# Patient Record
Sex: FEMALE | Race: OTHER | HISPANIC OR LATINO | URBAN - METROPOLITAN AREA
[De-identification: names, ages, dates, MRNs, and addresses within clinical notes are randomized per-mention and may not be internally consistent; named-entity substitution may affect disease eponyms.]

---

## 2017-10-16 ENCOUNTER — EMERGENCY (EMERGENCY)
Facility: HOSPITAL | Age: 82
LOS: 1 days | Discharge: HOME | End: 2017-10-16
Attending: EMERGENCY MEDICINE
Payer: MEDICARE

## 2017-10-16 VITALS
HEART RATE: 68 BPM | OXYGEN SATURATION: 96 % | SYSTOLIC BLOOD PRESSURE: 169 MMHG | RESPIRATION RATE: 20 BRPM | WEIGHT: 106.92 LBS | DIASTOLIC BLOOD PRESSURE: 63 MMHG | TEMPERATURE: 98 F | HEIGHT: 55 IN

## 2017-10-16 VITALS
DIASTOLIC BLOOD PRESSURE: 69 MMHG | RESPIRATION RATE: 20 BRPM | WEIGHT: 89.95 LBS | HEIGHT: 55 IN | SYSTOLIC BLOOD PRESSURE: 170 MMHG | OXYGEN SATURATION: 99 % | HEART RATE: 74 BPM | TEMPERATURE: 98 F

## 2017-10-16 VITALS
DIASTOLIC BLOOD PRESSURE: 74 MMHG | HEART RATE: 78 BPM | RESPIRATION RATE: 16 BRPM | OXYGEN SATURATION: 99 % | SYSTOLIC BLOOD PRESSURE: 152 MMHG | TEMPERATURE: 97 F

## 2017-10-16 PROCEDURE — 70450 CT HEAD/BRAIN W/O DYE: CPT

## 2017-10-16 PROCEDURE — 73110 X-RAY EXAM OF WRIST: CPT | Mod: 26,LT

## 2017-10-16 PROCEDURE — 73110 X-RAY EXAM OF WRIST: CPT

## 2017-10-16 PROCEDURE — 99283 EMERGENCY DEPT VISIT LOW MDM: CPT

## 2017-10-16 PROCEDURE — T1013: CPT

## 2017-10-16 PROCEDURE — 70450 CT HEAD/BRAIN W/O DYE: CPT | Mod: 26

## 2017-10-16 PROCEDURE — 99284 EMERGENCY DEPT VISIT MOD MDM: CPT

## 2017-10-16 PROCEDURE — 99284 EMERGENCY DEPT VISIT MOD MDM: CPT | Mod: 25

## 2017-10-16 PROCEDURE — 99283 EMERGENCY DEPT VISIT LOW MDM: CPT | Mod: 25

## 2017-10-16 NOTE — ED ADULT TRIAGE NOTE - CHIEF COMPLAINT QUOTE
pt kathleen from [facility] after staff saw bruising and swelling to forhead, pt reports hitting her head this am while almost falling, no loc, no dizziness. pt on plavix and asa. md cleared for main room

## 2017-10-16 NOTE — ED PROVIDER NOTE - OBJECTIVE STATEMENT
pt presents s/p hitting her head on cabinet on plavix no loc + left sided unlaiteral achy ha no prior tx. no loc denies fever. denies neck pain. no chest pain or sob. no abd pain. no n/v/d. no urinary f/u/d. no back pain. no motor or sensory deficits. denies illicit drug use. no recent travel. no rash. no other acute issues symptoms or concerns

## 2017-10-16 NOTE — ED STATDOCS - ATTENDING CONTRIBUTION TO CARE
I, Dr. Bell, performed the initial face to face bedside interview with this patient regarding history of present illness, review of symptoms and relevant past medical, social and family history.  I completed an independent physical examination.  I was the initial provider who evaluated this patient. I have signed out the follow up of any pending tests (i.e. labs, radiological studies) to the ACP.  I have communicated the patient’s plan of care and disposition with the ACP.

## 2017-10-16 NOTE — ED STATDOCS - SKIN, MLM
Bruise to the dorsum of the L wrist. Abrasion to the R knee. Hematoma and swelling to the medial aspect of the R knee. Swelling to the ulnar aspect of the L wrist. Ecchymosis and bruising to the L forehead and L upper face.

## 2017-10-16 NOTE — ED ADULT NURSE NOTE - OBJECTIVE STATEMENT
Pt states she was walking out of the house and missed a step and fell forward hurting her head.  Pt states she takes 81mg of ASA a day.  HR is regular, lungs clear b/l abd soft with positive bowel sounds in all four quadrants will cont to monitor.  Denies loc

## 2017-10-16 NOTE — ED PROVIDER NOTE - PHYSICAL EXAMINATION
neuro: CN II - XII intact, EOMI, PERRL, no papilledema, 5/5 muscle strength x 4 extremities, no sensory deficits, 2+ dtr globally, negative babinski, no ataxic gait, normal SINDHU and FNT, normal romberg

## 2017-10-16 NOTE — ED ADULT TRIAGE NOTE - CHIEF COMPLAINT QUOTE
"I fell this morning walking into my house from the patio. They brought me here and did a CT of my head but then sent me home. I have pain to my right knee (Pt has bruising and swelling to anterior lateral knee) and pain to my left shin and I have pain to left wrist, I took a Tylenol but it still hurts a lot. "  Pt is on baby asa

## 2017-10-16 NOTE — ED PROVIDER NOTE - MEDICAL DECISION MAKING DETAILS
ambualting in nad at [facility] per daughterin law at bedside return to ed for intractable HA, persistent vomiting, or new onset motor/sensory deficits

## 2017-10-16 NOTE — ED STATDOCS - PROGRESS NOTE DETAILS
Pt is an 85yo F complaining of L wrist pain after fall this morning. She states that she was in the ED this morning and had a head CT. She states she went home and her wrist started hurting and had decreased strength. She also states that she took Tylenol without relief. heart rrr. lungs clear to auscultation. Cap. refillo <2 seconds. neurovascularly intact. decreased L wrist strength . Will await x-ray and re-evaluate. No fracture or dislocation on x-ray. Will brace wrist and have pt f/u with PCP/ ORTHO.

## 2017-11-03 PROBLEM — Z00.00 ENCOUNTER FOR PREVENTIVE HEALTH EXAMINATION: Status: ACTIVE | Noted: 2017-11-03

## 2019-10-01 ENCOUNTER — EMERGENCY (EMERGENCY)
Facility: HOSPITAL | Age: 84
LOS: 1 days | Discharge: HOME | End: 2019-10-01
Attending: STUDENT IN AN ORGANIZED HEALTH CARE EDUCATION/TRAINING PROGRAM
Payer: MEDICARE

## 2019-10-01 VITALS
TEMPERATURE: 99 F | SYSTOLIC BLOOD PRESSURE: 146 MMHG | DIASTOLIC BLOOD PRESSURE: 56 MMHG | HEART RATE: 67 BPM | OXYGEN SATURATION: 99 % | HEIGHT: 63 IN | RESPIRATION RATE: 16 BRPM | WEIGHT: 106.04 LBS

## 2019-10-01 PROCEDURE — 73564 X-RAY EXAM KNEE 4 OR MORE: CPT

## 2019-10-01 PROCEDURE — 99283 EMERGENCY DEPT VISIT LOW MDM: CPT

## 2019-10-01 PROCEDURE — 73564 X-RAY EXAM KNEE 4 OR MORE: CPT | Mod: 26,LT

## 2019-10-01 RX ORDER — ACETAMINOPHEN 500 MG
975 TABLET ORAL ONCE
Refills: 0 | Status: COMPLETED | OUTPATIENT
Start: 2019-10-01 | End: 2019-10-01

## 2019-10-01 RX ADMIN — Medication 975 MILLIGRAM(S): at 11:54

## 2019-10-01 NOTE — ED STATDOCS - OBJECTIVE STATEMENT
Pt is an 87 y/o F presenting to the ED with c/o left knee pain for the last 3-4 days. Hx obtained by son at bedside by translation. Pt told him she accidentally struck the left knee on the toilet, did not initially have pain, but in the days following the injury, developed left knee pain with swelling. Pt now unable to bear weight or walk secondary to pain. Denies numbness/tingling/weakness. NO other injuries or trauma. Pt has not taken anything for pain.

## 2019-10-01 NOTE — ED ADULT TRIAGE NOTE - CHIEF COMPLAINT QUOTE
Pt hurt her left knee while in the bathroom last Wednesday. +swelling to knee, no bruising or obvious deformity

## 2019-10-01 NOTE — ED STATDOCS - ATTENDING CONTRIBUTION TO CARE
I performed a face to face history and physical exam of the patient and discussed their management with the resident/ACP. I reviewed the resident/ACP's note and agree with the documented findings and plan of care.    imaging reviewed and negative. Pt ambulatory. instructed to f/up with ortho and return for any other concerns.

## 2019-10-01 NOTE — ED STATDOCS - PHYSICAL EXAMINATION
Constitutional - well-developed; well nourished.   Head - NCAT. Airway patent.   Eyes - PERRL.   CV - RRR. no murmur. no edema.   Pulm - CTAB.   Abd - soft, nt. no rebound. no guarding.   Neuro - A&Ox3. strength 5/5 x4. sensation intact x4. normal gait.   Skin - No rash.   MSK - normal ROM. Constitutional - well-developed; well nourished.   Head - NCAT. Airway patent.   Neuro - A&Ox3. strength 5/5 x4. sensation intact x4. normal gait.   Skin - No rash.   MSK - significant tenderness and swelling to the left knee

## 2019-10-01 NOTE — ED STATDOCS - CARE PLAN
Principal Discharge DX:	Acute pain of left knee  Assessment and plan of treatment:	RICE tx as discussed   F/U with PCP  Return to ED if pain intensifies  Secondary Diagnosis:	Contusion of left knee, initial encounter

## 2019-10-01 NOTE — ED STATDOCS - PATIENT PORTAL LINK FT
You can access the FollowMyHealth Patient Portal offered by [facility] by registering at the following website: [email or web address]. By joining [facility]’s FollowMyHealth portal, you will also be able to view your health information using other applications (apps) compatible with our system.

## 2019-10-01 NOTE — ED STATDOCS - PROGRESS NOTE DETAILS
Pt moved form intake Room. Pt seen and evaluated by intake Physician. HPI, Physical examination performed by intake Physician . Note reviewed and followup examination performed by me consistent with initial assessment. Agrees with intake Physician plan and tests. Pt x-ray of knee negative and Pt was made aware of result. Pt D/C home with RICE treatment protocol and  will return if pain worsens.

## 2019-12-01 ENCOUNTER — OUTPATIENT (OUTPATIENT)
Dept: OUTPATIENT SERVICES | Facility: HOSPITAL | Age: 84
LOS: 1 days | End: 2019-12-01
Payer: MEDICARE

## 2019-12-01 PROCEDURE — G9001: CPT

## 2019-12-19 ENCOUNTER — INPATIENT (INPATIENT)
Facility: HOSPITAL | Age: 84
LOS: 7 days | Discharge: HOME | DRG: 638 | End: 2019-12-27
Attending: HOSPITALIST | Admitting: HOSPITALIST
Payer: MEDICARE

## 2019-12-19 VITALS
OXYGEN SATURATION: 98 % | RESPIRATION RATE: 18 BRPM | HEART RATE: 71 BPM | DIASTOLIC BLOOD PRESSURE: 44 MMHG | SYSTOLIC BLOOD PRESSURE: 105 MMHG | TEMPERATURE: 98 F

## 2019-12-19 DIAGNOSIS — Z95.0 PRESENCE OF CARDIAC PACEMAKER: ICD-10-CM

## 2019-12-19 DIAGNOSIS — L08.9 LOCAL INFECTION OF THE SKIN AND SUBCUTANEOUS TISSUE, UNSPECIFIED: ICD-10-CM

## 2019-12-19 DIAGNOSIS — E11.621 TYPE 2 DIABETES MELLITUS WITH FOOT ULCER: ICD-10-CM

## 2019-12-19 LAB
ALBUMIN SERPL ELPH-MCNC: 3.9 G/DL — SIGNIFICANT CHANGE UP (ref 3.3–5.2)
ALP SERPL-CCNC: 81 U/L — SIGNIFICANT CHANGE UP (ref 40–120)
ALT FLD-CCNC: 47 U/L — HIGH
ANION GAP SERPL CALC-SCNC: 12 MMOL/L — SIGNIFICANT CHANGE UP (ref 5–17)
APPEARANCE UR: CLEAR — SIGNIFICANT CHANGE UP
APTT BLD: 32.9 SEC — SIGNIFICANT CHANGE UP (ref 27.5–36.3)
AST SERPL-CCNC: 51 U/L — HIGH
BACTERIA # UR AUTO: ABNORMAL
BASOPHILS # BLD AUTO: 0.02 K/UL — SIGNIFICANT CHANGE UP (ref 0–0.2)
BASOPHILS NFR BLD AUTO: 0.5 % — SIGNIFICANT CHANGE UP (ref 0–2)
BILIRUB SERPL-MCNC: 0.4 MG/DL — SIGNIFICANT CHANGE UP (ref 0.4–2)
BILIRUB UR-MCNC: NEGATIVE — SIGNIFICANT CHANGE UP
BUN SERPL-MCNC: 32 MG/DL — HIGH (ref 8–20)
CALCIUM SERPL-MCNC: 9.8 MG/DL — SIGNIFICANT CHANGE UP (ref 8.6–10.2)
CHLORIDE SERPL-SCNC: 103 MMOL/L — SIGNIFICANT CHANGE UP (ref 98–107)
CO2 SERPL-SCNC: 29 MMOL/L — SIGNIFICANT CHANGE UP (ref 22–29)
COLOR SPEC: YELLOW — SIGNIFICANT CHANGE UP
CREAT SERPL-MCNC: 1.25 MG/DL — SIGNIFICANT CHANGE UP (ref 0.5–1.3)
DIFF PNL FLD: NEGATIVE — SIGNIFICANT CHANGE UP
EOSINOPHIL # BLD AUTO: 0.06 K/UL — SIGNIFICANT CHANGE UP (ref 0–0.5)
EOSINOPHIL NFR BLD AUTO: 1.4 % — SIGNIFICANT CHANGE UP (ref 0–6)
EPI CELLS # UR: SIGNIFICANT CHANGE UP
GLUCOSE BLDC GLUCOMTR-MCNC: 164 MG/DL — HIGH (ref 70–99)
GLUCOSE SERPL-MCNC: 122 MG/DL — HIGH (ref 70–115)
GLUCOSE UR QL: NEGATIVE MG/DL — SIGNIFICANT CHANGE UP
HCT VFR BLD CALC: 36.4 % — SIGNIFICANT CHANGE UP (ref 34.5–45)
HGB BLD-MCNC: 11.1 G/DL — LOW (ref 11.5–15.5)
IMM GRANULOCYTES NFR BLD AUTO: 0.2 % — SIGNIFICANT CHANGE UP (ref 0–1.5)
INR BLD: 1.06 RATIO — SIGNIFICANT CHANGE UP (ref 0.88–1.16)
KETONES UR-MCNC: NEGATIVE — SIGNIFICANT CHANGE UP
LACTATE BLDV-MCNC: 0.7 MMOL/L — SIGNIFICANT CHANGE UP (ref 0.5–2)
LEUKOCYTE ESTERASE UR-ACNC: ABNORMAL
LYMPHOCYTES # BLD AUTO: 0.5 K/UL — LOW (ref 1–3.3)
LYMPHOCYTES # BLD AUTO: 11.8 % — LOW (ref 13–44)
MCHC RBC-ENTMCNC: 30.5 GM/DL — LOW (ref 32–36)
MCHC RBC-ENTMCNC: 31.3 PG — SIGNIFICANT CHANGE UP (ref 27–34)
MCV RBC AUTO: 102.5 FL — HIGH (ref 80–100)
MONOCYTES # BLD AUTO: 0.39 K/UL — SIGNIFICANT CHANGE UP (ref 0–0.9)
MONOCYTES NFR BLD AUTO: 9.2 % — SIGNIFICANT CHANGE UP (ref 2–14)
NEUTROPHILS # BLD AUTO: 3.24 K/UL — SIGNIFICANT CHANGE UP (ref 1.8–7.4)
NEUTROPHILS NFR BLD AUTO: 76.9 % — SIGNIFICANT CHANGE UP (ref 43–77)
NITRITE UR-MCNC: NEGATIVE — SIGNIFICANT CHANGE UP
PH UR: 6 — SIGNIFICANT CHANGE UP (ref 5–8)
PLATELET # BLD AUTO: 147 K/UL — LOW (ref 150–400)
POTASSIUM SERPL-MCNC: 4.5 MMOL/L — SIGNIFICANT CHANGE UP (ref 3.5–5.3)
POTASSIUM SERPL-SCNC: 4.5 MMOL/L — SIGNIFICANT CHANGE UP (ref 3.5–5.3)
PROT SERPL-MCNC: 6.3 G/DL — LOW (ref 6.6–8.7)
PROT UR-MCNC: 15 MG/DL
PROTHROM AB SERPL-ACNC: 12.2 SEC — SIGNIFICANT CHANGE UP (ref 10–12.9)
RBC # BLD: 3.55 M/UL — LOW (ref 3.8–5.2)
RBC # FLD: 15.2 % — HIGH (ref 10.3–14.5)
RBC CASTS # UR COMP ASSIST: ABNORMAL /HPF (ref 0–4)
SODIUM SERPL-SCNC: 144 MMOL/L — SIGNIFICANT CHANGE UP (ref 135–145)
SP GR SPEC: 1.01 — SIGNIFICANT CHANGE UP (ref 1.01–1.02)
UROBILINOGEN FLD QL: NEGATIVE MG/DL — SIGNIFICANT CHANGE UP
WBC # BLD: 4.22 K/UL — SIGNIFICANT CHANGE UP (ref 3.8–10.5)
WBC # FLD AUTO: 4.22 K/UL — SIGNIFICANT CHANGE UP (ref 3.8–10.5)
WBC UR QL: SIGNIFICANT CHANGE UP

## 2019-12-19 PROCEDURE — 93923 UPR/LXTR ART STDY 3+ LVLS: CPT | Mod: 26

## 2019-12-19 PROCEDURE — 71045 X-RAY EXAM CHEST 1 VIEW: CPT | Mod: 26

## 2019-12-19 PROCEDURE — 93970 EXTREMITY STUDY: CPT | Mod: 26

## 2019-12-19 PROCEDURE — 99285 EMERGENCY DEPT VISIT HI MDM: CPT

## 2019-12-19 PROCEDURE — 99223 1ST HOSP IP/OBS HIGH 75: CPT | Mod: AI

## 2019-12-19 PROCEDURE — 73630 X-RAY EXAM OF FOOT: CPT | Mod: 26,LT

## 2019-12-19 RX ORDER — DEXTROSE 50 % IN WATER 50 %
25 SYRINGE (ML) INTRAVENOUS ONCE
Refills: 0 | Status: DISCONTINUED | OUTPATIENT
Start: 2019-12-19 | End: 2019-12-27

## 2019-12-19 RX ORDER — PIPERACILLIN AND TAZOBACTAM 4; .5 G/20ML; G/20ML
3.38 INJECTION, POWDER, LYOPHILIZED, FOR SOLUTION INTRAVENOUS ONCE
Refills: 0 | Status: COMPLETED | OUTPATIENT
Start: 2019-12-19 | End: 2019-12-19

## 2019-12-19 RX ORDER — SODIUM CHLORIDE 9 MG/ML
1000 INJECTION INTRAMUSCULAR; INTRAVENOUS; SUBCUTANEOUS ONCE
Refills: 0 | Status: COMPLETED | OUTPATIENT
Start: 2019-12-19 | End: 2019-12-19

## 2019-12-19 RX ORDER — INSULIN LISPRO 100/ML
VIAL (ML) SUBCUTANEOUS
Refills: 0 | Status: DISCONTINUED | OUTPATIENT
Start: 2019-12-19 | End: 2019-12-27

## 2019-12-19 RX ORDER — PIPERACILLIN AND TAZOBACTAM 4; .5 G/20ML; G/20ML
3.38 INJECTION, POWDER, LYOPHILIZED, FOR SOLUTION INTRAVENOUS EVERY 8 HOURS
Refills: 0 | Status: DISCONTINUED | OUTPATIENT
Start: 2019-12-19 | End: 2019-12-27

## 2019-12-19 RX ORDER — GLUCAGON INJECTION, SOLUTION 0.5 MG/.1ML
1 INJECTION, SOLUTION SUBCUTANEOUS ONCE
Refills: 0 | Status: DISCONTINUED | OUTPATIENT
Start: 2019-12-19 | End: 2019-12-27

## 2019-12-19 RX ORDER — ENOXAPARIN SODIUM 100 MG/ML
40 INJECTION SUBCUTANEOUS DAILY
Refills: 0 | Status: DISCONTINUED | OUTPATIENT
Start: 2019-12-19 | End: 2019-12-27

## 2019-12-19 RX ORDER — DEXTROSE 50 % IN WATER 50 %
15 SYRINGE (ML) INTRAVENOUS ONCE
Refills: 0 | Status: DISCONTINUED | OUTPATIENT
Start: 2019-12-19 | End: 2019-12-27

## 2019-12-19 RX ORDER — INSULIN GLARGINE 100 [IU]/ML
5 INJECTION, SOLUTION SUBCUTANEOUS AT BEDTIME
Refills: 0 | Status: DISCONTINUED | OUTPATIENT
Start: 2019-12-19 | End: 2019-12-27

## 2019-12-19 RX ORDER — VANCOMYCIN HCL 1 G
1000 VIAL (EA) INTRAVENOUS ONCE
Refills: 0 | Status: COMPLETED | OUTPATIENT
Start: 2019-12-19 | End: 2019-12-19

## 2019-12-19 RX ORDER — DEXTROSE 50 % IN WATER 50 %
12.5 SYRINGE (ML) INTRAVENOUS ONCE
Refills: 0 | Status: DISCONTINUED | OUTPATIENT
Start: 2019-12-19 | End: 2019-12-27

## 2019-12-19 RX ORDER — SODIUM CHLORIDE 9 MG/ML
1000 INJECTION, SOLUTION INTRAVENOUS
Refills: 0 | Status: DISCONTINUED | OUTPATIENT
Start: 2019-12-19 | End: 2019-12-27

## 2019-12-19 RX ADMIN — SODIUM CHLORIDE 1000 MILLILITER(S): 9 INJECTION INTRAMUSCULAR; INTRAVENOUS; SUBCUTANEOUS at 15:06

## 2019-12-19 RX ADMIN — Medication 250 MILLIGRAM(S): at 22:38

## 2019-12-19 RX ADMIN — INSULIN GLARGINE 5 UNIT(S): 100 INJECTION, SOLUTION SUBCUTANEOUS at 21:53

## 2019-12-19 RX ADMIN — PIPERACILLIN AND TAZOBACTAM 200 GRAM(S): 4; .5 INJECTION, POWDER, LYOPHILIZED, FOR SOLUTION INTRAVENOUS at 15:06

## 2019-12-19 NOTE — ED ADULT NURSE NOTE - PMH
No pertinent past medical history    Pacemaker    Type 2 diabetes mellitus with foot ulcer, unspecified whether long term insulin use

## 2019-12-19 NOTE — H&P ADULT - NSHPPHYSICALEXAM_GEN_ALL_CORE
Normocephalic   EOMI PERRL  Neck supple no JVD  S1 and S2 regular   CTA B   Abd soft + BS not tender   Amor pedal edema . No calf tenderness .  Left toe tip/nail infection, erythema, edema, tenderness   No skin rash   AAO X3 no focal neurologic deficit  Mood, affect appropriate

## 2019-12-19 NOTE — CONSULT NOTE ADULT - ASSESSMENT
A:  Left 2nd digit cellulitis    P:  Patient evaluated and chart reviewed  Recommend abx per primary team recommendations   Foot cleansed with normal saline   X-ray reviewed - results as noted above  Recommend MRI to r/o any bone infections  CHARLENE ordered - pending results   Left 2nd digit dressed with betadine DSD   Pt to remain WBing as tolerated to left foot  Podiatry will follow while patient is admitted  Discussed with attending Dr. Mae

## 2019-12-19 NOTE — ED STATDOCS - OBJECTIVE STATEMENT
87 y/o F pt with PMHx of DM presents to the ED c/o left toe pain. pain at left 2nd toe, getting worse. patient had toe nails cut and obtained an infection. no trauma. Denies fever, chills, SOB, CP, abd pain.

## 2019-12-19 NOTE — H&P ADULT - NSICDXPASTMEDICALHX_GEN_ALL_CORE_FT
PAST MEDICAL HISTORY:  No pertinent past medical history     Pacemaker     Type 2 diabetes mellitus with foot ulcer, unspecified whether long term insulin use

## 2019-12-19 NOTE — ED STATDOCS - ATTENDING CONTRIBUTION TO CARE
I, Felix Cook, performed the initial face to face bedside interview with this patient regarding history of present illness, review of symptoms and relevant past medical, social and family history.  I completed an independent physical examination.  I was the initial provider who evaluated this patient. I have signed out the follow up of any pending tests (i.e. labs, radiological studies) to the ACP.  I have communicated the patient’s plan of care and disposition with the ACP.  The history, relevant review of systems, past medical and surgical history, medical decision making, and physical examination was documented by the scribe in my presence and I attest to the accuracy of the documentation.

## 2019-12-19 NOTE — ED PROVIDER NOTE - OBJECTIVE STATEMENT
pt reports 2 months of non healing wound ; 2nd to DM & pt reports being on oral abx 1 month ago per her podiatrist;   wound on left 2nd digit distal tip missing, discoloration, area dressed per md  pt ambulatory with walker

## 2019-12-19 NOTE — CONSULT NOTE ADULT - SUBJECTIVE AND OBJECTIVE BOX
Patient is a 86y old  Female who presents with a chief complaint of left 2nd digit pain    HPI: 87 y/o F pt with PMHx of DM presents to the ED c/o left toe pain. pain at left 2nd toe, getting worse. patient had toe nails cut and obtained an infection. no trauma. Denies fever, chills, SOB, CP, abd pain    History as noted above. Pt presents with pain in her left second digit that she believes to be infected following having her nails cut. Pt denies any current F/V/N/C/SOB. Pt denies any current burning, numbness, or tingling.     PMH:No pertinent past medical history    Allergies: No Known Allergies    Medications: vancomycin  IVPB 1000 milliGRAM(s) IV Intermittent once    FH:No pertinent family history in first degree relatives    PSX: No significant past surgical history    SH: vancomycin  IVPB 1000 milliGRAM(s) IV Intermittent once      Vital Signs Last 24 Hrs  T(C): 36.4 (19 Dec 2019 11:47), Max: 36.4 (19 Dec 2019 11:47)  T(F): 97.5 (19 Dec 2019 11:47), Max: 97.5 (19 Dec 2019 11:47)  HR: 71 (19 Dec 2019 11:47) (71 - 71)  BP: 105/44 (19 Dec 2019 11:47) (105/44 - 105/44)  BP(mean): --  RR: 18 (19 Dec 2019 11:47) (18 - 18)  SpO2: 98% (19 Dec 2019 11:47) (98% - 98%)    LABS                        11.1   4.22  )-----------( 147      ( 19 Dec 2019 15:24 )             36.4               12-19    144  |  103  |  32.0<H>  ----------------------------<  122<H>  4.5   |  29.0  |  1.25    Ca    9.8      19 Dec 2019 15:24    TPro  6.3<L>  /  Alb  3.9  /  TBili  0.4  /  DBili  x   /  AST  51<H>  /  ALT  47<H>  /  AlkPhos  81  12-19      ROS  REVIEW OF SYSTEMS:    CONSTITUTIONAL: No fever, weight loss, or fatigue  EYES: No eye pain, visual disturbances, or discharge  ENMT:  No difficulty hearing, tinnitus, vertigo; No sinus or throat pain  NECK: No pain or stiffness  BREASTS: No pain, masses, or nipple discharge  RESPIRATORY: No cough, wheezing, chills or hemoptysis; No shortness of breath  CARDIOVASCULAR: No chest pain, palpitations, dizziness, or leg swelling  GASTROINTESTINAL: No abdominal or epigastric pain. No nausea, vomiting, or hematemesis; No diarrhea or constipation. No melena or hematochezia.  GENITOURINARY: No dysuria, frequency, hematuria, or incontinence  NEUROLOGICAL: No headaches, memory loss, loss of strength, numbness, or tremors  SKIN: No itching, burning, rashes, or lesions   LYMPH NODES: No enlarged glands  ENDOCRINE: No heat or cold intolerance; No hair loss  MUSCULOSKELETAL: No joint pain or swelling; No muscle, back, or extremity pain  PSYCHIATRIC: No depression, anxiety, mood swings, or difficulty sleeping  HEME/LYMPH: No easy bruising, or bleeding gums  ALLERY AND IMMUNOLOGIC: No hives or eczema      PHYSICAL EXAM  GEN: BLANCA MONTERROSO is a pleasant well-nourished, well developed 86y Female in no acute distress, alert awake, and oriented to person, place and time.   LE Focused:    Vasc: DP/PT palpable; CFT <3 sec x 10; TG WNL; minimal edema and erythema noted to left 2nd digit  Derm: No IDM, erythema with superficial abrasion noted to distal tip of left second digit; edema noted  Neuro: Protective sensation grossly intact  MSK: Hammered digits noted 2-5 on left     Imaging: < from: Xray Foot AP + Lateral + Oblique, Left (12.19.19 @ 14:20) >     EXAM:  FOOT-LEFT                          PROCEDURE DATE:  12/19/2019          INTERPRETATION:  Radiographs of the left foot         CLINICAL INFORMATION:   Foot pain. Attention second toe    TECHNIQUE:  Frontal, oblique and lateral views of the foot were obtained.    FINDINGS:   No prior similar studies are available for review.    There is diffuse osteopenia.    The forefoot demonstrates no evidence of fracture. Normal alignment is seen. There is severe degenerative change at the second metatarsophalangeal joint. Hallux valgus is seen. There is deformity of the third metatarsal tarsal head which may indicate old fracture. There is suggestion of erosion at the top of the second toe. Sesamoids are intact.    The midfoot appears intact.    The hindfoot demonstrates a small plantar calcaneal spur.    There is no soft tissue swelling.    IMPRESSION:   Erosion at the top of the second toe may indicate osteomyelitis. Further evaluation with MR imaging is recommended  Hallux valgus and degenerative change first MTP joint.                     TO BECKFORD M.D.,ATTENDING RADIOLOGIST  This document has been electronically signed. Dec 19 2019  2:36PM

## 2019-12-19 NOTE — ED STATDOCS - PROGRESS NOTE DETAILS
KELSIE Smith NOTE: Pt evaluated at bedside. Pt evaluated prior by intake physician. Otherwise HPI/PE/ROS as noted above. Will follow up plan per intake physician. ED  Deepa. KELSIE Smith NOTE: Reviewed labs, will admit to medicine for further management of toe infection. Pt agreeable to admission. Medicine team accepted admission, all further care and disposition transferred to medicine team.

## 2019-12-19 NOTE — H&P ADULT - PROBLEM SELECTOR PLAN 1
started abx vancomycin   Blood cx   CRP, ESR  CHARLENE per podiatry   Will consult ID  Podiatry dr Mae group to follow

## 2019-12-19 NOTE — H&P ADULT - HISTORY OF PRESENT ILLNESS
86 yr female with history of DM, said she had toe nailed clipped about 10 days ago.  Had incidental clip injury to tip of left 2nd toe.  Then worsening pain, swelling and difficulty ambulating or bearing weight on left foot. Send over by podiatry for abx theray.  Left foot xray showing erosion at the tip of 2nd toe that may indicate osteomyelitis.

## 2019-12-19 NOTE — ED ADULT NURSE NOTE - NSIMPLEMENTINTERV_GEN_ALL_ED
Implemented All Fall Risk Interventions:  [place] to call system. Call bell, personal items and telephone within reach. Instruct patient to call for assistance. Room bathroom lighting operational. Non-slip footwear when patient is off stretcher. Physically safe environment: no spills, clutter or unnecessary equipment. Stretcher in lowest position, wheels locked, appropriate side rails in place. Provide visual cue, wrist band, yellow gown, etc. Monitor gait and stability. Monitor for mental status changes and reorient to person, place, and time. Review medications for side effects contributing to fall risk. Reinforce activity limits and safety measures with patient and family.

## 2019-12-20 LAB
ANION GAP SERPL CALC-SCNC: 10 MMOL/L — SIGNIFICANT CHANGE UP (ref 5–17)
BUN SERPL-MCNC: 28 MG/DL — HIGH (ref 8–20)
CALCIUM SERPL-MCNC: 9.3 MG/DL — SIGNIFICANT CHANGE UP (ref 8.6–10.2)
CHLORIDE SERPL-SCNC: 108 MMOL/L — HIGH (ref 98–107)
CO2 SERPL-SCNC: 28 MMOL/L — SIGNIFICANT CHANGE UP (ref 22–29)
CREAT SERPL-MCNC: 1.17 MG/DL — SIGNIFICANT CHANGE UP (ref 0.5–1.3)
CRP SERPL-MCNC: <0.4 MG/DL — SIGNIFICANT CHANGE UP (ref 0–0.4)
CULTURE RESULTS: NO GROWTH — SIGNIFICANT CHANGE UP
ERYTHROCYTE [SEDIMENTATION RATE] IN BLOOD: 7 MM/HR — SIGNIFICANT CHANGE UP (ref 0–20)
GLUCOSE BLDC GLUCOMTR-MCNC: 102 MG/DL — HIGH (ref 70–99)
GLUCOSE BLDC GLUCOMTR-MCNC: 114 MG/DL — HIGH (ref 70–99)
GLUCOSE BLDC GLUCOMTR-MCNC: 116 MG/DL — HIGH (ref 70–99)
GLUCOSE BLDC GLUCOMTR-MCNC: 95 MG/DL — SIGNIFICANT CHANGE UP (ref 70–99)
GLUCOSE SERPL-MCNC: 96 MG/DL — SIGNIFICANT CHANGE UP (ref 70–115)
HBA1C BLD-MCNC: 6 % — HIGH (ref 4–5.6)
HCT VFR BLD CALC: 35.8 % — SIGNIFICANT CHANGE UP (ref 34.5–45)
HGB BLD-MCNC: 11 G/DL — LOW (ref 11.5–15.5)
MCHC RBC-ENTMCNC: 30.7 GM/DL — LOW (ref 32–36)
MCHC RBC-ENTMCNC: 30.9 PG — SIGNIFICANT CHANGE UP (ref 27–34)
MCV RBC AUTO: 100.6 FL — HIGH (ref 80–100)
PLATELET # BLD AUTO: 134 K/UL — LOW (ref 150–400)
POTASSIUM SERPL-MCNC: 4.2 MMOL/L — SIGNIFICANT CHANGE UP (ref 3.5–5.3)
POTASSIUM SERPL-SCNC: 4.2 MMOL/L — SIGNIFICANT CHANGE UP (ref 3.5–5.3)
RBC # BLD: 3.56 M/UL — LOW (ref 3.8–5.2)
RBC # FLD: 15.2 % — HIGH (ref 10.3–14.5)
SODIUM SERPL-SCNC: 146 MMOL/L — HIGH (ref 135–145)
SPECIMEN SOURCE: SIGNIFICANT CHANGE UP
WBC # BLD: 5.27 K/UL — SIGNIFICANT CHANGE UP (ref 3.8–10.5)
WBC # FLD AUTO: 5.27 K/UL — SIGNIFICANT CHANGE UP (ref 3.8–10.5)

## 2019-12-20 PROCEDURE — 99223 1ST HOSP IP/OBS HIGH 75: CPT

## 2019-12-20 PROCEDURE — 99233 SBSQ HOSP IP/OBS HIGH 50: CPT

## 2019-12-20 RX ORDER — INFLUENZA VIRUS VACCINE 15; 15; 15; 15 UG/.5ML; UG/.5ML; UG/.5ML; UG/.5ML
0.5 SUSPENSION INTRAMUSCULAR ONCE
Refills: 0 | Status: DISCONTINUED | OUTPATIENT
Start: 2019-12-20 | End: 2019-12-27

## 2019-12-20 RX ADMIN — PIPERACILLIN AND TAZOBACTAM 25 GRAM(S): 4; .5 INJECTION, POWDER, LYOPHILIZED, FOR SOLUTION INTRAVENOUS at 11:34

## 2019-12-20 RX ADMIN — INSULIN GLARGINE 5 UNIT(S): 100 INJECTION, SOLUTION SUBCUTANEOUS at 21:14

## 2019-12-20 RX ADMIN — PIPERACILLIN AND TAZOBACTAM 25 GRAM(S): 4; .5 INJECTION, POWDER, LYOPHILIZED, FOR SOLUTION INTRAVENOUS at 21:14

## 2019-12-20 RX ADMIN — ENOXAPARIN SODIUM 40 MILLIGRAM(S): 100 INJECTION SUBCUTANEOUS at 11:33

## 2019-12-20 NOTE — CONSULT NOTE ADULT - SUBJECTIVE AND OBJECTIVE BOX
[facility]  INFECTIOUS DISEASES AND INTERNAL MEDICINE at [place]  =======================================================  Leandro Monk MD  Diplomates American Board of Internal Medicine and Infectious Diseases  Tel: [phone]      Fax: [phone]  =======================================================      [id number]  BLANCA MONTERROSO     CC: Patient is a 86y old  Female who presents with a chief complaint of Left Second toe infection (20 Dec 2019 15:31)    87y/o  Female with h/o DM. Patient comes in with left 2nd toe discoloration. Reports she had toe nailed clipped about 1 month ago. There was incidental clip injury to tip of left 2nd toe.  Since the toe has become discolored, tender, swollen and she hs had difficulty ambulating or bearing weight on left foot. In the ER patient was afebrile, no leukocytosis. Started on Zosyn. ID input requested.     Past Medical & Surgical Hx:  Pacemaker  Type 2 diabetes mellitus with foot ulcer, unspecified whether long term insulin use  No significant past surgical history      Social Hx:  Denies smoking, ETOH      FAMILY HISTORY:  DM - mother and father       Allergies  No Known Allergies      Antibiotics:  piperacillin/tazobactam IVPB.. 3.375 Gram(s) IV Intermittent every 8 hours      REVIEW OF SYSTEMS:  CONSTITUTIONAL:  No Fever or chills  HEENT:  No diplopia or blurred vision.  No earache, sore throat or runny nose.  CARDIOVASCULAR:  No pressure, squeezing, strangling, tightness, heaviness or aching about the chest, neck, axilla or epigastrium.  RESPIRATORY:  No cough, shortness of breath  GASTROINTESTINAL:  No nausea, vomiting or diarrhea.  GENITOURINARY:  No dysuria, frequency or urgency.   MUSCULOSKELETAL:  no joint aches, no muscle pain  SKIN:  Left foot 2nd toe with dry gangrene at the tip  NEUROLOGIC:  No Headaches, seizures   PSYCHIATRIC:  No disorder of thought or mood.  ENDOCRINE:  No heat or cold intolerance  HEMATOLOGICAL:  No easy bruising or bleeding.       Physical Exam:  Vital Signs Last 24 Hrs  T(C): 36.7 (20 Dec 2019 15:44), Max: 36.7 (20 Dec 2019 07:50)  T(F): 98 (20 Dec 2019 15:44), Max: 98 (20 Dec 2019 07:50)  HR: 65 (20 Dec 2019 15:44) (65 - 92)  BP: 132/82 (20 Dec 2019 15:44) (124/62 - 140/71)  RR: 18 (20 Dec 2019 15:44) (17 - 18)  SpO2: 96% (20 Dec 2019 15:44) (96% - 98%)      GEN: NAD, pleasant  HEENT: normocephalic and atraumatic. EOMI. PERRL.  Anicteric  NECK: Supple.   LUNGS: Clear to auscultation.  HEART: Regular rate and rhythm   ABDOMEN: Soft, nontender, and nondistended.  Positive bowel sounds.    : No CVA tenderness  EXTREMITIES: Without any edema.  MSK: No joint swelling  NEUROLOGIC: No Focal Deficits  PSYCHIATRIC: Appropriate affect .  SKIN: Left second toe with dry gangrene on the tip      Labs:      146<H>  |  108<H>  |  28.0<H>  ----------------------------<  96  4.2   |  28.0  |  1.17    Ca    9.3      20 Dec 2019 08:10    TPro  6.3<L>  /  Alb  3.9  /  TBili  0.4  /  DBili  x   /  AST  51<H>  /  ALT  47<H>  /  AlkPhos  81                            11.0   5.27  )-----------( 134      ( 20 Dec 2019 08:10 )             35.8       PT/INR - ( 19 Dec 2019 15:24 )   PT: 12.2 sec;   INR: 1.06 ratio         PTT - ( 19 Dec 2019 15:24 )  PTT:32.9 sec  Urinalysis Basic - ( 19 Dec 2019 18:45 )    Color: Yellow / Appearance: Clear / S.010 / pH: x  Gluc: x / Ketone: Negative  / Bili: Negative / Urobili: Negative mg/dL   Blood: x / Protein: 15 mg/dL / Nitrite: Negative   Leuk Esterase: Small / RBC: 6-10 /HPF / WBC 0-2   Sq Epi: x / Non Sq Epi: Occasional / Bacteria: Occasional      LIVER FUNCTIONS - ( 19 Dec 2019 15:24 )  Alb: 3.9 g/dL / Pro: 6.3 g/dL / ALK PHOS: 81 U/L / ALT: 47 U/L / AST: 51 U/L / GGT: x             Sedimentation Rate, Erythrocyte: 7 mm/hr (19 @ 08:10)    C-Reactive Protein, Serum: <0.40 mg/dL (19 @ 08:10)      Hemoglobin A1C, Whole Blood (19 @ 08:10)    Hemoglobin A1C, Whole Blood: 6.0 %      RECENT CULTURES:   @ 18:46 .Urine     No growth        < from: US Duplex Venous Lower Ext Complete, Bilateral (19 @ 20:38) >  EXAM:  US DPLX LWR EXT VEINS COMPL BI                          PROCEDURE DATE:  2019      INTERPRETATION:  CLINICAL INFORMATION: Bilateral leg swelling.    COMPARISON: None available.    TECHNIQUE: Duplex sonography of the BILATERAL LOWER extremity veins with color and spectral Doppler, with and without compression.      FINDINGS:    There is normal compressibility of the bilateral common femoral, femoral and popliteal veins.     Doppler examination shows normal spontaneous and phasic flow.    No calf vein thrombosis is detected.    Right popliteal fossa cyst measuring 5.8 x 2.2 x 3.9 cm. Subcutaneous edema in the right calf.    IMPRESSION:     No evidence of deep venous thrombosis in either lower extremity.    < end of copied text >        < from: Xray Foot AP + Lateral + Oblique, Left (19 @ 14:20) >  EXAM:  FOOT-LEFT                          PROCEDURE DATE:  2019      INTERPRETATION:  Radiographs of the left foot         CLINICAL INFORMATION:   Foot pain. Attention second toe    TECHNIQUE:  Frontal, oblique and lateral views of the foot were obtained.    FINDINGS:   No prior similar studies are available for review.    There is diffuse osteopenia.    The forefoot demonstrates no evidence of fracture. Normal alignment is seen. There is severe degenerative change at the second metatarsophalangeal joint. Hallux valgus is seen. There is deformity of the third metatarsal tarsal head which may indicate old fracture. There is suggestion of erosion at the top of the second toe. Sesamoids are intact.    The midfoot appears intact.    The hindfoot demonstrates a small plantar calcaneal spur.    There is no soft tissue swelling.    IMPRESSION:   Erosion at the top of the second toe may indicate osteomyelitis. Further evaluation with MR imaging is recommended  Hallux valgus and degenerative change first MTP joint.     < end of copied text >

## 2019-12-20 NOTE — PROGRESS NOTE ADULT - ASSESSMENT
86 yr female with left 2nd toe infection.  DM , Pacemaker      Problem/Plan - 1:  ·  Problem: Toe infection.  Plan: started abx vancomycin   Blood cx   CRP 0.4 , ESR 7.  Less likely a chronic bone infection process of the form of osteomyelitis  Will consult ID Dr Calvillo   Podiatry dr Mae group following . Toe is cellulitic , cyanosed and may be devitalized.      Problem/Plan - 2:  ·  Problem: Type 2 diabetes mellitus with foot ulcer, unspecified whether long term insulin use.  Plan: Insulin sliding scale.      Problem/Plan - 3:  ·  Problem: Pacemaker.  Plan: Stable.   Will obtain Echo.

## 2019-12-20 NOTE — PROGRESS NOTE ADULT - SUBJECTIVE AND OBJECTIVE BOX
Patient is a 86y old  Female who presents with a chief complaint of left 2nd digit pain    HPI: 87 y/o F pt with PMHx of DM presents to the ED c/o left toe pain. pain at left 2nd toe, getting worse. patient had toe nails cut and obtained an infection. no trauma. Denies fever, chills, SOB, CP, abd pain    History as noted above. Pt presents with pain in her left second digit that she believes to be infected following having her nails cut. Pt denies any current F/V/N/C/SOB. Pt denies any current burning, numbness, or tingling.     PMH:No pertinent past medical history    Allergies: No Known Allergies    Medications: vancomycin  IVPB 1000 milliGRAM(s) IV Intermittent once    FH:No pertinent family history in first degree relatives    PSX: No significant past surgical history    SH: vancomycin  IVPB 1000 milliGRAM(s) IV Intermittent once      Vital Signs Last 24 Hrs  T(C): 36.4 (19 Dec 2019 11:47), Max: 36.4 (19 Dec 2019 11:47)  T(F): 97.5 (19 Dec 2019 11:47), Max: 97.5 (19 Dec 2019 11:47)  HR: 71 (19 Dec 2019 11:47) (71 - 71)  BP: 105/44 (19 Dec 2019 11:47) (105/44 - 105/44)  BP(mean): --  RR: 18 (19 Dec 2019 11:47) (18 - 18)  SpO2: 98% (19 Dec 2019 11:47) (98% - 98%)    LABS:                          11.0   5.27  )-----------( 134      ( 20 Dec 2019 08:10 )             35.8     12-20    146<H>  |  108<H>  |  28.0<H>  ----------------------------<  96  4.2   |  28.0  |  1.17    Ca    9.3      20 Dec 2019 08:10    TPro  6.3<L>  /  Alb  3.9  /  TBili  0.4  /  DBili  x   /  AST  51<H>  /  ALT  47<H>  /  AlkPhos  81  12-19          ROS  REVIEW OF SYSTEMS:    CONSTITUTIONAL: No fever, weight loss, or fatigue  EYES: No eye pain, visual disturbances, or discharge  ENMT:  No difficulty hearing, tinnitus, vertigo; No sinus or throat pain  NECK: No pain or stiffness  BREASTS: No pain, masses, or nipple discharge  RESPIRATORY: No cough, wheezing, chills or hemoptysis; No shortness of breath  CARDIOVASCULAR: No chest pain, palpitations, dizziness, or leg swelling  GASTROINTESTINAL: No abdominal or epigastric pain. No nausea, vomiting, or hematemesis; No diarrhea or constipation. No melena or hematochezia.  GENITOURINARY: No dysuria, frequency, hematuria, or incontinence  NEUROLOGICAL: No headaches, memory loss, loss of strength, numbness, or tremors  SKIN: No itching, burning, rashes, or lesions   LYMPH NODES: No enlarged glands  ENDOCRINE: No heat or cold intolerance; No hair loss  MUSCULOSKELETAL: No joint pain or swelling; No muscle, back, or extremity pain  PSYCHIATRIC: No depression, anxiety, mood swings, or difficulty sleeping  HEME/LYMPH: No easy bruising, or bleeding gums  ALLERY AND IMMUNOLOGIC: No hives or eczema      PHYSICAL EXAM  GEN: BLANCA MONTERROSO is a pleasant well-nourished, well developed 86y Female in no acute distress, alert awake, and oriented to person, place and time.   LE Focused:    Vasc: DP/PT palpable; CFT <3 sec x 10; TG WNL; minimal edema and erythema noted to left 2nd digit  Derm: No IDM, erythema with superficial abrasion noted to distal tip of left second digit; edema noted  Neuro: Protective sensation grossly intact  MSK: Hammered digits noted 2-5 on left     Imaging: < from: Xray Foot AP + Lateral + Oblique, Left (12.19.19 @ 14:20) >     EXAM:  FOOT-LEFT                          PROCEDURE DATE:  12/19/2019          INTERPRETATION:  Radiographs of the left foot         CLINICAL INFORMATION:   Foot pain. Attention second toe    TECHNIQUE:  Frontal, oblique and lateral views of the foot were obtained.    FINDINGS:   No prior similar studies are available for review.    There is diffuse osteopenia.    The forefoot demonstrates no evidence of fracture. Normal alignment is seen. There is severe degenerative change at the second metatarsophalangeal joint. Hallux valgus is seen. There is deformity of the third metatarsal tarsal head which may indicate old fracture. There is suggestion of erosion at the top of the second toe. Sesamoids are intact.    The midfoot appears intact.    The hindfoot demonstrates a small plantar calcaneal spur.    There is no soft tissue swelling.    IMPRESSION:   Erosion at the top of the second toe may indicate osteomyelitis. Further evaluation with MR imaging is recommended  Hallux valgus and degenerative change first MTP joint.                     TO BECKFORD M.D.,ATTENDING RADIOLOGIST  This document has been electronically signed. Dec 19 2019  2:36PM

## 2019-12-20 NOTE — PROGRESS NOTE ADULT - SUBJECTIVE AND OBJECTIVE BOX
CC:  Left 2nd toe infection and cellulitis .   HPI:  86 yr female with history of DM, said she had toe nailed clipped about 10 days ago.  Had incidental clip injury to tip of left 2nd toe.  Then worsening pain, swelling and difficulty ambulating or bearing weight on left foot. Send over by podiatry for abx theray.  Left foot xray showing erosion at the tip of 2nd toe that may indicate osteomyelitis. (19 Dec 2019 18:38)    REVIEW OF SYSTEMS:    Patient denied fever, chills, abdominal pain, nausea, vomiting, cough, shortness of breath, chest pain or palpitations    Vital Signs Last 24 Hrs  T(C): 36.7 (20 Dec 2019 07:50), Max: 36.7 (20 Dec 2019 07:50)  T(F): 98 (20 Dec 2019 07:50), Max: 98 (20 Dec 2019 07:50)  HR: 92 (20 Dec 2019 07:50) (73 - 92)  BP: 134/61 (20 Dec 2019 07:50) (124/62 - 140/71)  BP(mean): --  RR: 18 (20 Dec 2019 07:50) (17 - 18)  SpO2: 98% (20 Dec 2019 07:50) (96% - 98%)I&O's Summary    PHYSICAL EXAM:  GENERAL: NAD, well-groomed  HEENT: PERRL, +EOMI, anicteric, no Minto  NECK: Supple, No JVD   CHEST/LUNG: CTA bilaterally; Normal effort  HEART: S1S2 Normal intensity, no murmurs, gallops or rubs noted  ABDOMEN: Soft, BS Normoactive, NT, ND, no HSM noted  EXTREMITIES:  Left 2nd toe cellulitis and  cyanosis. Amor pedal edema noted,   SKIN: No rashes or lesions noted  NEURO: A&Ox3, no focal deficits noted, CN II-XII intact  PSYCH: normal mood and affect; insight/judgement appropriate  LABS:                        11.0   5.27  )-----------( 134      ( 20 Dec 2019 08:10 )             35.8     12-    146<H>  |  108<H>  |  28.0<H>  ----------------------------<  96  4.2   |  28.0  |  1.17    Ca    9.3      20 Dec 2019 08:10    TPro  6.3<L>  /  Alb  3.9  /  TBili  0.4  /  DBili  x   /  AST  51<H>  /  ALT  47<H>  /  AlkPhos  81  12-19    PT/INR - ( 19 Dec 2019 15:24 )   PT: 12.2 sec;   INR: 1.06 ratio         PTT - ( 19 Dec 2019 15:24 )  PTT:32.9 sec  Urinalysis Basic - ( 19 Dec 2019 18:45 )    Color: Yellow / Appearance: Clear / S.010 / pH: x  Gluc: x / Ketone: Negative  / Bili: Negative / Urobili: Negative mg/dL   Blood: x / Protein: 15 mg/dL / Nitrite: Negative   Leuk Esterase: Small / RBC: 6-10 /HPF / WBC 0-2   Sq Epi: x / Non Sq Epi: Occasional / Bacteria: Occasional      RADIOLOGY & ADDITIONAL TESTS:    MEDICATIONS:  MEDICATIONS  (STANDING):  dextrose 5%. 1000 milliLiter(s) (50 mL/Hr) IV Continuous <Continuous>  dextrose 50% Injectable 12.5 Gram(s) IV Push once  dextrose 50% Injectable 25 Gram(s) IV Push once  dextrose 50% Injectable 25 Gram(s) IV Push once  enoxaparin Injectable 40 milliGRAM(s) SubCutaneous daily  insulin glargine Injectable (LANTUS) 5 Unit(s) SubCutaneous at bedtime  insulin lispro (HumaLOG) corrective regimen sliding scale   SubCutaneous three times a day before meals  piperacillin/tazobactam IVPB.. 3.375 Gram(s) IV Intermittent every 8 hours    MEDICATIONS  (PRN):  dextrose 40% Gel 15 Gram(s) Oral once PRN Blood Glucose LESS THAN 70 milliGRAM(s)/deciliter  glucagon  Injectable 1 milliGRAM(s) IntraMuscular once PRN Glucose LESS THAN 70 milligrams/deciliter

## 2019-12-20 NOTE — CONSULT NOTE ADULT - ASSESSMENT
87y/o  Female with h/o DM. Patient comes in with left 2nd toe discoloration. Reports she had toe nailed clipped about 1 month ago. There was incidental clip injury to tip of left 2nd toe.  Since the toe has become discolored, tender, swollen and she hs had difficulty ambulating or bearing weight on left foot. In the ER patient was afebrile, no leukocytosis. Started on Zosyn.      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 OM of 2nd toe  - Podiatry eval noted  - Need to find out PPM compatibility with MRI prior to MRI  - ESR 7  - CRP <0.04  - Continue Zosyn  - Trend Fever  - Trend Leukocytosis      Will Follow

## 2019-12-21 LAB
GLUCOSE BLDC GLUCOMTR-MCNC: 101 MG/DL — HIGH (ref 70–99)
GLUCOSE BLDC GLUCOMTR-MCNC: 108 MG/DL — HIGH (ref 70–99)
GLUCOSE BLDC GLUCOMTR-MCNC: 111 MG/DL — HIGH (ref 70–99)
GLUCOSE BLDC GLUCOMTR-MCNC: 77 MG/DL — SIGNIFICANT CHANGE UP (ref 70–99)

## 2019-12-21 PROCEDURE — 99232 SBSQ HOSP IP/OBS MODERATE 35: CPT

## 2019-12-21 RX ORDER — SACCHAROMYCES BOULARDII 250 MG
250 POWDER IN PACKET (EA) ORAL
Refills: 0 | Status: DISCONTINUED | OUTPATIENT
Start: 2019-12-21 | End: 2019-12-27

## 2019-12-21 RX ADMIN — PIPERACILLIN AND TAZOBACTAM 25 GRAM(S): 4; .5 INJECTION, POWDER, LYOPHILIZED, FOR SOLUTION INTRAVENOUS at 05:03

## 2019-12-21 RX ADMIN — ENOXAPARIN SODIUM 40 MILLIGRAM(S): 100 INJECTION SUBCUTANEOUS at 12:34

## 2019-12-21 RX ADMIN — Medication 250 MILLIGRAM(S): at 17:15

## 2019-12-21 RX ADMIN — INSULIN GLARGINE 5 UNIT(S): 100 INJECTION, SOLUTION SUBCUTANEOUS at 22:28

## 2019-12-21 RX ADMIN — PIPERACILLIN AND TAZOBACTAM 25 GRAM(S): 4; .5 INJECTION, POWDER, LYOPHILIZED, FOR SOLUTION INTRAVENOUS at 13:57

## 2019-12-21 RX ADMIN — PIPERACILLIN AND TAZOBACTAM 25 GRAM(S): 4; .5 INJECTION, POWDER, LYOPHILIZED, FOR SOLUTION INTRAVENOUS at 22:18

## 2019-12-21 NOTE — PROGRESS NOTE ADULT - SUBJECTIVE AND OBJECTIVE BOX
BLANCA MONTERROSO  ----------------------------------------  The patient was seen and evaluated for toe infection. Offers no complaints at the time of interview. Staff  assisted.    Vital Signs Last 24 Hrs  T(C): 36.6 (21 Dec 2019 07:35), Max: 36.7 (20 Dec 2019 15:44)  T(F): 97.9 (21 Dec 2019 07:35), Max: 98 (20 Dec 2019 15:44)  HR: 87 (21 Dec 2019 07:35) (65 - 87)  BP: 138/70 (21 Dec 2019 07:35) (128/76 - 138/70)  BP(mean): --  RR: 20 (21 Dec 2019 07:35) (18 - 20)  SpO2: 96% (20 Dec 2019 21:19) (96% - 96%)    CAPILLARY BLOOD GLUCOSE  POCT Blood Glucose.: 77 mg/dL (21 Dec 2019 07:58)  POCT Blood Glucose.: 116 mg/dL (20 Dec 2019 21:10)  POCT Blood Glucose.: 95 mg/dL (20 Dec 2019 16:54)  POCT Blood Glucose.: 114 mg/dL (20 Dec 2019 11:32)    PHYSICAL EXAMINATION:  ----------------------------------------  General appearance: No acute distress, Awake, Alert  HEENT: Normocephalic, Atraumatic, Conjunctiva clear, EOMI  Neck: Supple, No JVD, No tenderness  Lungs: Breath sound equal bilaterally, No wheezes, No rales  Cardiovascular: S1S2, Regular rhythm  Abdomen: Soft, Nontender, Nondistended, No guarding/rebound, Positive bowel sounds  Extremities: No clubbing, No cyanosis, No edema, No calf tenderness, Left foot dressing clean and intact  Neuro: Strength equal bilaterally, No tremors  Psychiatric: Appropriate mood, Normal affect    LABORATORY STUDIES:  ----------------------------------------             11.0   5.27  )-----------( 134      ( 20 Dec 2019 08:10 )             35.8     12-20    146<H>  |  108<H>  |  28.0<H>  ----------------------------<  96  4.2   |  28.0  |  1.17    Ca    9.3      20 Dec 2019 08:10    TPro  6.3<L>  /  Alb  3.9  /  TBili  0.4  /  DBili  x   /  AST  51<H>  /  ALT  47<H>  /  AlkPhos  81  12-19    LIVER FUNCTIONS - ( 19 Dec 2019 15:24 )  Alb: 3.9 g/dL / Pro: 6.3 g/dL / ALK PHOS: 81 U/L / ALT: 47 U/L / AST: 51 U/L / GGT: x           PT/INR - ( 19 Dec 2019 15:24 )   PT: 12.2 sec;   INR: 1.06 ratio    PTT - ( 19 Dec 2019 15:24 )  PTT:32.9 sec    Urinalysis Basic - ( 19 Dec 2019 18:45 )  Color: Yellow / Appearance: Clear / S.010 / pH: x  Gluc: x / Ketone: Negative  / Bili: Negative / Urobili: Negative mg/dL   Blood: x / Protein: 15 mg/dL / Nitrite: Negative   Leuk Esterase: Small / RBC: 6-10 /HPF / WBC 0-2   Sq Epi: x / Non Sq Epi: Occasional / Bacteria: Occasional    Culture - Urine (collected 19 Dec 2019 18:46)  Source: .Urine  Final Report (20 Dec 2019 12:50):    No growth    MEDICATIONS  (STANDING):  dextrose 5%. 1000 milliLiter(s) (50 mL/Hr) IV Continuous <Continuous>  dextrose 50% Injectable 12.5 Gram(s) IV Push once  dextrose 50% Injectable 25 Gram(s) IV Push once  dextrose 50% Injectable 25 Gram(s) IV Push once  enoxaparin Injectable 40 milliGRAM(s) SubCutaneous daily  influenza   Vaccine 0.5 milliLiter(s) IntraMuscular once  insulin glargine Injectable (LANTUS) 5 Unit(s) SubCutaneous at bedtime  insulin lispro (HumaLOG) corrective regimen sliding scale   SubCutaneous three times a day before meals  piperacillin/tazobactam IVPB.. 3.375 Gram(s) IV Intermittent every 8 hours    MEDICATIONS  (PRN):  dextrose 40% Gel 15 Gram(s) Oral once PRN Blood Glucose LESS THAN 70 milliGRAM(s)/deciliter  glucagon  Injectable 1 milliGRAM(s) IntraMuscular once PRN Glucose LESS THAN 70 milligrams/deciliter      ASSESSMENT / PLAN:  ----------------------------------------   86F with a history of diabetes and pacemaker who referred to the hospital by Podiatry for left toe infection.    Toe infection - Xray noted erosion at the top of the second toe which was concerning for possible osteomyelitis. Podiatry and Infectious Disease consultations noted. Planned for MRI to further investigate. The patient is unable to provide information in regard to whether the pacemaker is MRI compatible and is unable to identify her cardiologist. Her son is to provide the information when he arrives.    Diabetes - Insulin coverage, close monitoring of blood glucose levels. BLANCA MONTERROSO  ----------------------------------------  The patient was seen and evaluated for toe infection. Offers no complaints at the time of interview. Staff  assisted.    Vital Signs Last 24 Hrs  T(C): 36.6 (21 Dec 2019 07:35), Max: 36.7 (20 Dec 2019 15:44)  T(F): 97.9 (21 Dec 2019 07:35), Max: 98 (20 Dec 2019 15:44)  HR: 87 (21 Dec 2019 07:35) (65 - 87)  BP: 138/70 (21 Dec 2019 07:35) (128/76 - 138/70)  BP(mean): --  RR: 20 (21 Dec 2019 07:35) (18 - 20)  SpO2: 96% (20 Dec 2019 21:19) (96% - 96%)    CAPILLARY BLOOD GLUCOSE  POCT Blood Glucose.: 77 mg/dL (21 Dec 2019 07:58)  POCT Blood Glucose.: 116 mg/dL (20 Dec 2019 21:10)  POCT Blood Glucose.: 95 mg/dL (20 Dec 2019 16:54)  POCT Blood Glucose.: 114 mg/dL (20 Dec 2019 11:32)    PHYSICAL EXAMINATION:  ----------------------------------------  General appearance: No acute distress, Awake, Alert  HEENT: Normocephalic, Atraumatic, Conjunctiva clear, EOMI  Neck: Supple, No JVD, No tenderness  Lungs: Breath sound equal bilaterally, No wheezes, No rales  Cardiovascular: S1S2, Regular rhythm  Abdomen: Soft, Nontender, Nondistended, No guarding/rebound, Positive bowel sounds  Extremities: No clubbing, No cyanosis, No edema, No calf tenderness, Left foot dressing clean and intact  Neuro: Strength equal bilaterally, No tremors  Psychiatric: Appropriate mood, Normal affect    LABORATORY STUDIES:  ----------------------------------------             11.0   5.27  )-----------( 134      ( 20 Dec 2019 08:10 )             35.8     12-20    146<H>  |  108<H>  |  28.0<H>  ----------------------------<  96  4.2   |  28.0  |  1.17    Ca    9.3      20 Dec 2019 08:10    TPro  6.3<L>  /  Alb  3.9  /  TBili  0.4  /  DBili  x   /  AST  51<H>  /  ALT  47<H>  /  AlkPhos  81  12-19    LIVER FUNCTIONS - ( 19 Dec 2019 15:24 )  Alb: 3.9 g/dL / Pro: 6.3 g/dL / ALK PHOS: 81 U/L / ALT: 47 U/L / AST: 51 U/L / GGT: x           PT/INR - ( 19 Dec 2019 15:24 )   PT: 12.2 sec;   INR: 1.06 ratio    PTT - ( 19 Dec 2019 15:24 )  PTT:32.9 sec    Urinalysis Basic - ( 19 Dec 2019 18:45 )  Color: Yellow / Appearance: Clear / S.010 / pH: x  Gluc: x / Ketone: Negative  / Bili: Negative / Urobili: Negative mg/dL   Blood: x / Protein: 15 mg/dL / Nitrite: Negative   Leuk Esterase: Small / RBC: 6-10 /HPF / WBC 0-2   Sq Epi: x / Non Sq Epi: Occasional / Bacteria: Occasional    Culture - Urine (collected 19 Dec 2019 18:46)  Source: .Urine  Final Report (20 Dec 2019 12:50):    No growth    MEDICATIONS  (STANDING):  dextrose 5%. 1000 milliLiter(s) (50 mL/Hr) IV Continuous <Continuous>  dextrose 50% Injectable 12.5 Gram(s) IV Push once  dextrose 50% Injectable 25 Gram(s) IV Push once  dextrose 50% Injectable 25 Gram(s) IV Push once  enoxaparin Injectable 40 milliGRAM(s) SubCutaneous daily  influenza   Vaccine 0.5 milliLiter(s) IntraMuscular once  insulin glargine Injectable (LANTUS) 5 Unit(s) SubCutaneous at bedtime  insulin lispro (HumaLOG) corrective regimen sliding scale   SubCutaneous three times a day before meals  piperacillin/tazobactam IVPB.. 3.375 Gram(s) IV Intermittent every 8 hours    MEDICATIONS  (PRN):  dextrose 40% Gel 15 Gram(s) Oral once PRN Blood Glucose LESS THAN 70 milliGRAM(s)/deciliter  glucagon  Injectable 1 milliGRAM(s) IntraMuscular once PRN Glucose LESS THAN 70 milligrams/deciliter      ASSESSMENT / PLAN:  ----------------------------------------   86F with a history of diabetes and pacemaker who referred to the hospital by Podiatry for left toe infection.    Toe infection - Xray noted erosion at the top of the second toe which was concerning for possible osteomyelitis. Podiatry and Infectious Disease consultations noted. Planned for MRI to further investigate. On piperacillin/tazobactam. The patient is unable to provide information in regard to whether the pacemaker is MRI compatible and is unable to identify her cardiologist. Her son is to provide the information when he arrives.    Diabetes - Insulin coverage, close monitoring of blood glucose levels.    Hypernatremia / Azotemia - Mild, encouraged oral intake.

## 2019-12-21 NOTE — PROGRESS NOTE ADULT - SUBJECTIVE AND OBJECTIVE BOX
Patient has a dual chamber [employer] [place] MRI compatible PPM implanted on 6/3/19. There is no cardiac contraindication to planned MRI.

## 2019-12-22 LAB
ANION GAP SERPL CALC-SCNC: 12 MMOL/L — SIGNIFICANT CHANGE UP (ref 5–17)
BUN SERPL-MCNC: 28 MG/DL — HIGH (ref 8–20)
CALCIUM SERPL-MCNC: 9.3 MG/DL — SIGNIFICANT CHANGE UP (ref 8.6–10.2)
CHLORIDE SERPL-SCNC: 105 MMOL/L — SIGNIFICANT CHANGE UP (ref 98–107)
CO2 SERPL-SCNC: 26 MMOL/L — SIGNIFICANT CHANGE UP (ref 22–29)
CREAT SERPL-MCNC: 1.19 MG/DL — SIGNIFICANT CHANGE UP (ref 0.5–1.3)
GLUCOSE BLDC GLUCOMTR-MCNC: 102 MG/DL — HIGH (ref 70–99)
GLUCOSE BLDC GLUCOMTR-MCNC: 111 MG/DL — HIGH (ref 70–99)
GLUCOSE BLDC GLUCOMTR-MCNC: 87 MG/DL — SIGNIFICANT CHANGE UP (ref 70–99)
GLUCOSE BLDC GLUCOMTR-MCNC: 96 MG/DL — SIGNIFICANT CHANGE UP (ref 70–99)
GLUCOSE SERPL-MCNC: 115 MG/DL — SIGNIFICANT CHANGE UP (ref 70–115)
POTASSIUM SERPL-MCNC: 4.3 MMOL/L — SIGNIFICANT CHANGE UP (ref 3.5–5.3)
POTASSIUM SERPL-SCNC: 4.3 MMOL/L — SIGNIFICANT CHANGE UP (ref 3.5–5.3)
SODIUM SERPL-SCNC: 143 MMOL/L — SIGNIFICANT CHANGE UP (ref 135–145)

## 2019-12-22 PROCEDURE — 99232 SBSQ HOSP IP/OBS MODERATE 35: CPT

## 2019-12-22 RX ADMIN — INSULIN GLARGINE 5 UNIT(S): 100 INJECTION, SOLUTION SUBCUTANEOUS at 21:20

## 2019-12-22 RX ADMIN — PIPERACILLIN AND TAZOBACTAM 25 GRAM(S): 4; .5 INJECTION, POWDER, LYOPHILIZED, FOR SOLUTION INTRAVENOUS at 05:22

## 2019-12-22 RX ADMIN — Medication 250 MILLIGRAM(S): at 18:00

## 2019-12-22 RX ADMIN — Medication 250 MILLIGRAM(S): at 05:24

## 2019-12-22 RX ADMIN — PIPERACILLIN AND TAZOBACTAM 25 GRAM(S): 4; .5 INJECTION, POWDER, LYOPHILIZED, FOR SOLUTION INTRAVENOUS at 21:20

## 2019-12-22 RX ADMIN — PIPERACILLIN AND TAZOBACTAM 25 GRAM(S): 4; .5 INJECTION, POWDER, LYOPHILIZED, FOR SOLUTION INTRAVENOUS at 14:19

## 2019-12-22 RX ADMIN — ENOXAPARIN SODIUM 40 MILLIGRAM(S): 100 INJECTION SUBCUTANEOUS at 12:49

## 2019-12-22 NOTE — PROGRESS NOTE ADULT - SUBJECTIVE AND OBJECTIVE BOX
[facility]  INFECTIOUS DISEASES AND INTERNAL MEDICINE at [place]  =======================================================  Leandro Monk MD  Diplomates American Board of Internal Medicine and Infectious Diseases  Tel: [phone]      Fax: [phone]  =======================================================    BLANCA MONTERROSO [id number]    Seen with     Follow up: Foot infection    Reported STINSON this morning    No chest pain    No diarrhea      Allergies:  No Known Allergies      Antibiotics  piperacillin/tazobactam IVPB.. 3.375 Gram(s) IV Intermittent every 8 hours      REVIEW OF SYSTEMS:  CONSTITUTIONAL:  No Fever or chills  HEENT:  No diplopia or blurred vision.  No earache, sore throat or runny nose.  CARDIOVASCULAR:  No pressure, squeezing, strangling, tightness, heaviness or aching about the chest, neck, axilla or epigastrium.  RESPIRATORY:  No cough, shortness of breath. + STINSON  GASTROINTESTINAL:  No nausea, vomiting or diarrhea.  GENITOURINARY:  No dysuria, frequency or urgency.   MUSCULOSKELETAL:  no joint aches, no muscle pain  SKIN:  Left foot 2nd toe with dry gangrene at the tip  NEUROLOGIC:  No Headaches, seizures   PSYCHIATRIC:  No disorder of thought or mood.  ENDOCRINE:  No heat or cold intolerance  HEMATOLOGICAL:  No easy bruising or bleeding.       Physical Exam:  Vital Signs Last 24 Hrs  T(C): 36.5 (22 Dec 2019 00:00), Max: 36.5 (21 Dec 2019 15:36)  T(F): 97.7 (22 Dec 2019 00:00), Max: 97.7 (21 Dec 2019 15:36)  HR: 73 (22 Dec 2019 05:20) (70 - 79)  BP: 123/68 (22 Dec 2019 05:20) (123/68 - 128/75)  RR: 18 (22 Dec 2019 00:00) (18 - 18)  SpO2: 95% (22 Dec 2019 00:00) (95% - 95%)      GEN: NAD, pleasant  HEENT: normocephalic and atraumatic. EOMI. PERRL.  Anicteric  NECK: Supple.   LUNGS: Clear to auscultation.  HEART: Regular rate and rhythm   ABDOMEN: Soft, nontender, and nondistended.  Positive bowel sounds.    : No CVA tenderness  EXTREMITIES: Without any edema.  MSK: No joint swelling  NEUROLOGIC: No Focal Deficits  PSYCHIATRIC: Appropriate affect .  SKIN: Left second toe with dry gangrene on the tip      Labs:  12-22    143  |  105  |  28.0<H>  ----------------------------<  115  4.3   |  26.0  |  1.19    Ca    9.3      22 Dec 2019 07:03      RECENT CULTURES:  12-19 @ 18:46 .Urine     No growth      12-19 @ 16:27 .Blood     No growth at 48 hours      12-19 @ 15:25 .Blood     No growth at 48 hours

## 2019-12-22 NOTE — PROGRESS NOTE ADULT - SUBJECTIVE AND OBJECTIVE BOX
BLANCA MONTERROSO  ----------------------------------------  The patient was seen and evaluated for toe infection. Offers no complaints.    Vital Signs Last 24 Hrs  T(C): 36.5 (22 Dec 2019 00:00), Max: 36.5 (21 Dec 2019 15:36)  T(F): 97.7 (22 Dec 2019 00:00), Max: 97.7 (21 Dec 2019 15:36)  HR: 73 (22 Dec 2019 05:20) (70 - 79)  BP: 123/68 (22 Dec 2019 05:20) (123/68 - 128/75)  BP(mean): --  RR: 18 (22 Dec 2019 00:00) (18 - 18)  SpO2: 95% (22 Dec 2019 00:00) (95% - 95%)    CAPILLARY BLOOD GLUCOSE  POCT Blood Glucose.: 87 mg/dL (22 Dec 2019 12:45)  POCT Blood Glucose.: 111 mg/dL (22 Dec 2019 08:25)  POCT Blood Glucose.: 101 mg/dL (21 Dec 2019 22:27)  POCT Blood Glucose.: 108 mg/dL (21 Dec 2019 17:08)    PHYSICAL EXAMINATION:  ----------------------------------------  General appearance: No acute distress, Awake, Alert  HEENT: Normocephalic, Atraumatic, Conjunctiva clear, EOMI  Neck: Supple, No JVD, No tenderness  Lungs: Breath sound equal bilaterally, No wheezes, No rales  Cardiovascular: S1S2, Regular rhythm  Abdomen: Soft, Nontender, Nondistended, No guarding/rebound, Positive bowel sounds  Extremities: No clubbing, No cyanosis, No edema, No calf tenderness, Left foot dressing clean and intact, Small area of gangrene vs eschar on the tip of the toe  Neuro: Strength equal bilaterally, No tremors  Psychiatric: Appropriate mood, Normal affect    LABORATORY STUDIES:  ----------------------------------------  12-22    143  |  105  |  28.0<H>  ----------------------------<  115  4.3   |  26.0  |  1.19    Ca    9.3      22 Dec 2019 07:03    Culture - Urine (collected 19 Dec 2019 18:46)  Source: .Urine  Final Report (20 Dec 2019 12:50):    No growth    Culture - Blood (collected 19 Dec 2019 16:27)  Source: .Blood  Preliminary Report (21 Dec 2019 17:00):    No growth at 48 hours    Culture - Blood (collected 19 Dec 2019 15:25)  Source: .Blood  Preliminary Report (21 Dec 2019 17:00):    No growth at 48 hours    MEDICATIONS  (STANDING):  dextrose 5%. 1000 milliLiter(s) (50 mL/Hr) IV Continuous <Continuous>  dextrose 50% Injectable 12.5 Gram(s) IV Push once  dextrose 50% Injectable 25 Gram(s) IV Push once  dextrose 50% Injectable 25 Gram(s) IV Push once  enoxaparin Injectable 40 milliGRAM(s) SubCutaneous daily  influenza   Vaccine 0.5 milliLiter(s) IntraMuscular once  insulin glargine Injectable (LANTUS) 5 Unit(s) SubCutaneous at bedtime  insulin lispro (HumaLOG) corrective regimen sliding scale   SubCutaneous three times a day before meals  piperacillin/tazobactam IVPB.. 3.375 Gram(s) IV Intermittent every 8 hours  saccharomyces boulardii 250 milliGRAM(s) Oral two times a day    MEDICATIONS  (PRN):  dextrose 40% Gel 15 Gram(s) Oral once PRN Blood Glucose LESS THAN 70 milliGRAM(s)/deciliter  glucagon  Injectable 1 milliGRAM(s) IntraMuscular once PRN Glucose LESS THAN 70 milligrams/deciliter      ASSESSMENT / PLAN:  ----------------------------------------  86F with a history of diabetes and pacemaker who referred to the hospital by Podiatry for left toe infection.    Toe infection - Xray noted erosion at the top of the second toe which was concerning for possible osteomyelitis. Planned for MRI to further investigate. Pacemaker noted to be MRI compatible as per Cardiology. On piperacillin/tazobactam.    Diabetes - Insulin coverage, close monitoring of blood glucose levels.    Hypernatremia / Azotemia - Mild, encouraged oral intake.

## 2019-12-22 NOTE — PROGRESS NOTE ADULT - ASSESSMENT
85y/o  Female with h/o DM. Patient comes in with left 2nd toe discoloration. Reports she had toe nailed clipped about 1 month ago. There was incidental clip injury to tip of left 2nd toe.  Since the toe has become discolored, tender, swollen and she hs had difficulty ambulating or bearing weight on left foot. In the ER patient was afebrile, no leukocytosis. Started on Zosyn.      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 OM of 2nd toe  - Podiatry eval noted  - Cardiology note appreciated - PPM is compatible with MRI   - ESR 7  - CRP <0.04  - Continue Zosyn  - Trend Fever  - Trend Leukocytosis  - STINSON to be evaluated by medicine       Will Follow

## 2019-12-23 DIAGNOSIS — Z71.89 OTHER SPECIFIED COUNSELING: ICD-10-CM

## 2019-12-23 LAB
ANION GAP SERPL CALC-SCNC: 13 MMOL/L — SIGNIFICANT CHANGE UP (ref 5–17)
BUN SERPL-MCNC: 19 MG/DL — SIGNIFICANT CHANGE UP (ref 8–20)
CALCIUM SERPL-MCNC: 8.6 MG/DL — SIGNIFICANT CHANGE UP (ref 8.6–10.2)
CHLORIDE SERPL-SCNC: 107 MMOL/L — SIGNIFICANT CHANGE UP (ref 98–107)
CO2 SERPL-SCNC: 23 MMOL/L — SIGNIFICANT CHANGE UP (ref 22–29)
CREAT SERPL-MCNC: 1.04 MG/DL — SIGNIFICANT CHANGE UP (ref 0.5–1.3)
GLUCOSE BLDC GLUCOMTR-MCNC: 116 MG/DL — HIGH (ref 70–99)
GLUCOSE BLDC GLUCOMTR-MCNC: 212 MG/DL — HIGH (ref 70–99)
GLUCOSE BLDC GLUCOMTR-MCNC: 85 MG/DL — SIGNIFICANT CHANGE UP (ref 70–99)
GLUCOSE BLDC GLUCOMTR-MCNC: 98 MG/DL — SIGNIFICANT CHANGE UP (ref 70–99)
GLUCOSE SERPL-MCNC: 96 MG/DL — SIGNIFICANT CHANGE UP (ref 70–115)
HCT VFR BLD CALC: 36.8 % — SIGNIFICANT CHANGE UP (ref 34.5–45)
HGB BLD-MCNC: 11.6 G/DL — SIGNIFICANT CHANGE UP (ref 11.5–15.5)
MCHC RBC-ENTMCNC: 31.5 GM/DL — LOW (ref 32–36)
MCHC RBC-ENTMCNC: 31.6 PG — SIGNIFICANT CHANGE UP (ref 27–34)
MCV RBC AUTO: 100.3 FL — HIGH (ref 80–100)
PLATELET # BLD AUTO: 134 K/UL — LOW (ref 150–400)
POTASSIUM SERPL-MCNC: 4.3 MMOL/L — SIGNIFICANT CHANGE UP (ref 3.5–5.3)
POTASSIUM SERPL-SCNC: 4.3 MMOL/L — SIGNIFICANT CHANGE UP (ref 3.5–5.3)
RBC # BLD: 3.67 M/UL — LOW (ref 3.8–5.2)
RBC # FLD: 15.7 % — HIGH (ref 10.3–14.5)
SODIUM SERPL-SCNC: 143 MMOL/L — SIGNIFICANT CHANGE UP (ref 135–145)
WBC # BLD: 5.84 K/UL — SIGNIFICANT CHANGE UP (ref 3.8–10.5)
WBC # FLD AUTO: 5.84 K/UL — SIGNIFICANT CHANGE UP (ref 3.8–10.5)

## 2019-12-23 PROCEDURE — 99232 SBSQ HOSP IP/OBS MODERATE 35: CPT

## 2019-12-23 RX ORDER — CARVEDILOL PHOSPHATE 80 MG/1
3.12 CAPSULE, EXTENDED RELEASE ORAL EVERY 12 HOURS
Refills: 0 | Status: DISCONTINUED | OUTPATIENT
Start: 2019-12-23 | End: 2019-12-24

## 2019-12-23 RX ADMIN — PIPERACILLIN AND TAZOBACTAM 25 GRAM(S): 4; .5 INJECTION, POWDER, LYOPHILIZED, FOR SOLUTION INTRAVENOUS at 12:49

## 2019-12-23 RX ADMIN — PIPERACILLIN AND TAZOBACTAM 25 GRAM(S): 4; .5 INJECTION, POWDER, LYOPHILIZED, FOR SOLUTION INTRAVENOUS at 05:08

## 2019-12-23 RX ADMIN — INSULIN GLARGINE 5 UNIT(S): 100 INJECTION, SOLUTION SUBCUTANEOUS at 21:44

## 2019-12-23 RX ADMIN — Medication 250 MILLIGRAM(S): at 17:07

## 2019-12-23 RX ADMIN — CARVEDILOL PHOSPHATE 3.12 MILLIGRAM(S): 80 CAPSULE, EXTENDED RELEASE ORAL at 17:07

## 2019-12-23 RX ADMIN — Medication 250 MILLIGRAM(S): at 05:09

## 2019-12-23 RX ADMIN — PIPERACILLIN AND TAZOBACTAM 25 GRAM(S): 4; .5 INJECTION, POWDER, LYOPHILIZED, FOR SOLUTION INTRAVENOUS at 21:45

## 2019-12-23 RX ADMIN — ENOXAPARIN SODIUM 40 MILLIGRAM(S): 100 INJECTION SUBCUTANEOUS at 11:14

## 2019-12-23 NOTE — PROGRESS NOTE ADULT - ASSESSMENT
A:  Left 2nd digit cellulitis    P:  Patient evaluated and chart reviewed  Recommend abx per primary team recommendations   X-ray reviewed - results as noted above  Recommend MRI to r/o any bone infections - pending -   Left 2nd digit dressed with betadine DSD   Pt to remain WBing as tolerated to left foot  Podiatry will follow while patient is admitted  Discussed with attending Dr. Mae

## 2019-12-23 NOTE — PROGRESS NOTE ADULT - SUBJECTIVE AND OBJECTIVE BOX
BLANCA MONTERROSO  ----------------------------------------  The patient was seen and evaluated for toe infection. Offers no complaints.    Vital Signs Last 24 Hrs  T(C): 36.7 (22 Dec 2019 15:24), Max: 36.7 (22 Dec 2019 15:24)  T(F): 98.1 (22 Dec 2019 15:24), Max: 98.1 (22 Dec 2019 15:24)  HR: --  BP: 127/70 (22 Dec 2019 15:24) (127/70 - 127/70)  BP(mean): --  RR: 18 (22 Dec 2019 15:24) (18 - 18)  SpO2: 98% (22 Dec 2019 15:24) (98% - 98%)    CAPILLARY BLOOD GLUCOSE  POCT Blood Glucose.: 98 mg/dL (23 Dec 2019 07:34)  POCT Blood Glucose.: 96 mg/dL (22 Dec 2019 21:19)  POCT Blood Glucose.: 102 mg/dL (22 Dec 2019 16:47)  POCT Blood Glucose.: 87 mg/dL (22 Dec 2019 12:45)    PHYSICAL EXAMINATION:  ----------------------------------------  General appearance: No acute distress, Awake, Alert  HEENT: Normocephalic, Atraumatic, Conjunctiva clear, EOMI  Neck: Supple, No JVD, No tenderness  Lungs: Breath sound equal bilaterally, No wheezes, No rales  Cardiovascular: S1S2, Regular rhythm  Abdomen: Soft, Nontender, Nondistended, No guarding/rebound, Positive bowel sounds  Extremities: No clubbing, No cyanosis, No edema, No calf tenderness, Left foot dressing clean and intact  Neuro: Strength equal bilaterally, No tremors  Psychiatric: Appropriate mood, Normal affect    LABORATORY STUDIES:  ----------------------------------------             11.6   5.84  )-----------( 134      ( 23 Dec 2019 06:44 )             36.8     12-23    143  |  107  |  19.0  ----------------------------<  96  4.3   |  23.0  |  1.04    Ca    8.6      23 Dec 2019 06:44    MEDICATIONS  (STANDING):  dextrose 5%. 1000 milliLiter(s) (50 mL/Hr) IV Continuous <Continuous>  dextrose 50% Injectable 12.5 Gram(s) IV Push once  dextrose 50% Injectable 25 Gram(s) IV Push once  dextrose 50% Injectable 25 Gram(s) IV Push once  enoxaparin Injectable 40 milliGRAM(s) SubCutaneous daily  influenza   Vaccine 0.5 milliLiter(s) IntraMuscular once  insulin glargine Injectable (LANTUS) 5 Unit(s) SubCutaneous at bedtime  insulin lispro (HumaLOG) corrective regimen sliding scale   SubCutaneous three times a day before meals  piperacillin/tazobactam IVPB.. 3.375 Gram(s) IV Intermittent every 8 hours  saccharomyces boulardii 250 milliGRAM(s) Oral two times a day    MEDICATIONS  (PRN):  dextrose 40% Gel 15 Gram(s) Oral once PRN Blood Glucose LESS THAN 70 milliGRAM(s)/deciliter  glucagon  Injectable 1 milliGRAM(s) IntraMuscular once PRN Glucose LESS THAN 70 milligrams/deciliter      ASSESSMENT / PLAN:  ----------------------------------------  86F with a history of diabetes and pacemaker who referred to the hospital by Podiatry for left toe infection. Xray of the foot noted erosion of the top of the second toe and intravenous antibiotics were initiated.    Toe infection - Xray with erosion at the top of the second toe concerning for possible osteomyelitis. Awaiting MRI for further evaluation. On piperacillin/tazobactam. Afebrile. Planned for MRI to further investigate.    Diabetes - Insulin coverage, close monitoring of blood glucose levels.    Hypernatremia / Azotemia - Mild, encouraged oral intake.    Cardiomyopathy - Reduced left ventricular function noted on echocardiogram. The patient is currently asymptomatic and without signs of obvious fluids overload. BLANCA MONTERROSO  ----------------------------------------  The patient was seen and evaluated for toe infection. Offers no complaints.    Vital Signs Last 24 Hrs  T(C): 36.7 (22 Dec 2019 15:24), Max: 36.7 (22 Dec 2019 15:24)  T(F): 98.1 (22 Dec 2019 15:24), Max: 98.1 (22 Dec 2019 15:24)  HR: --  BP: 127/70 (22 Dec 2019 15:24) (127/70 - 127/70)  BP(mean): --  RR: 18 (22 Dec 2019 15:24) (18 - 18)  SpO2: 98% (22 Dec 2019 15:24) (98% - 98%)    CAPILLARY BLOOD GLUCOSE  POCT Blood Glucose.: 98 mg/dL (23 Dec 2019 07:34)  POCT Blood Glucose.: 96 mg/dL (22 Dec 2019 21:19)  POCT Blood Glucose.: 102 mg/dL (22 Dec 2019 16:47)  POCT Blood Glucose.: 87 mg/dL (22 Dec 2019 12:45)    PHYSICAL EXAMINATION:  ----------------------------------------  General appearance: No acute distress, Awake, Alert  HEENT: Normocephalic, Atraumatic, Conjunctiva clear, EOMI  Neck: Supple, No JVD, No tenderness  Lungs: Breath sound equal bilaterally, No wheezes, No rales  Cardiovascular: S1S2, Regular rhythm  Abdomen: Soft, Nontender, Nondistended, No guarding/rebound, Positive bowel sounds  Extremities: No clubbing, No cyanosis, No edema, No calf tenderness, Left foot dressing clean and intact  Neuro: Strength equal bilaterally, No tremors  Psychiatric: Appropriate mood, Normal affect    LABORATORY STUDIES:  ----------------------------------------             11.6   5.84  )-----------( 134      ( 23 Dec 2019 06:44 )             36.8     12-23    143  |  107  |  19.0  ----------------------------<  96  4.3   |  23.0  |  1.04    Ca    8.6      23 Dec 2019 06:44    MEDICATIONS  (STANDING):  dextrose 5%. 1000 milliLiter(s) (50 mL/Hr) IV Continuous <Continuous>  dextrose 50% Injectable 12.5 Gram(s) IV Push once  dextrose 50% Injectable 25 Gram(s) IV Push once  dextrose 50% Injectable 25 Gram(s) IV Push once  enoxaparin Injectable 40 milliGRAM(s) SubCutaneous daily  influenza   Vaccine 0.5 milliLiter(s) IntraMuscular once  insulin glargine Injectable (LANTUS) 5 Unit(s) SubCutaneous at bedtime  insulin lispro (HumaLOG) corrective regimen sliding scale   SubCutaneous three times a day before meals  piperacillin/tazobactam IVPB.. 3.375 Gram(s) IV Intermittent every 8 hours  saccharomyces boulardii 250 milliGRAM(s) Oral two times a day    MEDICATIONS  (PRN):  dextrose 40% Gel 15 Gram(s) Oral once PRN Blood Glucose LESS THAN 70 milliGRAM(s)/deciliter  glucagon  Injectable 1 milliGRAM(s) IntraMuscular once PRN Glucose LESS THAN 70 milligrams/deciliter      ASSESSMENT / PLAN:  ----------------------------------------  86F with a history of diabetes and pacemaker who referred to the hospital by Podiatry for left toe infection. Xray of the foot noted erosion of the top of the second toe and intravenous antibiotics were initiated.    Toe infection - Xray with erosion at the top of the second toe concerning for possible osteomyelitis. Awaiting MRI for further evaluation. On piperacillin/tazobactam. Afebrile. Planned for MRI to further investigate.    Diabetes - Insulin coverage, close monitoring of blood glucose levels.    Hypernatremia / Azotemia - Mild, encouraged oral intake.    Cardiomyopathy - Reduced left ventricular function noted on echocardiogram. The patient is currently asymptomatic and without signs of obvious fluids overload. Carvedilol to be restarted.

## 2019-12-23 NOTE — PROGRESS NOTE ADULT - SUBJECTIVE AND OBJECTIVE BOX
Patient is a 86y old  Female who presents with a chief complaint of left 2nd digit pain    HPI: 87 y/o F pt with PMHx of DM presents to the ED c/o left toe pain. pain at left 2nd toe, getting worse. patient had toe nails cut and obtained an infection. no trauma. Denies fever, chills, SOB, CP, abd pain    History as noted above. Pt presents with pain in her left second digit that she believes to be infected following having her nails cut. Pt denies any current F/V/N/C/SOB. Pt denies any current burning, numbness, or tingling.     PMH:No pertinent past medical history    Allergies: No Known Allergies    Medications: vancomycin  IVPB 1000 milliGRAM(s) IV Intermittent once    FH:No pertinent family history in first degree relatives    PSX: No significant past surgical history    SH: vancomycin  IVPB 1000 milliGRAM(s) IV Intermittent once    ICU Vital Signs Last 24 Hrs  T(C): 36.7 (22 Dec 2019 15:24), Max: 36.7 (22 Dec 2019 15:24)  T(F): 98.1 (22 Dec 2019 15:24), Max: 98.1 (22 Dec 2019 15:24)  HR: --  BP: 127/70 (22 Dec 2019 15:24) (127/70 - 127/70)  BP(mean): --  ABP: --  ABP(mean): --  RR: 18 (22 Dec 2019 15:24) (18 - 18)  SpO2: 98% (22 Dec 2019 15:24) (98% - 98%)    LABS:                          11.6   5.84  )-----------( 134      ( 23 Dec 2019 06:44 )             36.8     12-23    143  |  107  |  19.0  ----------------------------<  96  4.3   |  23.0  |  1.04    Ca    8.6      23 Dec 2019 06:44    ROS  REVIEW OF SYSTEMS:    CONSTITUTIONAL: No fever, weight loss, or fatigue  EYES: No eye pain, visual disturbances, or discharge  ENMT:  No difficulty hearing, tinnitus, vertigo; No sinus or throat pain  NECK: No pain or stiffness  BREASTS: No pain, masses, or nipple discharge  RESPIRATORY: No cough, wheezing, chills or hemoptysis; No shortness of breath  CARDIOVASCULAR: No chest pain, palpitations, dizziness, or leg swelling  GASTROINTESTINAL: No abdominal or epigastric pain. No nausea, vomiting, or hematemesis; No diarrhea or constipation. No melena or hematochezia.  GENITOURINARY: No dysuria, frequency, hematuria, or incontinence  NEUROLOGICAL: No headaches, memory loss, loss of strength, numbness, or tremors  SKIN: No itching, burning, rashes, or lesions   LYMPH NODES: No enlarged glands  ENDOCRINE: No heat or cold intolerance; No hair loss  MUSCULOSKELETAL: No joint pain or swelling; No muscle, back, or extremity pain  PSYCHIATRIC: No depression, anxiety, mood swings, or difficulty sleeping  HEME/LYMPH: No easy bruising, or bleeding gums  ALLERY AND IMMUNOLOGIC: No hives or eczema      PHYSICAL EXAM  GEN: BLANCA MONTERROSO is a pleasant well-nourished, well developed 86y Female in no acute distress, alert awake, and oriented to person, place and time.   LE Focused:    Vasc: DP/PT palpable; CFT <3 sec x 10; TG WNL; minimal edema and erythema noted to left 2nd digit  Derm: No IDM, erythema with superficial abrasion noted to distal tip of left second digit; edema noted  Neuro: Protective sensation grossly intact  MSK: Hammered digits noted 2-5 on left     Imaging: < from: Xray Foot AP + Lateral + Oblique, Left (12.19.19 @ 14:20) >     EXAM:  FOOT-LEFT                          PROCEDURE DATE:  12/19/2019          INTERPRETATION:  Radiographs of the left foot         CLINICAL INFORMATION:   Foot pain. Attention second toe    TECHNIQUE:  Frontal, oblique and lateral views of the foot were obtained.    FINDINGS:   No prior similar studies are available for review.    There is diffuse osteopenia.    The forefoot demonstrates no evidence of fracture. Normal alignment is seen. There is severe degenerative change at the second metatarsophalangeal joint. Hallux valgus is seen. There is deformity of the third metatarsal tarsal head which may indicate old fracture. There is suggestion of erosion at the top of the second toe. Sesamoids are intact.    The midfoot appears intact.    The hindfoot demonstrates a small plantar calcaneal spur.    There is no soft tissue swelling.    IMPRESSION:   Erosion at the top of the second toe may indicate osteomyelitis. Further evaluation with MR imaging is recommended  Hallux valgus and degenerative change first MTP joint.                     TO BECKFORD M.D.,ATTENDING RADIOLOGIST  This document has been electronically signed. Dec 19 2019  2:36PM

## 2019-12-24 LAB
CULTURE RESULTS: SIGNIFICANT CHANGE UP
CULTURE RESULTS: SIGNIFICANT CHANGE UP
GLUCOSE BLDC GLUCOMTR-MCNC: 142 MG/DL — HIGH (ref 70–99)
GLUCOSE BLDC GLUCOMTR-MCNC: 151 MG/DL — HIGH (ref 70–99)
GLUCOSE BLDC GLUCOMTR-MCNC: 189 MG/DL — HIGH (ref 70–99)
GLUCOSE BLDC GLUCOMTR-MCNC: 95 MG/DL — SIGNIFICANT CHANGE UP (ref 70–99)
SPECIMEN SOURCE: SIGNIFICANT CHANGE UP
SPECIMEN SOURCE: SIGNIFICANT CHANGE UP

## 2019-12-24 PROCEDURE — 99232 SBSQ HOSP IP/OBS MODERATE 35: CPT

## 2019-12-24 RX ORDER — CARVEDILOL PHOSPHATE 80 MG/1
6.25 CAPSULE, EXTENDED RELEASE ORAL EVERY 12 HOURS
Refills: 0 | Status: DISCONTINUED | OUTPATIENT
Start: 2019-12-24 | End: 2019-12-27

## 2019-12-24 RX ADMIN — Medication 2: at 17:02

## 2019-12-24 RX ADMIN — INSULIN GLARGINE 5 UNIT(S): 100 INJECTION, SOLUTION SUBCUTANEOUS at 22:10

## 2019-12-24 RX ADMIN — Medication 2: at 11:42

## 2019-12-24 RX ADMIN — Medication 250 MILLIGRAM(S): at 05:15

## 2019-12-24 RX ADMIN — PIPERACILLIN AND TAZOBACTAM 25 GRAM(S): 4; .5 INJECTION, POWDER, LYOPHILIZED, FOR SOLUTION INTRAVENOUS at 22:10

## 2019-12-24 RX ADMIN — CARVEDILOL PHOSPHATE 6.25 MILLIGRAM(S): 80 CAPSULE, EXTENDED RELEASE ORAL at 17:02

## 2019-12-24 RX ADMIN — PIPERACILLIN AND TAZOBACTAM 25 GRAM(S): 4; .5 INJECTION, POWDER, LYOPHILIZED, FOR SOLUTION INTRAVENOUS at 05:15

## 2019-12-24 RX ADMIN — ENOXAPARIN SODIUM 40 MILLIGRAM(S): 100 INJECTION SUBCUTANEOUS at 11:43

## 2019-12-24 RX ADMIN — CARVEDILOL PHOSPHATE 3.12 MILLIGRAM(S): 80 CAPSULE, EXTENDED RELEASE ORAL at 05:15

## 2019-12-24 RX ADMIN — Medication 250 MILLIGRAM(S): at 17:02

## 2019-12-24 RX ADMIN — PIPERACILLIN AND TAZOBACTAM 25 GRAM(S): 4; .5 INJECTION, POWDER, LYOPHILIZED, FOR SOLUTION INTRAVENOUS at 13:36

## 2019-12-24 NOTE — PROGRESS NOTE ADULT - SUBJECTIVE AND OBJECTIVE BOX
BLANCA MONTERROSO  ----------------------------------------  The patient was seen and evaluated for toe infection. Offers no complaints.    Vital Signs Last 24 Hrs  T(C): 36.7 (24 Dec 2019 08:28), Max: 36.7 (24 Dec 2019 04:59)  T(F): 98 (24 Dec 2019 08:28), Max: 98.1 (24 Dec 2019 04:59)  HR: 76 (24 Dec 2019 08:28) (76 - 92)  BP: 127/68 (24 Dec 2019 08:28) (121/75 - 133/79)  BP(mean): --  RR: 19 (24 Dec 2019 08:28) (18 - 20)  SpO2: 96% (24 Dec 2019 08:28) (95% - 97%)    CAPILLARY BLOOD GLUCOSE  POCT Blood Glucose.: 95 mg/dL (24 Dec 2019 07:12)  POCT Blood Glucose.: 212 mg/dL (23 Dec 2019 21:16)  POCT Blood Glucose.: 85 mg/dL (23 Dec 2019 16:02)  POCT Blood Glucose.: 116 mg/dL (23 Dec 2019 11:14)    PHYSICAL EXAMINATION:  ----------------------------------------  General appearance: No acute distress, Awake, Alert  HEENT: Normocephalic, Atraumatic, Conjunctiva clear, EOMI  Neck: Supple, No JVD, No tenderness  Lungs: Breath sound equal bilaterally, No wheezes, No rales  Cardiovascular: S1S2, Regular rhythm  Abdomen: Soft, Nontender, Nondistended, No guarding/rebound, Positive bowel sounds  Extremities: No clubbing, No cyanosis, No edema, No calf tenderness, Left foot dressing clean and intact  Neuro: Strength equal bilaterally, No tremors  Psychiatric: Appropriate mood, Normal affect    LABORATORY STUDIES:  ----------------------------------------             11.6   5.84  )-----------( 134      ( 23 Dec 2019 06:44 )             36.8     12-23    143  |  107  |  19.0  ----------------------------<  96  4.3   |  23.0  |  1.04    Ca    8.6      23 Dec 2019 06:44    MEDICATIONS  (STANDING):  carvedilol 3.125 milliGRAM(s) Oral every 12 hours  dextrose 5%. 1000 milliLiter(s) (50 mL/Hr) IV Continuous <Continuous>  dextrose 50% Injectable 12.5 Gram(s) IV Push once  dextrose 50% Injectable 25 Gram(s) IV Push once  dextrose 50% Injectable 25 Gram(s) IV Push once  enoxaparin Injectable 40 milliGRAM(s) SubCutaneous daily  influenza   Vaccine 0.5 milliLiter(s) IntraMuscular once  insulin glargine Injectable (LANTUS) 5 Unit(s) SubCutaneous at bedtime  insulin lispro (HumaLOG) corrective regimen sliding scale   SubCutaneous three times a day before meals  piperacillin/tazobactam IVPB.. 3.375 Gram(s) IV Intermittent every 8 hours  saccharomyces boulardii 250 milliGRAM(s) Oral two times a day    MEDICATIONS  (PRN):  dextrose 40% Gel 15 Gram(s) Oral once PRN Blood Glucose LESS THAN 70 milliGRAM(s)/deciliter  glucagon  Injectable 1 milliGRAM(s) IntraMuscular once PRN Glucose LESS THAN 70 milligrams/deciliter      ASSESSMENT / PLAN:  ----------------------------------------  86F with a history of diabetes and pacemaker who referred to the hospital by Podiatry for left toe infection. Xray of the foot noted erosion of the top of the second toe and intravenous antibiotics were initiated.    Toe infection - On piperacillin/tazobactam. Afebrile. Xray with erosion at the top of the second toe concerning for possible osteomyelitis. Awaiting MRI for further evaluation.    Diabetes - Insulin coverage, close monitoring of blood glucose levels.    Hypernatremia / Azotemia - Mild, encouraged oral intake.    Cardiomyopathy - Reduced left ventricular function noted on echocardiogram. The patient is currently asymptomatic and without signs of obvious fluids overload. Carvedilol dose to be titrated as blood pressure allows.

## 2019-12-24 NOTE — PROGRESS NOTE ADULT - SUBJECTIVE AND OBJECTIVE BOX
[facility]  INFECTIOUS DISEASES AND INTERNAL MEDICINE at [place]  =======================================================  Leandro Monk MD  Diplomates American Board of Internal Medicine and Infectious Diseases  Tel: [phone]      Fax: [phone]  =======================================================    BLANCA MONTERROSO [id number]    Seen with     Follow up: Foot infection    No chest pain    No diarrhea      Allergies:  No Known Allergies      Antibiotics  piperacillin/tazobactam IVPB.. 3.375 Gram(s) IV Intermittent every 8 hours      REVIEW OF SYSTEMS:  CONSTITUTIONAL:  No Fever or chills  HEENT:  No diplopia or blurred vision.  No earache, sore throat or runny nose.  CARDIOVASCULAR:  No pressure, squeezing, strangling, tightness, heaviness or aching about the chest, neck, axilla or epigastrium.  RESPIRATORY:  No cough, shortness of breath. + STINSON  GASTROINTESTINAL:  No nausea, vomiting or diarrhea.  GENITOURINARY:  No dysuria, frequency or urgency.   MUSCULOSKELETAL:  no joint aches, no muscle pain  SKIN:  Left foot 2nd toe with dry gangrene at the tip  NEUROLOGIC:  No Headaches, seizures   PSYCHIATRIC:  No disorder of thought or mood.  ENDOCRINE:  No heat or cold intolerance  HEMATOLOGICAL:  No easy bruising or bleeding.       Physical Exam:  Vital Signs Last 24 Hrs  T(C): 36.7 (24 Dec 2019 08:28), Max: 36.7 (24 Dec 2019 04:59)  T(F): 98 (24 Dec 2019 08:28), Max: 98.1 (24 Dec 2019 04:59)  HR: 76 (24 Dec 2019 08:28) (76 - 92)  BP: 127/68 (24 Dec 2019 08:28) (121/75 - 133/79)  RR: 19 (24 Dec 2019 08:28) (18 - 20)  SpO2: 96% (24 Dec 2019 08:28) (95% - 97%)      GEN: NAD, pleasant  HEENT: normocephalic and atraumatic. EOMI. PERRL.  Anicteric  NECK: Supple.   LUNGS: Clear to auscultation.  HEART: Regular rate and rhythm   ABDOMEN: Soft, nontender, and nondistended.  Positive bowel sounds.    : No CVA tenderness  EXTREMITIES: Without any edema.  MSK: No joint swelling  NEUROLOGIC: No Focal Deficits  PSYCHIATRIC: Appropriate affect .  SKIN: Left second toe with dry gangrene on the tip      Labs:  12-23    143  |  107  |  19.0  ----------------------------<  96  4.3   |  23.0  |  1.04    Ca    8.6      23 Dec 2019 06:44                            11.6   5.84  )-----------( 134      ( 23 Dec 2019 06:44 )             36.8       RECENT CULTURES:  12-19 @ 18:46 .Urine     No growth      12-19 @ 16:27 .Blood     No growth at 48 hours      12-19 @ 15:25 .Blood     No growth at 48 hours

## 2019-12-24 NOTE — PROGRESS NOTE ADULT - ASSESSMENT
87y/o  Female with h/o DM. Patient comes in with left 2nd toe discoloration. Reports she had toe nailed clipped about 1 month ago. There was incidental clip injury to tip of left 2nd toe.  Since the toe has become discolored, tender, swollen and she hs had difficulty ambulating or bearing weight on left foot. In the ER patient was afebrile, no leukocytosis. Started on Zosyn.      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 OM of 2nd toe  - Podiatry eval noted, plan for MRI  - Cardiology note appreciated - PPM is compatible with MRI   - ESR 7  - CRP <0.04  - Continue Zosyn  - Trend Fever  - Trend Leukocytosis      Will Follow

## 2019-12-25 LAB
GLUCOSE BLDC GLUCOMTR-MCNC: 128 MG/DL — HIGH (ref 70–99)
GLUCOSE BLDC GLUCOMTR-MCNC: 194 MG/DL — HIGH (ref 70–99)
GLUCOSE BLDC GLUCOMTR-MCNC: 196 MG/DL — HIGH (ref 70–99)
GLUCOSE BLDC GLUCOMTR-MCNC: 81 MG/DL — SIGNIFICANT CHANGE UP (ref 70–99)
GLUCOSE BLDC GLUCOMTR-MCNC: 82 MG/DL — SIGNIFICANT CHANGE UP (ref 70–99)

## 2019-12-25 PROCEDURE — 99232 SBSQ HOSP IP/OBS MODERATE 35: CPT

## 2019-12-25 RX ADMIN — PIPERACILLIN AND TAZOBACTAM 25 GRAM(S): 4; .5 INJECTION, POWDER, LYOPHILIZED, FOR SOLUTION INTRAVENOUS at 21:53

## 2019-12-25 RX ADMIN — Medication 2: at 18:23

## 2019-12-25 RX ADMIN — PIPERACILLIN AND TAZOBACTAM 25 GRAM(S): 4; .5 INJECTION, POWDER, LYOPHILIZED, FOR SOLUTION INTRAVENOUS at 14:27

## 2019-12-25 RX ADMIN — Medication 250 MILLIGRAM(S): at 17:52

## 2019-12-25 RX ADMIN — ENOXAPARIN SODIUM 40 MILLIGRAM(S): 100 INJECTION SUBCUTANEOUS at 12:23

## 2019-12-25 RX ADMIN — CARVEDILOL PHOSPHATE 6.25 MILLIGRAM(S): 80 CAPSULE, EXTENDED RELEASE ORAL at 05:56

## 2019-12-25 RX ADMIN — Medication 250 MILLIGRAM(S): at 05:56

## 2019-12-25 RX ADMIN — CARVEDILOL PHOSPHATE 6.25 MILLIGRAM(S): 80 CAPSULE, EXTENDED RELEASE ORAL at 17:52

## 2019-12-25 RX ADMIN — PIPERACILLIN AND TAZOBACTAM 25 GRAM(S): 4; .5 INJECTION, POWDER, LYOPHILIZED, FOR SOLUTION INTRAVENOUS at 05:56

## 2019-12-25 RX ADMIN — INSULIN GLARGINE 5 UNIT(S): 100 INJECTION, SOLUTION SUBCUTANEOUS at 21:53

## 2019-12-25 NOTE — PROGRESS NOTE ADULT - ASSESSMENT
87y/o  Female with h/o DM. Patient comes in with left 2nd toe discoloration. Reports she had toe nailed clipped about 1 month ago. There was incidental clip injury to tip of left 2nd toe.  Since the toe has become discolored, tender, swollen and she hs had difficulty ambulating or bearing weight on left foot. In the ER patient was afebrile, no leukocytosis. Started on Zosyn.      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 OM of 2nd toe  - Podiatry eval noted, plan for MRI  - Cardiology note appreciated - PPM is compatible with MRI   - MRI pending  - ESR 7  - CRP <0.04  - Continue Zosyn  - Trend Fever  - Trend Leukocytosis      Will Follow

## 2019-12-25 NOTE — PROGRESS NOTE ADULT - SUBJECTIVE AND OBJECTIVE BOX
[facility]  INFECTIOUS DISEASES AND INTERNAL MEDICINE at [place]  =======================================================  Leandro Monk MD  Diplomates American Board of Internal Medicine and Infectious Diseases  Tel: [phone]      Fax: [phone]  =======================================================    BLANCA MONTERROSO [id number]    Seen with     Follow up: Foot infection    No chest pain    No diarrhea      Allergies:  No Known Allergies      Antibiotics  piperacillin/tazobactam IVPB.. 3.375 Gram(s) IV Intermittent every 8 hours      REVIEW OF SYSTEMS:  CONSTITUTIONAL:  No Fever or chills  HEENT:  No diplopia or blurred vision.  No earache, sore throat or runny nose.  CARDIOVASCULAR:  No pressure, squeezing, strangling, tightness, heaviness or aching about the chest, neck, axilla or epigastrium.  RESPIRATORY:  No cough, shortness of breath. + STINSON  GASTROINTESTINAL:  No nausea, vomiting or diarrhea.  GENITOURINARY:  No dysuria, frequency or urgency.   MUSCULOSKELETAL:  no joint aches, no muscle pain  SKIN:  Left foot 2nd toe with dry gangrene at the tip  NEUROLOGIC:  No Headaches, seizures   PSYCHIATRIC:  No disorder of thought or mood.  ENDOCRINE:  No heat or cold intolerance  HEMATOLOGICAL:  No easy bruising or bleeding.       Physical Exam:  Vital Signs Last 24 Hrs  T(C): 36.7 (24 Dec 2019 23:30), Max: 36.7 (24 Dec 2019 08:28)  T(F): 98 (24 Dec 2019 23:30), Max: 98 (24 Dec 2019 08:28)  HR: 66 (25 Dec 2019 05:55) (66 - 76)  BP: 124/82 (25 Dec 2019 05:55) (112/53 - 127/68)  RR: 18 (24 Dec 2019 23:30) (18 - 20)  SpO2: 96% (24 Dec 2019 16:02) (96% - 96%)      GEN: NAD, pleasant  HEENT: normocephalic and atraumatic. EOMI. PERRL.  Anicteric  NECK: Supple.   LUNGS: Clear to auscultation.  HEART: Regular rate and rhythm   ABDOMEN: Soft, nontender, and nondistended.  Positive bowel sounds.    : No CVA tenderness  EXTREMITIES: Without any edema.  MSK: No joint swelling  NEUROLOGIC: No Focal Deficits  PSYCHIATRIC: Appropriate affect .  SKIN: Left second toe with dry gangrene on the tip      Labs:  RECENT CULTURES:  12-19 @ 18:46 .Urine     No growth      12-19 @ 16:27 .Blood     No growth at 5 days.      12-19 @ 15:25 .Blood     No growth at 5 days.

## 2019-12-25 NOTE — PROGRESS NOTE ADULT - SUBJECTIVE AND OBJECTIVE BOX
BLANCA RUBIOINOS  ----------------------------------------  The patient was seen and evaluated for toe infections. Offers no complaints.    Vital Signs Last 24 Hrs  T(C): 36.4 (25 Dec 2019 08:18), Max: 36.7 (24 Dec 2019 23:30)  T(F): 97.6 (25 Dec 2019 08:18), Max: 98 (24 Dec 2019 23:30)  HR: 92 (25 Dec 2019 08:18) (66 - 92)  BP: 109/67 (25 Dec 2019 08:18) (109/67 - 124/82)  BP(mean): --  RR: 20 (25 Dec 2019 08:18) (18 - 20)  SpO2: 96% (24 Dec 2019 16:02) (96% - 96%)    CAPILLARY BLOOD GLUCOSE  POCT Blood Glucose.: 128 mg/dL (25 Dec 2019 11:33)  POCT Blood Glucose.: 82 mg/dL (25 Dec 2019 08:04)  POCT Blood Glucose.: 142 mg/dL (24 Dec 2019 22:08)  POCT Blood Glucose.: 189 mg/dL (24 Dec 2019 16:58)    PHYSICAL EXAMINATION:  ----------------------------------------  General appearance: No acute distress, Awake, Alert  HEENT: Normocephalic, Atraumatic, Conjunctiva clear, EOMI  Neck: Supple, No JVD, No tenderness  Lungs: Breath sound equal bilaterally, No wheezes, No rales  Cardiovascular: S1S2, Regular rhythm  Abdomen: Soft, Nontender, Nondistended, No guarding/rebound, Positive bowel sounds  Extremities: No clubbing, No cyanosis, No edema, No calf tenderness, Left foot dressing clean and intact, Discoloration at the tip of the second toe, No drainage  Neuro: Strength equal bilaterally, No tremors  Psychiatric: Appropriate mood, Normal affect    MEDICATIONS  (STANDING):  carvedilol 6.25 milliGRAM(s) Oral every 12 hours  dextrose 5%. 1000 milliLiter(s) (50 mL/Hr) IV Continuous <Continuous>  dextrose 50% Injectable 12.5 Gram(s) IV Push once  dextrose 50% Injectable 25 Gram(s) IV Push once  dextrose 50% Injectable 25 Gram(s) IV Push once  enoxaparin Injectable 40 milliGRAM(s) SubCutaneous daily  influenza   Vaccine 0.5 milliLiter(s) IntraMuscular once  insulin glargine Injectable (LANTUS) 5 Unit(s) SubCutaneous at bedtime  insulin lispro (HumaLOG) corrective regimen sliding scale   SubCutaneous three times a day before meals  piperacillin/tazobactam IVPB.. 3.375 Gram(s) IV Intermittent every 8 hours  saccharomyces boulardii 250 milliGRAM(s) Oral two times a day    MEDICATIONS  (PRN):  dextrose 40% Gel 15 Gram(s) Oral once PRN Blood Glucose LESS THAN 70 milliGRAM(s)/deciliter  glucagon  Injectable 1 milliGRAM(s) IntraMuscular once PRN Glucose LESS THAN 70 milligrams/deciliter      ASSESSMENT / PLAN:  ----------------------------------------  86F with a history of diabetes and pacemaker who referred to the hospital by Podiatry for left toe infection. Xray of the foot noted erosion of the top of the second toe and intravenous antibiotics were initiated.    Toe infection - On piperacillin/tazobactam. Xray with erosion at the top of the second toe concerning for possible osteomyelitis. Awaiting MRI for further evaluation, being coordinated due to pacemaker.    Diabetes - Insulin coverage, close monitoring of blood glucose levels.    Hypernatremia / Azotemia - Mild, encouraged oral intake.    Cardiomyopathy - Reduced left ventricular function noted on echocardiogram. The patient is currently asymptomatic and without signs of obvious fluids overload. On carvedilol.

## 2019-12-26 LAB
ANION GAP SERPL CALC-SCNC: 11 MMOL/L — SIGNIFICANT CHANGE UP (ref 5–17)
BUN SERPL-MCNC: 18 MG/DL — SIGNIFICANT CHANGE UP (ref 8–20)
CALCIUM SERPL-MCNC: 8.9 MG/DL — SIGNIFICANT CHANGE UP (ref 8.6–10.2)
CHLORIDE SERPL-SCNC: 107 MMOL/L — SIGNIFICANT CHANGE UP (ref 98–107)
CO2 SERPL-SCNC: 24 MMOL/L — SIGNIFICANT CHANGE UP (ref 22–29)
CREAT SERPL-MCNC: 0.97 MG/DL — SIGNIFICANT CHANGE UP (ref 0.5–1.3)
GLUCOSE BLDC GLUCOMTR-MCNC: 112 MG/DL — HIGH (ref 70–99)
GLUCOSE BLDC GLUCOMTR-MCNC: 142 MG/DL — HIGH (ref 70–99)
GLUCOSE BLDC GLUCOMTR-MCNC: 87 MG/DL — SIGNIFICANT CHANGE UP (ref 70–99)
GLUCOSE BLDC GLUCOMTR-MCNC: 96 MG/DL — SIGNIFICANT CHANGE UP (ref 70–99)
GLUCOSE SERPL-MCNC: 99 MG/DL — SIGNIFICANT CHANGE UP (ref 70–115)
HCT VFR BLD CALC: 35.5 % — SIGNIFICANT CHANGE UP (ref 34.5–45)
HGB BLD-MCNC: 11.1 G/DL — LOW (ref 11.5–15.5)
MCHC RBC-ENTMCNC: 31.3 GM/DL — LOW (ref 32–36)
MCHC RBC-ENTMCNC: 31.6 PG — SIGNIFICANT CHANGE UP (ref 27–34)
MCV RBC AUTO: 101.1 FL — HIGH (ref 80–100)
PLATELET # BLD AUTO: 148 K/UL — LOW (ref 150–400)
POTASSIUM SERPL-MCNC: 4.2 MMOL/L — SIGNIFICANT CHANGE UP (ref 3.5–5.3)
POTASSIUM SERPL-SCNC: 4.2 MMOL/L — SIGNIFICANT CHANGE UP (ref 3.5–5.3)
RBC # BLD: 3.51 M/UL — LOW (ref 3.8–5.2)
RBC # FLD: 15.2 % — HIGH (ref 10.3–14.5)
SODIUM SERPL-SCNC: 142 MMOL/L — SIGNIFICANT CHANGE UP (ref 135–145)
WBC # BLD: 4.04 K/UL — SIGNIFICANT CHANGE UP (ref 3.8–10.5)
WBC # FLD AUTO: 4.04 K/UL — SIGNIFICANT CHANGE UP (ref 3.8–10.5)

## 2019-12-26 PROCEDURE — 99232 SBSQ HOSP IP/OBS MODERATE 35: CPT

## 2019-12-26 PROCEDURE — 73718 MRI LOWER EXTREMITY W/O DYE: CPT | Mod: 26,LT

## 2019-12-26 RX ADMIN — Medication 250 MILLIGRAM(S): at 17:06

## 2019-12-26 RX ADMIN — PIPERACILLIN AND TAZOBACTAM 25 GRAM(S): 4; .5 INJECTION, POWDER, LYOPHILIZED, FOR SOLUTION INTRAVENOUS at 14:18

## 2019-12-26 RX ADMIN — PIPERACILLIN AND TAZOBACTAM 25 GRAM(S): 4; .5 INJECTION, POWDER, LYOPHILIZED, FOR SOLUTION INTRAVENOUS at 05:22

## 2019-12-26 RX ADMIN — CARVEDILOL PHOSPHATE 6.25 MILLIGRAM(S): 80 CAPSULE, EXTENDED RELEASE ORAL at 17:06

## 2019-12-26 RX ADMIN — INSULIN GLARGINE 5 UNIT(S): 100 INJECTION, SOLUTION SUBCUTANEOUS at 22:06

## 2019-12-26 RX ADMIN — PIPERACILLIN AND TAZOBACTAM 25 GRAM(S): 4; .5 INJECTION, POWDER, LYOPHILIZED, FOR SOLUTION INTRAVENOUS at 22:06

## 2019-12-26 RX ADMIN — CARVEDILOL PHOSPHATE 6.25 MILLIGRAM(S): 80 CAPSULE, EXTENDED RELEASE ORAL at 05:22

## 2019-12-26 RX ADMIN — Medication 250 MILLIGRAM(S): at 05:22

## 2019-12-26 RX ADMIN — ENOXAPARIN SODIUM 40 MILLIGRAM(S): 100 INJECTION SUBCUTANEOUS at 11:49

## 2019-12-26 NOTE — PROGRESS NOTE ADULT - SUBJECTIVE AND OBJECTIVE BOX
[facility]  INFECTIOUS DISEASES AND INTERNAL MEDICINE at [place]  =======================================================  Leandro Monk MD  Diplomates American Board of Internal Medicine and Infectious Diseases  Tel: [phone]      Fax: [phone]  =======================================================    BLANCA MONTERROSO [id number]    Seen with     Follow up: Foot infection    No chest pain    No diarrhea      Allergies:  No Known Allergies      Antibiotics  piperacillin/tazobactam IVPB.. 3.375 Gram(s) IV Intermittent every 8 hours      REVIEW OF SYSTEMS:  CONSTITUTIONAL:  No Fever or chills  HEENT:  No diplopia or blurred vision.  No earache, sore throat or runny nose.  CARDIOVASCULAR:  No pressure, squeezing, strangling, tightness, heaviness or aching about the chest, neck, axilla or epigastrium.  RESPIRATORY:  No cough, shortness of breath. + STINSON  GASTROINTESTINAL:  No nausea, vomiting or diarrhea.  GENITOURINARY:  No dysuria, frequency or urgency.   MUSCULOSKELETAL:  no joint aches, no muscle pain  SKIN:  Left foot 2nd toe with dry gangrene at the tip  NEUROLOGIC:  No Headaches, seizures   PSYCHIATRIC:  No disorder of thought or mood.  ENDOCRINE:  No heat or cold intolerance  HEMATOLOGICAL:  No easy bruising or bleeding.       Physical Exam:  Vital Signs Last 24 Hrs  T(C): 36.3 (26 Dec 2019 08:10), Max: 36.8 (25 Dec 2019 16:49)  T(F): 97.4 (26 Dec 2019 08:10), Max: 98.2 (25 Dec 2019 16:49)  HR: 69 (26 Dec 2019 08:10) (69 - 83)  BP: 121/61 (26 Dec 2019 08:10) (121/61 - 129/61)  RR: 19 (26 Dec 2019 08:10) (19 - 20)  SpO2: 95% (26 Dec 2019 08:10) (93% - 95%)      GEN: NAD, pleasant  HEENT: normocephalic and atraumatic. EOMI. PERRL.  Anicteric  NECK: Supple.   LUNGS: Clear to auscultation.  HEART: Regular rate and rhythm   ABDOMEN: Soft, nontender, and nondistended.  Positive bowel sounds.    : No CVA tenderness  EXTREMITIES: Without any edema.  MSK: No joint swelling  NEUROLOGIC: No Focal Deficits  PSYCHIATRIC: Appropriate affect .  SKIN: Left second toe with dry gangrene on the tip      Labs:                       11.1   4.04  )-----------( 148      ( 26 Dec 2019 08:47 )             35.5       RECENT CULTURES:  12-19 @ 18:46 .Urine     No growth      12-19 @ 16:27 .Blood     No growth at 5 days.      12-19 @ 15:25 .Blood     No growth at 5 days.

## 2019-12-26 NOTE — PROGRESS NOTE ADULT - ASSESSMENT
A:  Left 2nd digit cellulitis    P:  Patient evaluated and chart reviewed  Recommend abx per primary team recommendations   X-ray reviewed - results as noted above  Recommend MRI to r/o any bone infections - pending   Left 2nd digit dressed with betadine DSD   Pt to remain WBing as tolerated to left foot  Podiatry will follow while patient is admitted  Discussed with attending Dr. Mae

## 2019-12-26 NOTE — PROGRESS NOTE ADULT - SUBJECTIVE AND OBJECTIVE BOX
BLANCA MONTERROSO  ----------------------------------------  The patient was seen and evaluated for     Vital Signs Last 24 Hrs  T(C): 36.3 (26 Dec 2019 08:10), Max: 36.8 (25 Dec 2019 16:49)  T(F): 97.4 (26 Dec 2019 08:10), Max: 98.2 (25 Dec 2019 16:49)  HR: 69 (26 Dec 2019 08:10) (69 - 83)  BP: 121/61 (26 Dec 2019 08:10) (121/61 - 129/61)  BP(mean): --  RR: 19 (26 Dec 2019 08:10) (19 - 20)  SpO2: 95% (26 Dec 2019 08:10) (93% - 95%)    CAPILLARY BLOOD GLUCOSE      POCT Blood Glucose.: 112 mg/dL (26 Dec 2019 11:21)  POCT Blood Glucose.: 87 mg/dL (26 Dec 2019 07:41)  POCT Blood Glucose.: 81 mg/dL (25 Dec 2019 21:20)  POCT Blood Glucose.: 196 mg/dL (25 Dec 2019 18:03)  POCT Blood Glucose.: 194 mg/dL (25 Dec 2019 16:50)    PHYSICAL EXAMINATION:  ----------------------------------------      LABORATORY STUDIES:  ----------------------------------------                        11.1   4.04  )-----------( 148      ( 26 Dec 2019 08:47 )             35.5     12-26    142  |  107  |  18.0  ----------------------------<  99  4.2   |  24.0  |  0.97    Ca    8.9      26 Dec 2019 08:43                        MEDICATIONS  (STANDING):  carvedilol 6.25 milliGRAM(s) Oral every 12 hours  dextrose 5%. 1000 milliLiter(s) (50 mL/Hr) IV Continuous <Continuous>  dextrose 50% Injectable 12.5 Gram(s) IV Push once  dextrose 50% Injectable 25 Gram(s) IV Push once  dextrose 50% Injectable 25 Gram(s) IV Push once  enoxaparin Injectable 40 milliGRAM(s) SubCutaneous daily  influenza   Vaccine 0.5 milliLiter(s) IntraMuscular once  insulin glargine Injectable (LANTUS) 5 Unit(s) SubCutaneous at bedtime  insulin lispro (HumaLOG) corrective regimen sliding scale   SubCutaneous three times a day before meals  piperacillin/tazobactam IVPB.. 3.375 Gram(s) IV Intermittent every 8 hours  saccharomyces boulardii 250 milliGRAM(s) Oral two times a day    MEDICATIONS  (PRN):  dextrose 40% Gel 15 Gram(s) Oral once PRN Blood Glucose LESS THAN 70 milliGRAM(s)/deciliter  glucagon  Injectable 1 milliGRAM(s) IntraMuscular once PRN Glucose LESS THAN 70 milligrams/deciliter      ASSESSMENT / PLAN:  ----------------------------------------    Estimated discharge date BLANCA MONTERROSO  ----------------------------------------  The patient was seen and evaluated for toe infection. Offers no complaints.    Vital Signs Last 24 Hrs  T(C): 36.3 (26 Dec 2019 08:10), Max: 36.8 (25 Dec 2019 16:49)  T(F): 97.4 (26 Dec 2019 08:10), Max: 98.2 (25 Dec 2019 16:49)  HR: 69 (26 Dec 2019 08:10) (69 - 83)  BP: 121/61 (26 Dec 2019 08:10) (121/61 - 129/61)  BP(mean): --  RR: 19 (26 Dec 2019 08:10) (19 - 20)  SpO2: 95% (26 Dec 2019 08:10) (93% - 95%)    CAPILLARY BLOOD GLUCOSE  POCT Blood Glucose.: 112 mg/dL (26 Dec 2019 11:21)  POCT Blood Glucose.: 87 mg/dL (26 Dec 2019 07:41)  POCT Blood Glucose.: 81 mg/dL (25 Dec 2019 21:20)  POCT Blood Glucose.: 196 mg/dL (25 Dec 2019 18:03)  POCT Blood Glucose.: 194 mg/dL (25 Dec 2019 16:50)    PHYSICAL EXAMINATION:  ----------------------------------------  General appearance: No acute distress, Awake, Alert  HEENT: Normocephalic, Atraumatic, Conjunctiva clear, EOMI  Neck: Supple, No JVD, No tenderness  Lungs: Breath sound equal bilaterally, No wheezes, No rales  Cardiovascular: S1S2, Regular rhythm  Abdomen: Soft, Nontender, Nondistended, No guarding/rebound, Positive bowel sounds  Extremities: No clubbing, No cyanosis, No edema, No calf tenderness, Left foot dressing clean and intact, Discoloration at the tip of the second toe, No drainage  Neuro: Strength equal bilaterally, No tremors  Psychiatric: Appropriate mood, Normal affect    LABORATORY STUDIES:  ----------------------------------------             11.1   4.04  )-----------( 148      ( 26 Dec 2019 08:47 )             35.5     12-26    142  |  107  |  18.0  ----------------------------<  99  4.2   |  24.0  |  0.97    Ca    8.9      26 Dec 2019 08:43    MEDICATIONS  (STANDING):  carvedilol 6.25 milliGRAM(s) Oral every 12 hours  dextrose 5%. 1000 milliLiter(s) (50 mL/Hr) IV Continuous <Continuous>  dextrose 50% Injectable 12.5 Gram(s) IV Push once  dextrose 50% Injectable 25 Gram(s) IV Push once  dextrose 50% Injectable 25 Gram(s) IV Push once  enoxaparin Injectable 40 milliGRAM(s) SubCutaneous daily  influenza   Vaccine 0.5 milliLiter(s) IntraMuscular once  insulin glargine Injectable (LANTUS) 5 Unit(s) SubCutaneous at bedtime  insulin lispro (HumaLOG) corrective regimen sliding scale   SubCutaneous three times a day before meals  piperacillin/tazobactam IVPB.. 3.375 Gram(s) IV Intermittent every 8 hours  saccharomyces boulardii 250 milliGRAM(s) Oral two times a day    MEDICATIONS  (PRN):  dextrose 40% Gel 15 Gram(s) Oral once PRN Blood Glucose LESS THAN 70 milliGRAM(s)/deciliter  glucagon  Injectable 1 milliGRAM(s) IntraMuscular once PRN Glucose LESS THAN 70 milligrams/deciliter      ASSESSMENT / PLAN:  ----------------------------------------  86F with a history of diabetes and pacemaker who referred to the hospital by Podiatry for left toe infection. Xray of the foot noted erosion of the top of the second toe and intravenous antibiotics were initiated. The patient was seen by Podiatry and Infectious Disease.    Toe infection - On piperacillin/tazobactam. Xray with erosion at the top of the second toe concerning for possible osteomyelitis. Awaiting MRI for further evaluation.    Diabetes - Insulin coverage, close monitoring of blood glucose levels.    Hypernatremia / Azotemia - Mild, encouraged oral intake.    Cardiomyopathy - On carvedilol. Echocardiogram noted left ventricular function. The patient is currently asymptomatic and without signs of obvious fluids overload.

## 2019-12-26 NOTE — PROGRESS NOTE ADULT - SUBJECTIVE AND OBJECTIVE BOX
Patient is a 86y old  Female who presents with a chief complaint of left 2nd digit pain    HPI: 87 y/o F pt with PMHx of DM presents to the ED c/o left toe pain. pain at left 2nd toe, getting worse. patient had toe nails cut and obtained an infection. no trauma. Denies fever, chills, SOB, CP, abd pain    History as noted above. Pt presents with pain in her left second digit that she believes to be infected following having her nails cut. Pt denies any current F/V/N/C/SOB. Pt denies any current burning, numbness, or tingling.     PMH:No pertinent past medical history    Allergies: No Known Allergies    Medications: vancomycin  IVPB 1000 milliGRAM(s) IV Intermittent once    FH:No pertinent family history in first degree relatives    PSX: No significant past surgical history    SH: vancomycin  IVPB 1000 milliGRAM(s) IV Intermittent once    Vital Signs Last 24 Hrs  T(C): 36.3 (26 Dec 2019 08:10), Max: 36.8 (25 Dec 2019 16:49)  T(F): 97.4 (26 Dec 2019 08:10), Max: 98.2 (25 Dec 2019 16:49)  HR: 69 (26 Dec 2019 08:10) (69 - 83)  BP: 121/61 (26 Dec 2019 08:10) (121/61 - 129/61)  BP(mean): --  RR: 19 (26 Dec 2019 08:10) (19 - 20)  SpO2: 95% (26 Dec 2019 08:10) (93% - 95%)    LABS:                          11.1   4.04  )-----------( 148      ( 26 Dec 2019 08:47 )             35.5     12-26    142  |  107  |  18.0  ----------------------------<  99  4.2   |  24.0  |  0.97    Ca    8.9      26 Dec 2019 08:43    ROS  REVIEW OF SYSTEMS:    CONSTITUTIONAL: No fever, weight loss, or fatigue  EYES: No eye pain, visual disturbances, or discharge  ENMT:  No difficulty hearing, tinnitus, vertigo; No sinus or throat pain  NECK: No pain or stiffness  BREASTS: No pain, masses, or nipple discharge  RESPIRATORY: No cough, wheezing, chills or hemoptysis; No shortness of breath  CARDIOVASCULAR: No chest pain, palpitations, dizziness, or leg swelling  GASTROINTESTINAL: No abdominal or epigastric pain. No nausea, vomiting, or hematemesis; No diarrhea or constipation. No melena or hematochezia.  GENITOURINARY: No dysuria, frequency, hematuria, or incontinence  NEUROLOGICAL: No headaches, memory loss, loss of strength, numbness, or tremors  SKIN: No itching, burning, rashes, or lesions   LYMPH NODES: No enlarged glands  ENDOCRINE: No heat or cold intolerance; No hair loss  MUSCULOSKELETAL: No joint pain or swelling; No muscle, back, or extremity pain  PSYCHIATRIC: No depression, anxiety, mood swings, or difficulty sleeping  HEME/LYMPH: No easy bruising, or bleeding gums  ALLERY AND IMMUNOLOGIC: No hives or eczema      PHYSICAL EXAM  GEN: BLANCA MONTERROSO is a pleasant well-nourished, well developed 86y Female in no acute distress, alert awake, and oriented to person, place and time.   LE Focused:    Vasc: DP/PT palpable; CFT <3 sec x 10; TG WNL; minimal edema and erythema noted to left 2nd digit  Derm: No IDM, erythema with superficial abrasion noted to distal tip of left second digit; edema noted  Neuro: Protective sensation grossly intact  MSK: Hammered digits noted 2-5 on left     Imaging: < from: Xray Foot AP + Lateral + Oblique, Left (12.19.19 @ 14:20) >     EXAM:  FOOT-LEFT                          PROCEDURE DATE:  12/19/2019          INTERPRETATION:  Radiographs of the left foot         CLINICAL INFORMATION:   Foot pain. Attention second toe    TECHNIQUE:  Frontal, oblique and lateral views of the foot were obtained.    FINDINGS:   No prior similar studies are available for review.    There is diffuse osteopenia.    The forefoot demonstrates no evidence of fracture. Normal alignment is seen. There is severe degenerative change at the second metatarsophalangeal joint. Hallux valgus is seen. There is deformity of the third metatarsal tarsal head which may indicate old fracture. There is suggestion of erosion at the top of the second toe. Sesamoids are intact.    The midfoot appears intact.    The hindfoot demonstrates a small plantar calcaneal spur.    There is no soft tissue swelling.    IMPRESSION:   Erosion at the top of the second toe may indicate osteomyelitis. Further evaluation with MR imaging is recommended  Hallux valgus and degenerative change first MTP joint.                     TO BECKFORD M.D.,ATTENDING RADIOLOGIST  This document has been electronically signed. Dec 19 2019  2:36PM

## 2019-12-27 ENCOUNTER — TRANSCRIPTION ENCOUNTER (OUTPATIENT)
Age: 84
End: 2019-12-27

## 2019-12-27 VITALS
OXYGEN SATURATION: 96 % | TEMPERATURE: 97 F | SYSTOLIC BLOOD PRESSURE: 115 MMHG | RESPIRATION RATE: 18 BRPM | HEART RATE: 81 BPM | DIASTOLIC BLOOD PRESSURE: 76 MMHG

## 2019-12-27 LAB
GLUCOSE BLDC GLUCOMTR-MCNC: 100 MG/DL — HIGH (ref 70–99)
GLUCOSE BLDC GLUCOMTR-MCNC: 166 MG/DL — HIGH (ref 70–99)
GLUCOSE BLDC GLUCOMTR-MCNC: 209 MG/DL — HIGH (ref 70–99)

## 2019-12-27 PROCEDURE — 73718 MRI LOWER EXTREMITY W/O DYE: CPT

## 2019-12-27 PROCEDURE — 87086 URINE CULTURE/COLONY COUNT: CPT

## 2019-12-27 PROCEDURE — 87040 BLOOD CULTURE FOR BACTERIA: CPT

## 2019-12-27 PROCEDURE — 93005 ELECTROCARDIOGRAM TRACING: CPT

## 2019-12-27 PROCEDURE — 99285 EMERGENCY DEPT VISIT HI MDM: CPT | Mod: 25

## 2019-12-27 PROCEDURE — 99239 HOSP IP/OBS DSCHRG MGMT >30: CPT

## 2019-12-27 PROCEDURE — 82962 GLUCOSE BLOOD TEST: CPT

## 2019-12-27 PROCEDURE — 80053 COMPREHEN METABOLIC PANEL: CPT

## 2019-12-27 PROCEDURE — 96374 THER/PROPH/DIAG INJ IV PUSH: CPT

## 2019-12-27 PROCEDURE — 99232 SBSQ HOSP IP/OBS MODERATE 35: CPT

## 2019-12-27 PROCEDURE — 85652 RBC SED RATE AUTOMATED: CPT

## 2019-12-27 PROCEDURE — 93970 EXTREMITY STUDY: CPT

## 2019-12-27 PROCEDURE — 36415 COLL VENOUS BLD VENIPUNCTURE: CPT

## 2019-12-27 PROCEDURE — 81001 URINALYSIS AUTO W/SCOPE: CPT

## 2019-12-27 PROCEDURE — 85027 COMPLETE CBC AUTOMATED: CPT

## 2019-12-27 PROCEDURE — 83036 HEMOGLOBIN GLYCOSYLATED A1C: CPT

## 2019-12-27 PROCEDURE — 73630 X-RAY EXAM OF FOOT: CPT

## 2019-12-27 PROCEDURE — 71045 X-RAY EXAM CHEST 1 VIEW: CPT

## 2019-12-27 PROCEDURE — 83605 ASSAY OF LACTIC ACID: CPT

## 2019-12-27 PROCEDURE — T1013: CPT

## 2019-12-27 PROCEDURE — 85730 THROMBOPLASTIN TIME PARTIAL: CPT

## 2019-12-27 PROCEDURE — 93923 UPR/LXTR ART STDY 3+ LVLS: CPT

## 2019-12-27 PROCEDURE — 80048 BASIC METABOLIC PNL TOTAL CA: CPT

## 2019-12-27 PROCEDURE — 86140 C-REACTIVE PROTEIN: CPT

## 2019-12-27 PROCEDURE — C8929: CPT

## 2019-12-27 PROCEDURE — 85610 PROTHROMBIN TIME: CPT

## 2019-12-27 RX ORDER — ERTAPENEM SODIUM 1 G/1
1000 INJECTION, POWDER, LYOPHILIZED, FOR SOLUTION INTRAMUSCULAR; INTRAVENOUS EVERY 24 HOURS
Refills: 0 | Status: DISCONTINUED | OUTPATIENT
Start: 2019-12-27 | End: 2019-12-27

## 2019-12-27 RX ORDER — FERROUS SULFATE 325(65) MG
1 TABLET ORAL
Qty: 0 | Refills: 0 | DISCHARGE

## 2019-12-27 RX ORDER — ERTAPENEM SODIUM 1 G/1
1 INJECTION, POWDER, LYOPHILIZED, FOR SOLUTION INTRAMUSCULAR; INTRAVENOUS
Qty: 0 | Refills: 0 | DISCHARGE
Start: 2019-12-27

## 2019-12-27 RX ORDER — LINEZOLID 600 MG/300ML
600 INJECTION, SOLUTION INTRAVENOUS EVERY 12 HOURS
Refills: 0 | Status: DISCONTINUED | OUTPATIENT
Start: 2019-12-27 | End: 2019-12-27

## 2019-12-27 RX ORDER — SACCHAROMYCES BOULARDII 250 MG
1 POWDER IN PACKET (EA) ORAL
Qty: 56 | Refills: 0
Start: 2019-12-27 | End: 2020-01-23

## 2019-12-27 RX ORDER — CARVEDILOL PHOSPHATE 80 MG/1
1 CAPSULE, EXTENDED RELEASE ORAL
Qty: 0 | Refills: 0 | DISCHARGE

## 2019-12-27 RX ORDER — LINEZOLID 600 MG/300ML
1 INJECTION, SOLUTION INTRAVENOUS
Qty: 56 | Refills: 0
Start: 2019-12-27 | End: 2020-01-23

## 2019-12-27 RX ORDER — ALENDRONATE SODIUM 70 MG/1
1 TABLET ORAL
Qty: 0 | Refills: 0 | DISCHARGE

## 2019-12-27 RX ORDER — VALSARTAN 80 MG/1
1 TABLET ORAL
Qty: 0 | Refills: 0 | DISCHARGE

## 2019-12-27 RX ADMIN — Medication 250 MILLIGRAM(S): at 05:24

## 2019-12-27 RX ADMIN — Medication 2: at 12:03

## 2019-12-27 RX ADMIN — ENOXAPARIN SODIUM 40 MILLIGRAM(S): 100 INJECTION SUBCUTANEOUS at 14:28

## 2019-12-27 RX ADMIN — ERTAPENEM SODIUM 120 MILLIGRAM(S): 1 INJECTION, POWDER, LYOPHILIZED, FOR SOLUTION INTRAMUSCULAR; INTRAVENOUS at 16:40

## 2019-12-27 RX ADMIN — PIPERACILLIN AND TAZOBACTAM 25 GRAM(S): 4; .5 INJECTION, POWDER, LYOPHILIZED, FOR SOLUTION INTRAVENOUS at 05:24

## 2019-12-27 RX ADMIN — Medication 4: at 16:47

## 2019-12-27 RX ADMIN — CARVEDILOL PHOSPHATE 6.25 MILLIGRAM(S): 80 CAPSULE, EXTENDED RELEASE ORAL at 05:24

## 2019-12-27 NOTE — PROGRESS NOTE ADULT - SUBJECTIVE AND OBJECTIVE BOX
BLANCA MONTERROSO  ----------------------------------------  The patient was seen and evaluated for osteomyelitis. Offers no complaints.    Vital Signs Last 24 Hrs  T(C): 36.4 (27 Dec 2019 07:48), Max: 36.6 (26 Dec 2019 17:03)  T(F): 97.5 (27 Dec 2019 07:48), Max: 97.8 (26 Dec 2019 17:03)  HR: 77 (27 Dec 2019 07:48) (77 - 110)  BP: 134/75 (27 Dec 2019 07:48) (124/64 - 134/75)  BP(mean): --  RR: 20 (27 Dec 2019 07:48) (18 - 20)  SpO2: 96% (26 Dec 2019 22:43) (95% - 96%)    CAPILLARY BLOOD GLUCOSE  POCT Blood Glucose.: 100 mg/dL (27 Dec 2019 07:27)  POCT Blood Glucose.: 96 mg/dL (26 Dec 2019 22:04)  POCT Blood Glucose.: 142 mg/dL (26 Dec 2019 17:09)  POCT Blood Glucose.: 112 mg/dL (26 Dec 2019 11:21)    PHYSICAL EXAMINATION:  ----------------------------------------  General appearance: No acute distress, Awake, Alert  HEENT: Normocephalic, Atraumatic, Conjunctiva clear, EOMI  Neck: Supple, No JVD, No tenderness  Lungs: Breath sound equal bilaterally, No wheezes, No rales  Cardiovascular: S1S2, Regular rhythm  Abdomen: Soft, Nontender, Nondistended, No guarding/rebound, Positive bowel sounds  Extremities: No clubbing, No cyanosis, No edema, No calf tenderness, Left foot dressing clean and intact, Discoloration at the tip of the second toe, No drainage  Neuro: Strength equal bilaterally, No tremors  Psychiatric: Appropriate mood, Normal affect    LABORATORY STUDIES:  ----------------------------------------             11.1   4.04  )-----------( 148      ( 26 Dec 2019 08:47 )             35.5     12-26    142  |  107  |  18.0  ----------------------------<  99  4.2   |  24.0  |  0.97    Ca    8.9      26 Dec 2019 08:43    MEDICATIONS  (STANDING):  carvedilol 6.25 milliGRAM(s) Oral every 12 hours  dextrose 5%. 1000 milliLiter(s) (50 mL/Hr) IV Continuous <Continuous>  dextrose 50% Injectable 12.5 Gram(s) IV Push once  dextrose 50% Injectable 25 Gram(s) IV Push once  dextrose 50% Injectable 25 Gram(s) IV Push once  enoxaparin Injectable 40 milliGRAM(s) SubCutaneous daily  influenza   Vaccine 0.5 milliLiter(s) IntraMuscular once  insulin glargine Injectable (LANTUS) 5 Unit(s) SubCutaneous at bedtime  insulin lispro (HumaLOG) corrective regimen sliding scale   SubCutaneous three times a day before meals  piperacillin/tazobactam IVPB.. 3.375 Gram(s) IV Intermittent every 8 hours  saccharomyces boulardii 250 milliGRAM(s) Oral two times a day    MEDICATIONS  (PRN):  dextrose 40% Gel 15 Gram(s) Oral once PRN Blood Glucose LESS THAN 70 milliGRAM(s)/deciliter  glucagon  Injectable 1 milliGRAM(s) IntraMuscular once PRN Glucose LESS THAN 70 milligrams/deciliter      ASSESSMENT / PLAN:  ----------------------------------------  86F with a history of diabetes and pacemaker who referred to the hospital by Podiatry for left toe infection. Xray of the foot noted erosion of the top of the second toe and intravenous antibiotics were initiated. The patient was seen by Podiatry and Infectious Disease.    Toe infection - On piperacillin/tazobactam. MRI noted evidence of osteomyelitis. Discussed with Podiatry, no surgical intervention planned for now.    Diabetes - Insulin coverage, close monitoring of blood glucose levels.    Hypernatremia / Azotemia - Encouraged oral intake. Resolved.    Cardiomyopathy - On carvedilol. Echocardiogram noted left ventricular function. The patient is currently asymptomatic and without signs of obvious fluids overload.

## 2019-12-27 NOTE — DISCHARGE NOTE PROVIDER - NSDCCPCAREPLAN_GEN_ALL_CORE_FT
PRINCIPAL DISCHARGE DIAGNOSIS  Diagnosis: Toe infection  Assessment and Plan of Treatment: Continue on antibiotics as prescribed. Follow up with Infectious Disease and Podiatry for further management.      SECONDARY DISCHARGE DIAGNOSES  Diagnosis: Cardiomyopathy  Assessment and Plan of Treatment: Continue on Coreg. Follow up with your cardiologist.    Diagnosis: Diabetes  Assessment and Plan of Treatment: Resume your home diabetes medications and diet.

## 2019-12-27 NOTE — DISCHARGE NOTE PROVIDER - HOSPITAL COURSE
86F referred to the hospital by her podiatrist for a left toe injury sustained about ten days prior. On presentation, WBC(4.22), H/H(11.1/36.4), Plt(147), BUN/Cr(32/1.25), AST/ALT(51/47). Xray of the left foot noted erosion at the top of the second toe which may indicate osteomyelitis. The patient was seen by Podiatry and Infectious Disease in consultation and the patient was continued on intravenous antibiotics while awaiting further imaging. Blood cultures were without growth. She was seen by Cardiology and the pacemaker was noted to be MRI compatible. Echocardiogram noted moderately decreased global left ventricular systolic function, ejection fraction of 30 to 35%, mildly reduced right ventricular systolic function, severely enlarged left atrium, moderately enlarged right atrium, no pericardial effusion, moderate mitral valve regurgitation, moderate-severe tricuspid regurgitation. MRI of the foot noted osteomyelitis of the toe. The patient was seen by Podiatry and thought not to require surgical intervention at the time. She was seen by Infectious Disease and thought to require intravenous antibiotic therapy at home. Instructions were given to follow up with her primary care physician, podiatrist, and Infectious Disease.            35 minutes total time

## 2019-12-27 NOTE — PROGRESS NOTE ADULT - ASSESSMENT
A:  Left 2nd digit cellulitis    P:  Patient evaluated and chart reviewed  Recommend abx per primary team recommendations   X-ray reviewed - results as noted above  Recommend MRI to r/o any bone infections -  results as noted above   No surgical intervention per Podiatry standpoint - Pt opting for IV abx via PICC  Toe does not appear clinically infected  Consent obtained for bedside flexor tenotomy with witness present - placed in patient's chart   Local anesthetic of 1% Lidocaine plane was infiltrated around the left 2nd digit - 2 ccs   Using a sterile 18 Gauge needle, inserted from the plantar aspect of the 2nd digit, the flexor tendon freed allowing increased DF of the digit  The 2nd left toe was noted to be in a more rectus position  Left 2nd digit dressed with betadine DSD   Pt to remain WBing as tolerated to left foot  Podiatry will follow while patient is admitted  Discussed with attending Dr. Mae

## 2019-12-27 NOTE — PROGRESS NOTE ADULT - SUBJECTIVE AND OBJECTIVE BOX
[facility]  INFECTIOUS DISEASES AND INTERNAL MEDICINE at [place]  =======================================================  Leandro Monk MD  Diplomates American Board of Internal Medicine and Infectious Diseases  Tel: [phone]      Fax: [phone]  =======================================================    BLANCA MONTERROSO [id number]    Seen with     Follow up: Foot infection    No pain, no fever.     Allergies:  No Known Allergies    Antibiotics  piperacillin/tazobactam IVPB.. 3.375 Gram(s) IV Intermittent every 8 hours      REVIEW OF SYSTEMS:  CONSTITUTIONAL:  No Fever or chills  HEENT:  No diplopia or blurred vision.  No earache, sore throat or runny nose.  CARDIOVASCULAR:  No pressure, squeezing, strangling,   RESPIRATORY:  No cough, shortness of breath. + STINSON  GASTROINTESTINAL:  No nausea, vomiting or diarrhea.  GENITOURINARY:  No dysuria, frequency or urgency.   MUSCULOSKELETAL:  no joint aches, no muscle pain  SKIN:  Left foot 2nd toe with dry gangrene at the tip  NEUROLOGIC:  No Headaches, seizures   PSYCHIATRIC:  No disorder of thought or mood.  ENDOCRINE:  No heat or cold intolerance  HEMATOLOGICAL:  No easy bruising or bleeding.       Physical Exam:  Vital Signs Last 24 Hrs  T(C): 36.4 (27 Dec 2019 07:48), Max: 36.6 (26 Dec 2019 17:03)  T(F): 97.5 (27 Dec 2019 07:48), Max: 97.8 (26 Dec 2019 17:03)  HR: 77 (27 Dec 2019 07:48) (77 - 110)  BP: 134/75 (27 Dec 2019 07:48) (124/64 - 134/75)  RR: 20 (27 Dec 2019 07:48) (18 - 20)  SpO2: 96% (26 Dec 2019 22:43) (95% - 96%)  GEN: NAD, pleasant  HEENT: normocephalic and atraumatic. EOMI. PERRL.  Anicteric  NECK: Supple.   LUNGS: Clear to auscultation.  HEART: Regular rate and rhythm   ABDOMEN: Soft, nontender, and nondistended.  Positive bowel sounds.    : No CVA tenderness  EXTREMITIES: Without any edema.  MSK: No joint swelling  NEUROLOGIC: No Focal Deficits  PSYCHIATRIC: Appropriate affect .  SKIN: Left second toe with dry gangrene on the tip      Labs:       12-26    142  |  107  |  18.0  ----------------------------<  99  4.2   |  24.0  |  0.97    Ca    8.9      26 Dec 2019 08:43                            11.1   4.04  )-----------( 148      ( 26 Dec 2019 08:47 )             35.5     RECENT CULTURES:  12-19 @ 18:46 .Urine     No growth    12-19 @ 16:27 .Blood     No growth at 5 days.    12-19 @ 15:25 .Blood     No growth at 5 days.    Imaging:   < from: MR Foot No Cont, Left (12.26.19 @ 15:52) >  EXAM:  MR FOOT LT                        PROCEDURE DATE:  12/26/2019  INTERPRETATION:  PROCEDURE INFORMATION:   Exam: MR Left Lower Extremity Other Than Joint Without Contrast; Foot   Exam date and time: 12/26/2019 3:10 PM   Age: 86 years old   Clinical indication: Pain; Foot; Left. Evaluate for toe osteomyelitis.  TECHNIQUE:   Imaging protocol: MR of the Left lower extremity without contrast. Exam focused   on the foot.   COMPARISON:   DX XR FOOT LEFT 12/19/2019 2:04 PM   FINDINGS:   LIGAMENTS:   Lisfranc ligament: Unremarkable. No evidence of tear.   Bones/joints: T1 signal hypointensity with corresponding signal prolongation on the inversion recovery involving second middle and distal phalanges concerning for acuteosteomyelitis. No additional foci of abnormal marrow signal. No acute fracture. Hallux valgus. Degenerative changes in the first   metatarsophalangeal joint and midfoot.   Soft tissues: Severe cellulitis involving second toe and extending along the   dorsum of the foot.   IMPRESSION:   Acute osteomyelitis involving the middle and distal phalanges of the second toe.      Assessment and Plan:   85y/o  Female with h/o DM. Patient comes in with left 2nd toe discoloration. Reports she had toe nailed clipped about 1 month ago. There was incidental clip injury to tip of left 2nd toe.  Since the toe has become discolored, tender, swollen and she hs had difficulty ambulating or bearing weight on left foot. In the ER patient was afebrile, no leukocytosis. Started on Zosyn. since no plan for surgery as per podiatry, can treat with antibiotics for about 6 weeks, starting with IV for 2 weeks and then switch to po for the rest. Usually we cover MRSA since there is no culture.     Left 2nd toe dry gangrene  diabetic foot infection  Diabetes mellitus type 2  Pacemaker present    - Blood cultures No Growth   - Xray and MRI OM of 2nd toe (distal and middle phalanx)   - ESR 7 and CRP <0.04  - Can switch to Ertapenem 1gm daily and Linezolid 600mg q12 po, total 2 weeks and then switch to po(possibly levaquin and bactrim)   - No SSRI or MAOIs, no wine and cheese.   - Weekly labs CBC and BMP   - Midline is ordered.   - Vascular consult after discharge    Will Follow if remains inpt.    Discussed with Dr. Martinez.

## 2019-12-27 NOTE — DISCHARGE NOTE PROVIDER - NSDCMRMEDTOKEN_GEN_ALL_CORE_FT
alendronate 70 mg oral tablet: 1 tab(s) orally once a week  Coreg 12.5 mg oral tablet: 1 tab(s) orally 2 times a day  ertapenem 1 g injection: 1 gram(s) injectable once a day  ferrous sulfate 325 mg (65 mg elemental iron) oral tablet: 1 tab(s) orally once a day  linezolid 600 mg oral tablet: 1 tab(s) orally every 12 hours  saccharomyces boulardii lyo 250 mg oral capsule: 1 cap(s) orally 2 times a day

## 2019-12-27 NOTE — PROGRESS NOTE ADULT - SUBJECTIVE AND OBJECTIVE BOX
Patient is a 86y old  Female who presents with a chief complaint of left 2nd digit pain    HPI: 87 y/o F pt with PMHx of DM presents to the ED c/o left toe pain. pain at left 2nd toe, getting worse. patient had toe nails cut and obtained an infection. no trauma. Denies fever, chills, SOB, CP, abd pain    History as noted above. Pt presents with pain in her left second digit that she believes to be infected following having her nails cut. Pt denies any current F/V/N/C/SOB. Pt denies any current burning, numbness, or tingling.     PMH:No pertinent past medical history    Allergies: No Known Allergies    Medications: vancomycin  IVPB 1000 milliGRAM(s) IV Intermittent once    FH:No pertinent family history in first degree relatives    PSX: No significant past surgical history    SH: vancomycin  IVPB 1000 milliGRAM(s) IV Intermittent once    Vital Signs Last 24 Hrs  T(C): 36.4 (27 Dec 2019 07:48), Max: 36.6 (26 Dec 2019 17:03)  T(F): 97.5 (27 Dec 2019 07:48), Max: 97.8 (26 Dec 2019 17:03)  HR: 77 (27 Dec 2019 07:48) (77 - 110)  BP: 134/75 (27 Dec 2019 07:48) (124/64 - 134/75)  BP(mean): --  RR: 20 (27 Dec 2019 07:48) (18 - 20)  SpO2: 96% (26 Dec 2019 22:43) (95% - 96%)    LABS:                          11.1   4.04  )-----------( 148      ( 26 Dec 2019 08:47 )             35.5     12-26    142  |  107  |  18.0  ----------------------------<  99  4.2   |  24.0  |  0.97    Ca    8.9      26 Dec 2019 08:43      ROS  REVIEW OF SYSTEMS:    CONSTITUTIONAL: No fever, weight loss, or fatigue  EYES: No eye pain, visual disturbances, or discharge  ENMT:  No difficulty hearing, tinnitus, vertigo; No sinus or throat pain  NECK: No pain or stiffness  BREASTS: No pain, masses, or nipple discharge  RESPIRATORY: No cough, wheezing, chills or hemoptysis; No shortness of breath  CARDIOVASCULAR: No chest pain, palpitations, dizziness, or leg swelling  GASTROINTESTINAL: No abdominal or epigastric pain. No nausea, vomiting, or hematemesis; No diarrhea or constipation. No melena or hematochezia.  GENITOURINARY: No dysuria, frequency, hematuria, or incontinence  NEUROLOGICAL: No headaches, memory loss, loss of strength, numbness, or tremors  SKIN: No itching, burning, rashes, or lesions   LYMPH NODES: No enlarged glands  ENDOCRINE: No heat or cold intolerance; No hair loss  MUSCULOSKELETAL: No joint pain or swelling; No muscle, back, or extremity pain  PSYCHIATRIC: No depression, anxiety, mood swings, or difficulty sleeping  HEME/LYMPH: No easy bruising, or bleeding gums  ALLERY AND IMMUNOLOGIC: No hives or eczema      PHYSICAL EXAM  GEN: BLANCA MONTERROSO is a pleasant well-nourished, well developed 86y Female in no acute distress, alert awake, and oriented to person, place and time.   LE Focused:    Vasc: DP/PT palpable; CFT <3 sec x 10; TG WNL; minimal edema and erythema noted to left 2nd digit  Derm: No IDM, erythema with superficial abrasion noted to distal tip of left second digit; edema noted  Neuro: Protective sensation grossly intact  MSK: Hammered digits noted 2-5 on left     Imaging: < from: Xray Foot AP + Lateral + Oblique, Left (12.19.19 @ 14:20) >     EXAM:  FOOT-LEFT                          PROCEDURE DATE:  12/19/2019          INTERPRETATION:  Radiographs of the left foot         CLINICAL INFORMATION:   Foot pain. Attention second toe    TECHNIQUE:  Frontal, oblique and lateral views of the foot were obtained.    FINDINGS:   No prior similar studies are available for review.    There is diffuse osteopenia.    The forefoot demonstrates no evidence of fracture. Normal alignment is seen. There is severe degenerative change at the second metatarsophalangeal joint. Hallux valgus is seen. There is deformity of the third metatarsal tarsal head which may indicate old fracture. There is suggestion of erosion at the top of the second toe. Sesamoids are intact.    The midfoot appears intact.    The hindfoot demonstrates a small plantar calcaneal spur.    There is no soft tissue swelling.    IMPRESSION:   Erosion at the top of the second toe may indicate osteomyelitis. Further evaluation with MR imaging is recommended  Hallux valgus and degenerative change first MTP joint.                     TO BECKFORD M.D.,ATTENDING RADIOLOGIST  This document has been electronically signed. Dec 19 2019  2:36PM    ---------------------------------------------------------------    < from: MR Foot No Cont, Left (12.26.19 @ 15:52) >     EXAM:  MR FOOT LT                          PROCEDURE DATE:  12/26/2019          INTERPRETATION:  PROCEDURE INFORMATION:   Exam: MR Left Lower Extremity Other Than Joint Without Contrast; Foot   Exam date and time: 12/26/2019 3:10 PM   Age: 86 years old   Clinical indication: Pain; Foot; Left. Evaluate for toe osteomyelitis.    TECHNIQUE:   Imaging protocol: MR of the Left lower extremity without contrast. Exam focused   on the foot.     COMPARISON:   DX XR FOOT LEFT 12/19/2019 2:04 PM     FINDINGS:     LIGAMENTS:   Lisfranc ligament: Unremarkable. No evidence of tear.       Bones/joints: T1 signal hypointensity with corresponding signal prolongation on the inversion recovery involving second middle and distal phalanges concerning for acuteosteomyelitis. No additional foci of abnormal marrow signal. No acute fracture. Hallux valgus. Degenerative changes in the first   metatarsophalangeal joint and midfoot.   Soft tissues: Severe cellulitis involving second toe and extending along the   dorsum of the foot.     IMPRESSION:   Acute osteomyelitis involving the middle and distal phalanges of the second toe.                RASHEED VINSON M.D., ATTENDING RADIOLOGIST  This document has been electronically signed. Dec 27 2019 10:34AM        < end of copied text >

## 2019-12-27 NOTE — PROGRESS NOTE ADULT - REASON FOR ADMISSION
Left Second toe infection

## 2019-12-27 NOTE — DISCHARGE NOTE PROVIDER - PROVIDER TOKENS
PROVIDER:[TOKEN:[[id number]]],PROVIDER:[TOKEN:[[id number]]],PROVIDER:[TOKEN:[[id number]]]

## 2019-12-27 NOTE — PROCEDURE NOTE - NSPROCDETAILS_GEN_ALL_CORE
ultrasound guidance/location identified, draped/prepped, sterile technique used/sterile dressing applied/sterile technique, catheter placed/supine position/ultrasound assessment

## 2019-12-27 NOTE — PROGRESS NOTE ADULT - ATTENDING COMMENTS
Estimated discharge date 12/26/19
Estimated discharge date undetermined
Estimated discharge date undetermined.
Estimated discharge date undetermined.
Estimated discharge date undetermined

## 2019-12-27 NOTE — DISCHARGE NOTE PROVIDER - CARE PROVIDER_API CALL
Caleb Carbone)  Internal Medicine  [address] [place] [address]  Phone: [phone]  Fax: [phone]  Follow Up Time:     Arsenio Zamarripa)  Cardiovascular Disease; Internal Medicine  [address] [place] [address]  Phone: [phone]  Fax: [phone]  Follow Up Time:     Tyler Mae)  Podiatric Medicine and Surgery  [address] [place] [address]  Phone: [phone]  Fax: [phone]  Follow Up Time:

## 2019-12-27 NOTE — DISCHARGE NOTE NURSING/CASE MANAGEMENT/SOCIAL WORK - PATIENT PORTAL LINK FT
You can access the FollowMyHealth Patient Portal offered by [facility] by registering at the following website: [email or web address]. By joining [facility]’s FollowMyHealth portal, you will also be able to view your health information using other applications (apps) compatible with our system.

## 2019-12-27 NOTE — PROCEDURE NOTE - NSICDXPROCEDURE_GEN_ALL_CORE_FT
PROCEDURES:  Midline catheter insertion 27-Dec-2019 14:39:57 4FR 14CM  29CIRC BARD POWER MIDLINE ns flush good heme back left basilic vein Dc Mercado

## 2020-01-03 ENCOUNTER — INPATIENT (INPATIENT)
Facility: HOSPITAL | Age: 85
LOS: 6 days | DRG: 283 | End: 2020-01-10
Admitting: HOSPITALIST
Payer: MEDICARE

## 2020-01-03 VITALS
WEIGHT: 119.93 LBS | HEIGHT: 64 IN | HEART RATE: 135 BPM | DIASTOLIC BLOOD PRESSURE: 83 MMHG | SYSTOLIC BLOOD PRESSURE: 129 MMHG | TEMPERATURE: 98 F | OXYGEN SATURATION: 98 % | RESPIRATION RATE: 16 BRPM

## 2020-01-03 DIAGNOSIS — I48.91 UNSPECIFIED ATRIAL FIBRILLATION: ICD-10-CM

## 2020-01-03 LAB
ALBUMIN SERPL ELPH-MCNC: 3.7 G/DL — SIGNIFICANT CHANGE UP (ref 3.3–5.2)
ALP SERPL-CCNC: 59 U/L — SIGNIFICANT CHANGE UP (ref 40–120)
ALT FLD-CCNC: 19 U/L — SIGNIFICANT CHANGE UP
ANION GAP SERPL CALC-SCNC: 16 MMOL/L — SIGNIFICANT CHANGE UP (ref 5–17)
ANISOCYTOSIS BLD QL: SLIGHT — SIGNIFICANT CHANGE UP
APTT BLD: 31.3 SEC — SIGNIFICANT CHANGE UP (ref 27.5–36.3)
AST SERPL-CCNC: 29 U/L — SIGNIFICANT CHANGE UP
BASOPHILS # BLD AUTO: 0 K/UL — SIGNIFICANT CHANGE UP (ref 0–0.2)
BASOPHILS NFR BLD AUTO: 0 % — SIGNIFICANT CHANGE UP (ref 0–2)
BILIRUB SERPL-MCNC: 0.5 MG/DL — SIGNIFICANT CHANGE UP (ref 0.4–2)
BUN SERPL-MCNC: 37 MG/DL — HIGH (ref 8–20)
CALCIUM SERPL-MCNC: 9.4 MG/DL — SIGNIFICANT CHANGE UP (ref 8.6–10.2)
CHLORIDE SERPL-SCNC: 100 MMOL/L — SIGNIFICANT CHANGE UP (ref 98–107)
CO2 SERPL-SCNC: 21 MMOL/L — LOW (ref 22–29)
CREAT SERPL-MCNC: 1.28 MG/DL — SIGNIFICANT CHANGE UP (ref 0.5–1.3)
ELLIPTOCYTES BLD QL SMEAR: SLIGHT — SIGNIFICANT CHANGE UP
EOSINOPHIL # BLD AUTO: 0 K/UL — SIGNIFICANT CHANGE UP (ref 0–0.5)
EOSINOPHIL NFR BLD AUTO: 0 % — SIGNIFICANT CHANGE UP (ref 0–6)
GLUCOSE BLDC GLUCOMTR-MCNC: 143 MG/DL — HIGH (ref 70–99)
GLUCOSE SERPL-MCNC: 180 MG/DL — HIGH (ref 70–115)
HCT VFR BLD CALC: 38.7 % — SIGNIFICANT CHANGE UP (ref 34.5–45)
HGB BLD-MCNC: 12.3 G/DL — SIGNIFICANT CHANGE UP (ref 11.5–15.5)
INR BLD: 1.19 RATIO — HIGH (ref 0.88–1.16)
LYMPHOCYTES # BLD AUTO: 0.21 K/UL — LOW (ref 1–3.3)
LYMPHOCYTES # BLD AUTO: 2.6 % — LOW (ref 13–44)
MACROCYTES BLD QL: SLIGHT — SIGNIFICANT CHANGE UP
MANUAL SMEAR VERIFICATION: SIGNIFICANT CHANGE UP
MCHC RBC-ENTMCNC: 31.2 PG — SIGNIFICANT CHANGE UP (ref 27–34)
MCHC RBC-ENTMCNC: 31.8 GM/DL — LOW (ref 32–36)
MCV RBC AUTO: 98.2 FL — SIGNIFICANT CHANGE UP (ref 80–100)
MONOCYTES # BLD AUTO: 0.15 K/UL — SIGNIFICANT CHANGE UP (ref 0–0.9)
MONOCYTES NFR BLD AUTO: 1.8 % — LOW (ref 2–14)
NEUTROPHILS # BLD AUTO: 7.73 K/UL — HIGH (ref 1.8–7.4)
NEUTROPHILS NFR BLD AUTO: 93.9 % — HIGH (ref 43–77)
NEUTS BAND # BLD: 1.7 % — SIGNIFICANT CHANGE UP (ref 0–8)
NRBC # BLD: 1 /100 — HIGH (ref 0–0)
PLAT MORPH BLD: NORMAL — SIGNIFICANT CHANGE UP
PLATELET # BLD AUTO: 181 K/UL — SIGNIFICANT CHANGE UP (ref 150–400)
POTASSIUM SERPL-MCNC: 5.6 MMOL/L — HIGH (ref 3.5–5.3)
POTASSIUM SERPL-SCNC: 5.6 MMOL/L — HIGH (ref 3.5–5.3)
PROT SERPL-MCNC: 6.1 G/DL — LOW (ref 6.6–8.7)
PROTHROM AB SERPL-ACNC: 13.8 SEC — HIGH (ref 10–12.9)
RBC # BLD: 3.94 M/UL — SIGNIFICANT CHANGE UP (ref 3.8–5.2)
RBC # FLD: 14.8 % — HIGH (ref 10.3–14.5)
RBC BLD AUTO: ABNORMAL
SODIUM SERPL-SCNC: 137 MMOL/L — SIGNIFICANT CHANGE UP (ref 135–145)
TROPONIN T SERPL-MCNC: 0.09 NG/ML — HIGH (ref 0–0.06)
WBC # BLD: 8.09 K/UL — SIGNIFICANT CHANGE UP (ref 3.8–10.5)
WBC # FLD AUTO: 8.09 K/UL — SIGNIFICANT CHANGE UP (ref 3.8–10.5)

## 2020-01-03 PROCEDURE — 99291 CRITICAL CARE FIRST HOUR: CPT

## 2020-01-03 PROCEDURE — 99223 1ST HOSP IP/OBS HIGH 75: CPT

## 2020-01-03 PROCEDURE — 71045 X-RAY EXAM CHEST 1 VIEW: CPT | Mod: 26

## 2020-01-03 PROCEDURE — 93010 ELECTROCARDIOGRAM REPORT: CPT

## 2020-01-03 RX ORDER — DEXTROSE 50 % IN WATER 50 %
12.5 SYRINGE (ML) INTRAVENOUS ONCE
Refills: 0 | Status: DISCONTINUED | OUTPATIENT
Start: 2020-01-03 | End: 2020-01-10

## 2020-01-03 RX ORDER — SODIUM CHLORIDE 9 MG/ML
1000 INJECTION, SOLUTION INTRAVENOUS
Refills: 0 | Status: DISCONTINUED | OUTPATIENT
Start: 2020-01-03 | End: 2020-01-10

## 2020-01-03 RX ORDER — ASPIRIN/CALCIUM CARB/MAGNESIUM 324 MG
325 TABLET ORAL ONCE
Refills: 0 | Status: COMPLETED | OUTPATIENT
Start: 2020-01-03 | End: 2020-01-03

## 2020-01-03 RX ORDER — CARVEDILOL PHOSPHATE 80 MG/1
12.5 CAPSULE, EXTENDED RELEASE ORAL EVERY 12 HOURS
Refills: 0 | Status: DISCONTINUED | OUTPATIENT
Start: 2020-01-03 | End: 2020-01-04

## 2020-01-03 RX ORDER — DEXTROSE 50 % IN WATER 50 %
25 SYRINGE (ML) INTRAVENOUS ONCE
Refills: 0 | Status: DISCONTINUED | OUTPATIENT
Start: 2020-01-03 | End: 2020-01-10

## 2020-01-03 RX ORDER — LINEZOLID 600 MG/300ML
600 INJECTION, SOLUTION INTRAVENOUS EVERY 12 HOURS
Refills: 0 | Status: DISCONTINUED | OUTPATIENT
Start: 2020-01-03 | End: 2020-01-10

## 2020-01-03 RX ORDER — SODIUM CHLORIDE 9 MG/ML
500 INJECTION INTRAMUSCULAR; INTRAVENOUS; SUBCUTANEOUS ONCE
Refills: 0 | Status: COMPLETED | OUTPATIENT
Start: 2020-01-03 | End: 2020-01-03

## 2020-01-03 RX ORDER — DILTIAZEM HCL 120 MG
10 CAPSULE, EXT RELEASE 24 HR ORAL ONCE
Refills: 0 | Status: COMPLETED | OUTPATIENT
Start: 2020-01-03 | End: 2020-01-03

## 2020-01-03 RX ORDER — INSULIN LISPRO 100/ML
VIAL (ML) SUBCUTANEOUS
Refills: 0 | Status: DISCONTINUED | OUTPATIENT
Start: 2020-01-03 | End: 2020-01-10

## 2020-01-03 RX ORDER — DEXTROSE 50 % IN WATER 50 %
15 SYRINGE (ML) INTRAVENOUS ONCE
Refills: 0 | Status: DISCONTINUED | OUTPATIENT
Start: 2020-01-03 | End: 2020-01-10

## 2020-01-03 RX ORDER — GLUCAGON INJECTION, SOLUTION 0.5 MG/.1ML
1 INJECTION, SOLUTION SUBCUTANEOUS ONCE
Refills: 0 | Status: DISCONTINUED | OUTPATIENT
Start: 2020-01-03 | End: 2020-01-10

## 2020-01-03 RX ORDER — ENOXAPARIN SODIUM 100 MG/ML
60 INJECTION SUBCUTANEOUS DAILY
Refills: 0 | Status: DISCONTINUED | OUTPATIENT
Start: 2020-01-03 | End: 2020-01-04

## 2020-01-03 RX ORDER — ERTAPENEM SODIUM 1 G/1
1000 INJECTION, POWDER, LYOPHILIZED, FOR SOLUTION INTRAMUSCULAR; INTRAVENOUS EVERY 24 HOURS
Refills: 0 | Status: DISCONTINUED | OUTPATIENT
Start: 2020-01-03 | End: 2020-01-07

## 2020-01-03 RX ORDER — DILTIAZEM HCL 120 MG
30 CAPSULE, EXT RELEASE 24 HR ORAL ONCE
Refills: 0 | Status: COMPLETED | OUTPATIENT
Start: 2020-01-03 | End: 2020-01-03

## 2020-01-03 RX ADMIN — Medication 10 MILLIGRAM(S): at 17:28

## 2020-01-03 RX ADMIN — CARVEDILOL PHOSPHATE 12.5 MILLIGRAM(S): 80 CAPSULE, EXTENDED RELEASE ORAL at 22:33

## 2020-01-03 RX ADMIN — SODIUM CHLORIDE 250 MILLILITER(S): 9 INJECTION INTRAMUSCULAR; INTRAVENOUS; SUBCUTANEOUS at 22:31

## 2020-01-03 RX ADMIN — ERTAPENEM SODIUM 120 MILLIGRAM(S): 1 INJECTION, POWDER, LYOPHILIZED, FOR SOLUTION INTRAMUSCULAR; INTRAVENOUS at 22:33

## 2020-01-03 RX ADMIN — Medication 30 MILLIGRAM(S): at 19:44

## 2020-01-03 RX ADMIN — Medication 325 MILLIGRAM(S): at 19:43

## 2020-01-03 RX ADMIN — LINEZOLID 600 MILLIGRAM(S): 600 INJECTION, SOLUTION INTRAVENOUS at 22:33

## 2020-01-03 NOTE — H&P ADULT - HISTORY OF PRESENT ILLNESS
86F hx HTN, DM, HFrEF recent admit for diabetic foot ulcer/osteo presents with worsening fatigue, STINSON and palpitations. Patient with recently here for foot ulcer and sent home on IV ertapenem and linezolid via midline. For last few days son noticed pt had lack of appetite. Finally admitted to having worsening STINSON and palpitations for last couple of weeks. No chest pain, fevers, chills, cough, orthopnea, dizziness or syncope. No known history of afib. She called PCP Dr. Carbone who recommended to come to hospital.    In ED, pt found to be afib w/ RVR of 135 with bp 129/83, RR- 16, Spo2 98 on RA. Pt given cardizem 10 iv and cardizem 30 PO with good response in heart rate.

## 2020-01-03 NOTE — H&P ADULT - ASSESSMENT
86F hx HTN, DM, HFrEF recent admit for diabetic foot ulcer/osteo presents with worsening fatigue, STINSON and palpitations found to have new afib w/ RVR. 86F hx HTN, DM, HFrEF recent admit for diabetic foot ulcer/osteo presents with worsening fatigue, STINSON and palpitations found to have new afib w/ RVR.     Unable to complete med rec please clarify in am.

## 2020-01-03 NOTE — ED PROVIDER NOTE - OBJECTIVE STATEMENT
86 year old female with PMH HTN, DM, and recent admisison for infected diabetic foot ulcer/osteo presents with nausea and lightheadedness. pt reports 1 week of nausea and decrease PO intake and then states that today she started to feel lightheaded. She denies abd pain, chest pain, SOB, fever, chills.

## 2020-01-03 NOTE — H&P ADULT - PROBLEM SELECTOR PLAN 4
pt on iv Invanc and linezolid at home  will consult ID for follow up  please consult podiatry in am.

## 2020-01-03 NOTE — ED ADULT TRIAGE NOTE - CHIEF COMPLAINT QUOTE
comes to ed for generalized weakness and confusion reported by son. patient was found by son to be confused at 11am was weak yesterday; unknown last well. patient has left upper arm picc for osteomyelitis; recently discharged. tachycardiac. direct to crit for ekg/rectal temp.

## 2020-01-03 NOTE — CONSULT NOTE ADULT - SUBJECTIVE AND OBJECTIVE BOX
REASON FOR Tele-evaluation    SUBJECTIVE: "generalized weakness and confusion reported by son. patient was found by son to be confused at 11am was weak yesterday; unknown last well."     HPI:  86 year old female with PMH HTN, DM, and recent admisison for infected diabetic foot ulcer/osteo presents with nausea and lightheadedness. pt reports 1 week of nausea and decrease PO intake and then states that today she started to feel lightheaded. She denies abd pain, chest pain, SOB, fever, chills.      OBJECTIVE  ROS:  all other ROS negative except as documented above.     PHYSICAL EXAM: Tele-evaluation precludes physical exam.   Vital Signs Last 24 Hrs  T(C): 36.8 (03 Jan 2020 17:09), Max: 36.8 (03 Jan 2020 17:09)  T(F): 98.3 (03 Jan 2020 17:09), Max: 98.3 (03 Jan 2020 17:09)  HR: 122 (03 Jan 2020 19:46) (76 - 135)  BP: 129/83 (03 Jan 2020 16:51) (129/83 - 129/83)  RR: 16 (03 Jan 2020 16:51) (16 - 16)  SpO2: 98% (03 Jan 2020 16:51) (98% - 98%)        LABS: All Labs Reviewed:                        12.3   8.09  )-----------( 181      ( 03 Jan 2020 17:36 )             38.7     01-03    137  |  100  |  37.0<H>  ----------------------------<  180<H>  5.6<H>   |  21.0<L>  |  1.28    Ca    9.4      03 Jan 2020 17:36    TPro  6.1<L>  /  Alb  3.7  /  TBili  0.5  /  DBili  x   /  AST  29  /  ALT  19  /  AlkPhos  59  01-03    PT/INR - ( 03 Jan 2020 17:36 )   PT: 13.8 sec;   INR: 1.19 ratio    PTT - ( 03 Jan 2020 17:36 )  PTT:31.3 sec  CARDIAC MARKERS ( 03 Jan 2020 17:36 )  x     / 0.09 ng/mL / x     / x     / x      Blood Culture:     RADIOLOGY/EKG:              ASSESSMENT AND PLAN:   New onset A. Fib with RVR  admit to Tele  - Head CT r/o CVA, patient reports acute confusion with findings of a.fib for unknown duration.   - Monitor Neuro  - Bedside dysphagia.   - Cardiology consult  - ECHO completed 12/2019: EF 30-35%: Moderate-severe tricuspid regurgitation.   - CHADS2 score >2 (age, DM, PVD, HTN, female, CHF)   - Lovenox Daily 1mg/kg (due to reduced GFR)    DM T2  RISS, Accu check, Hypoglycemia protocol   A1C 6.0 dec 2019    DM foot ulcer  - pls evaluate while at bedside  - consider Podiatry consult   - left arm Midline noted on CXR: inserted 12/27/2019  - f/u ESR/ CRP / procalcitonin / Blood culture x 2: as patient as a slight up trend in wbc with left shift and bandemia. r/o bacteremia causing her loss of appetite and confusion from metabolic encephalopathy   - please consult ID for continuation of care while patient is on Ertapenem 1gm daily and Linezolid 600mg PO (started 12/27 for total of 2weeks (due to end Evelio 10) please verify with ID.     Care plan discussed with Dr. Phelps REASON FOR Tele-evaluation    SUBJECTIVE: "generalized weakness and confusion reported by son. patient was found by son to be confused at 11am was weak yesterday; unknown last well."     ED HPI: 86 year old female with PMH HTN, DM, and recent admisison for infected diabetic foot ulcer/osteo presents with nausea and lightheadedness. pt reports 1 week of nausea and decrease PO intake and then states that today she started to feel lightheaded. She denies abd pain, chest pain, SOB, fever, chills.    Above ED HPI reviewed and noted: patient interviewed with  at bedside via video conference. patient indicates that her son became concerned as she did not eat anything yesterday or today which lead him to bring her in. She reports that food is making her nauseous, no vomiting. no abdominal pain. no diarrhea. additionally she reports SOB, STINSON and increased fatigue over the past few days. she recalls her PCP Raf telling her that her "heart is working too hard". her STINSON "sensation as if I just ran" occurs with minimal exertion, causing difficulty catching her breath, over the past few weeks.     patient is not able to provide information on her PMH or medications. During the interview patient had episodes where her response had no relations to the question asked (does anyone in your family have Diabetes, patient response "yes, the doctor put a patch on my shoulder and told me to change it everyday)      OBJECTIVE  ROS:  all other ROS negative except as documented above.     PHYSICAL EXAM: Tele-evaluation precludes physical exam.   Vital Signs Last 24 Hrs  T(C): 36.8 (03 Jan 2020 17:09), Max: 36.8 (03 Jan 2020 17:09)  T(F): 98.3 (03 Jan 2020 17:09), Max: 98.3 (03 Jan 2020 17:09)  HR: 122 (03 Jan 2020 19:46) (76 - 135)  BP: 129/83 (03 Jan 2020 16:51) (129/83 - 129/83)  RR: 16 (03 Jan 2020 16:51) (16 - 16)  SpO2: 98% (03 Jan 2020 16:51) (98% - 98%)        LABS: All Labs Reviewed:                        12.3   8.09  )-----------( 181      ( 03 Jan 2020 17:36 )             38.7     01-03    137  |  100  |  37.0<H>  ----------------------------<  180<H>  5.6<H>   |  21.0<L>  |  1.28    Ca    9.4      03 Jan 2020 17:36    TPro  6.1<L>  /  Alb  3.7  /  TBili  0.5  /  DBili  x   /  AST  29  /  ALT  19  /  AlkPhos  59  01-03    PT/INR - ( 03 Jan 2020 17:36 )   PT: 13.8 sec;   INR: 1.19 ratio    PTT - ( 03 Jan 2020 17:36 )  PTT:31.3 sec  CARDIAC MARKERS ( 03 Jan 2020 17:36 )  x     / 0.09 ng/mL / x     / x     / x      Blood Culture:     RADIOLOGY/EKG:    CT head pending  CXR noted          ASSESSMENT AND PLAN:   New onset A. Fib with RVR; with known HFrEF  admit to Tele  - Head CT r/o CVA, patient reports acute confusion with findings of a.fib for unknown duration.   - Monitor Neuro  - Bedside dysphagia.   - Cardiology consult  - ECHO completed 12/2019: EF 30-35%: Moderate-severe tricuspid regurgitation.   - CHADS2 score >2 (age, DM, PVD, HTN, female, CHF)   - Lovenox Daily 1mg/kg (due to reduced GFR)  - Carvedilol 12.5mg BID resumed based on HIE home medications.   - Attempted to contact patient family was unsuccessful. please re-attempt. medication reconciliation incomplete.     DM T2  RISS, Accu check, Hypoglycemia protocol   A1C 6.0 dec 2019  ADA diet     DM foot ulcer  - pls evaluate while at bedside  - consider Podiatry consult   - left arm Midline noted on CXR: inserted 12/27/2019  - f/u ESR/ CRP / procalcitonin / Blood culture x 2: as patient as a slight up trend in wbc with left shift and bandemia. r/o bacteremia causing her loss of appetite and confusion from metabolic encephalopathy   - please consult ID for continuation of care while patient is on Ertapenem 1gm daily and Linezolid 600mg PO (started 12/27 for total of 2weeks (due to end Evelio 10) please verify with ID.     HTN, stable  - held Lisinopril due to reduced GRF  - RASHAAD - dehydration suspected, pls evaluate   - normal saline 500mg slow bolus given     OOB with assistance  Fall / aspiration precautions     Care plan discussed with Dr. Phelps

## 2020-01-03 NOTE — H&P ADULT - NSHPLABSRESULTS_GEN_ALL_CORE
11.1   6.65  )-----------( 136      ( 04 Jan 2020 01:37 )             33.8       01-04    136  |  103  |  39.0<H>  ----------------------------<  162<H>  5.0   |  21.0<L>  |  1.19    Ca    8.9      04 Jan 2020 01:37  Phos  3.5     01-04  Mg     2.2     01-04    TPro  6.1<L>  /  Alb  3.7  /  TBili  0.5  /  DBili  x   /  AST  29  /  ALT  19  /  AlkPhos  59  01-03                  PT/INR - ( 04 Jan 2020 02:23 )   PT: 14.0 sec;   INR: 1.21 ratio         PTT - ( 03 Jan 2020 17:36 )  PTT:31.3 sec    Lactate Trend      CARDIAC MARKERS ( 04 Jan 2020 01:37 )  x     / 0.10 ng/mL / x     / x     / x      CARDIAC MARKERS ( 03 Jan 2020 17:36 )  x     / 0.09 ng/mL / x     / x     / x            CAPILLARY BLOOD GLUCOSE      POCT Blood Glucose.: 143 mg/dL (03 Jan 2020 22:30)        Culture Results:   No growth (12-19 @ 18:46)  Culture Results:   No growth at 5 days. (12-19 @ 16:27)  Culture Results:   No growth at 5 days. (12-19 @ 15:25)      RADIOLOGY, EKG & ADDITIONAL TESTS: Reviewed.     < from: CT Head No Cont (01.04.20 @ 03:48) >    FINDINGS:   Brain: Stable partially calcified lesion along the right tentorium, likely   meningioma. Finding can be further evaluated with outpatient MRI if not   previously performed. Nonspecific white matter heterogeneity commonly seen with   age-indeterminate small vessel ischemic change. No CT evidence of acute   territorial infarct (correlate with MRI if indicated). No intracranial   hemorrhage.   Ventricles: No hydrocephalus.   Bones/joints: No acute findings.   Sinuses: No acute findings.   Mastoid air cells: No acute findings.   Soft tissues: No acute findings.     IMPRESSION:   No intracranial hemorrhage. No CT findings of acute territorial infarct.  Additional findings as above.    < end of copied text >

## 2020-01-03 NOTE — H&P ADULT - NSHPPHYSICALEXAM_GEN_ALL_CORE
Vital Signs Last 24 Hrs  T(C): 36.3 (04 Jan 2020 04:29), Max: 36.8 (03 Jan 2020 17:09)  T(F): 97.4 (04 Jan 2020 04:29), Max: 98.3 (03 Jan 2020 17:09)  HR: 94 (04 Jan 2020 04:29) (75 - 135)  BP: 121/56 (04 Jan 2020 04:29) (121/56 - 140/65)  BP(mean): --  RR: 16 (04 Jan 2020 04:29) (16 - 18)  SpO2: 98% (04 Jan 2020 04:29) (95% - 100%)    GENERAL: NAD appears stated age  HEENT:  Atraumatic, Normocephalic, MMM, no oropharyngeal lesions  EYES: EOMI, PERRLA, conjunctiva and sclera clear  NECK: Supple, No JVD, no throat tenderness.  CHEST/LUNG: Clear to auscultation bilaterally; No wheezes, rales, or rhonchi  HEART: irregularly irregular rhythm, no murmurs  ABDOMEN: Soft, Nontender, Nondistended; Bowel sounds present  EXTREMITIES:  2+ Peripheral Pulses, No clubbing, cyanosis, or edema  PSYCH: AAOx3, normal affect  NEUROLOGY: moves all extremities spontaneously. no sensory deficits  SKIN:+L foot toe ulcer

## 2020-01-03 NOTE — ED PROVIDER NOTE - CLINICAL SUMMARY MEDICAL DECISION MAKING FREE TEXT BOX
pt with new onset rapi afib, lateral st depression indicated of demand ischemia, currently rate controlled in 80s/90s and no chets pain

## 2020-01-03 NOTE — ED ADULT NURSE NOTE - OBJECTIVE STATEMENT
Patient A&Ox4, denies any pain or discomfort. Stated son brought her to the hospital because she has not been eating. Has PICC to left upper extremity, pink band in place. Cardiac monitor in place, paced. BLLE edema, tender to touch. Has closed blister to right anterior top of foot. Has diabetic ulcer to left foot second toe. Assessment & information obtained with  Deepa Giles.

## 2020-01-03 NOTE — H&P ADULT - PROBLEM SELECTOR PLAN 2
pt with elevated troponin, slight lateral st depression  likely 2/2 demand ischemia  pt already on ac  will start pt on daily baby aspirin

## 2020-01-03 NOTE — H&P ADULT - PROBLEM SELECTOR PLAN 1
new afib  given elevated armida vasc will start on full dose lovenox  c/w carvedilol 12.5 bid, titrate as needed  will consult cardiology ([place])

## 2020-01-04 DIAGNOSIS — I50.22 CHRONIC SYSTOLIC (CONGESTIVE) HEART FAILURE: ICD-10-CM

## 2020-01-04 DIAGNOSIS — E11.621 TYPE 2 DIABETES MELLITUS WITH FOOT ULCER: ICD-10-CM

## 2020-01-04 DIAGNOSIS — R79.89 OTHER SPECIFIED ABNORMAL FINDINGS OF BLOOD CHEMISTRY: ICD-10-CM

## 2020-01-04 DIAGNOSIS — L97.509 NON-PRESSURE CHRONIC ULCER OF OTHER PART OF UNSPECIFIED FOOT WITH UNSPECIFIED SEVERITY: ICD-10-CM

## 2020-01-04 DIAGNOSIS — I48.91 UNSPECIFIED ATRIAL FIBRILLATION: ICD-10-CM

## 2020-01-04 PROBLEM — Z95.0 PRESENCE OF CARDIAC PACEMAKER: Chronic | Status: ACTIVE | Noted: 2019-12-19

## 2020-01-04 LAB
ANION GAP SERPL CALC-SCNC: 12 MMOL/L — SIGNIFICANT CHANGE UP (ref 5–17)
BUN SERPL-MCNC: 39 MG/DL — HIGH (ref 8–20)
CALCIUM SERPL-MCNC: 8.9 MG/DL — SIGNIFICANT CHANGE UP (ref 8.6–10.2)
CHLORIDE SERPL-SCNC: 103 MMOL/L — SIGNIFICANT CHANGE UP (ref 98–107)
CO2 SERPL-SCNC: 21 MMOL/L — LOW (ref 22–29)
CREAT SERPL-MCNC: 1.19 MG/DL — SIGNIFICANT CHANGE UP (ref 0.5–1.3)
CRP SERPL-MCNC: 1.1 MG/DL — HIGH (ref 0–0.4)
ERYTHROCYTE [SEDIMENTATION RATE] IN BLOOD: 8 MM/HR — SIGNIFICANT CHANGE UP (ref 0–20)
GLUCOSE BLDC GLUCOMTR-MCNC: 150 MG/DL — HIGH (ref 70–99)
GLUCOSE BLDC GLUCOMTR-MCNC: 156 MG/DL — HIGH (ref 70–99)
GLUCOSE BLDC GLUCOMTR-MCNC: 161 MG/DL — HIGH (ref 70–99)
GLUCOSE BLDC GLUCOMTR-MCNC: 165 MG/DL — HIGH (ref 70–99)
GLUCOSE SERPL-MCNC: 162 MG/DL — HIGH (ref 70–115)
HCT VFR BLD CALC: 33.8 % — LOW (ref 34.5–45)
HGB BLD-MCNC: 11.1 G/DL — LOW (ref 11.5–15.5)
INR BLD: 1.21 RATIO — HIGH (ref 0.88–1.16)
MAGNESIUM SERPL-MCNC: 2.2 MG/DL — SIGNIFICANT CHANGE UP (ref 1.6–2.6)
MCHC RBC-ENTMCNC: 32.3 PG — SIGNIFICANT CHANGE UP (ref 27–34)
MCHC RBC-ENTMCNC: 32.8 GM/DL — SIGNIFICANT CHANGE UP (ref 32–36)
MCV RBC AUTO: 98.3 FL — SIGNIFICANT CHANGE UP (ref 80–100)
PHOSPHATE SERPL-MCNC: 3.5 MG/DL — SIGNIFICANT CHANGE UP (ref 2.4–4.7)
PLATELET # BLD AUTO: 136 K/UL — LOW (ref 150–400)
POTASSIUM SERPL-MCNC: 5 MMOL/L — SIGNIFICANT CHANGE UP (ref 3.5–5.3)
POTASSIUM SERPL-SCNC: 5 MMOL/L — SIGNIFICANT CHANGE UP (ref 3.5–5.3)
PROCALCITONIN SERPL-MCNC: 0.03 NG/ML — SIGNIFICANT CHANGE UP (ref 0.02–0.1)
PROTHROM AB SERPL-ACNC: 14 SEC — HIGH (ref 10–12.9)
RBC # BLD: 3.44 M/UL — LOW (ref 3.8–5.2)
RBC # FLD: 14.9 % — HIGH (ref 10.3–14.5)
SODIUM SERPL-SCNC: 136 MMOL/L — SIGNIFICANT CHANGE UP (ref 135–145)
TROPONIN T SERPL-MCNC: 0.1 NG/ML — HIGH (ref 0–0.06)
WBC # BLD: 6.65 K/UL — SIGNIFICANT CHANGE UP (ref 3.8–10.5)
WBC # FLD AUTO: 6.65 K/UL — SIGNIFICANT CHANGE UP (ref 3.8–10.5)

## 2020-01-04 PROCEDURE — 99222 1ST HOSP IP/OBS MODERATE 55: CPT

## 2020-01-04 PROCEDURE — 70450 CT HEAD/BRAIN W/O DYE: CPT | Mod: 26

## 2020-01-04 PROCEDURE — 99233 SBSQ HOSP IP/OBS HIGH 50: CPT

## 2020-01-04 RX ORDER — CARVEDILOL PHOSPHATE 80 MG/1
25 CAPSULE, EXTENDED RELEASE ORAL EVERY 12 HOURS
Refills: 0 | Status: DISCONTINUED | OUTPATIENT
Start: 2020-01-04 | End: 2020-01-10

## 2020-01-04 RX ORDER — LISINOPRIL 2.5 MG/1
5 TABLET ORAL DAILY
Refills: 0 | Status: DISCONTINUED | OUTPATIENT
Start: 2020-01-04 | End: 2020-01-05

## 2020-01-04 RX ORDER — DILTIAZEM HCL 120 MG
10 CAPSULE, EXT RELEASE 24 HR ORAL EVERY 4 HOURS
Refills: 0 | Status: DISCONTINUED | OUTPATIENT
Start: 2020-01-04 | End: 2020-01-10

## 2020-01-04 RX ORDER — ENOXAPARIN SODIUM 100 MG/ML
60 INJECTION SUBCUTANEOUS DAILY
Refills: 0 | Status: DISCONTINUED | OUTPATIENT
Start: 2020-01-04 | End: 2020-01-08

## 2020-01-04 RX ORDER — ASPIRIN/CALCIUM CARB/MAGNESIUM 324 MG
81 TABLET ORAL DAILY
Refills: 0 | Status: DISCONTINUED | OUTPATIENT
Start: 2020-01-04 | End: 2020-01-10

## 2020-01-04 RX ADMIN — ERTAPENEM SODIUM 120 MILLIGRAM(S): 1 INJECTION, POWDER, LYOPHILIZED, FOR SOLUTION INTRAMUSCULAR; INTRAVENOUS at 22:15

## 2020-01-04 RX ADMIN — CARVEDILOL PHOSPHATE 12.5 MILLIGRAM(S): 80 CAPSULE, EXTENDED RELEASE ORAL at 09:09

## 2020-01-04 RX ADMIN — ENOXAPARIN SODIUM 60 MILLIGRAM(S): 100 INJECTION SUBCUTANEOUS at 09:08

## 2020-01-04 RX ADMIN — Medication 1: at 11:58

## 2020-01-04 RX ADMIN — CARVEDILOL PHOSPHATE 25 MILLIGRAM(S): 80 CAPSULE, EXTENDED RELEASE ORAL at 17:12

## 2020-01-04 RX ADMIN — Medication 81 MILLIGRAM(S): at 09:08

## 2020-01-04 RX ADMIN — Medication 1: at 22:15

## 2020-01-04 RX ADMIN — LINEZOLID 600 MILLIGRAM(S): 600 INJECTION, SOLUTION INTRAVENOUS at 17:13

## 2020-01-04 RX ADMIN — LINEZOLID 600 MILLIGRAM(S): 600 INJECTION, SOLUTION INTRAVENOUS at 09:09

## 2020-01-04 RX ADMIN — Medication 1: at 07:49

## 2020-01-04 NOTE — PROGRESS NOTE ADULT - ASSESSMENT
86 yr old female with diabetes mellitus, diabetic foot ulcer with recent diagnosis of osteomyelitis on IV antibiotics admitted with complaints of fatigue and worsening shortness of breath on exertion. She was noted to be in new onset atrial fibrillation with RVR. IV Cardizem was given, full dose anticoagulation was initiated and cardiology evaluation was requested. 86 yr old female with diabetes mellitus, diabetic foot ulcer with recent diagnosis of osteomyelitis on IV antibiotics admitted with complaints of fatigue and worsening shortness of breath on exertion. She was noted to be in new onset atrial fibrillation with RVR. IV Cardizem was given, full dose anticoagulation was initiated and cardiology evaluation was requested. Cardiology advised increasing Coreg and continuing Lovenox.

## 2020-01-04 NOTE — PATIENT PROFILE ADULT - INSURANCE HELP
no Implemented All Universal Safety Interventions:  [place] to call system. Call bell, personal items and telephone within reach. Instruct patient to call for assistance. Room bathroom lighting operational. Non-slip footwear when patient is off stretcher. Physically safe environment: no spills, clutter or unnecessary equipment. Stretcher in lowest position, wheels locked, appropriate side rails in place.

## 2020-01-04 NOTE — PROGRESS NOTE ADULT - PROBLEM SELECTOR PLAN 1
Cardiology follow up noted, recent ECHO noted, continue Lovenox and increase Coreg dose t 25 mg BID.

## 2020-01-04 NOTE — PATIENT PROFILE ADULT - PSYCHOSOCIAL CONCERNS - OTHER
son is primary care taker of patient, unable to care for patient at home on his own, working full time.

## 2020-01-04 NOTE — PROGRESS NOTE ADULT - SUBJECTIVE AND OBJECTIVE BOX
INTERVAL HPI/OVERNIGHT EVENTS:    CC:        Vital Signs Last 24 Hrs  T(C): 36.3 (04 Jan 2020 04:29), Max: 36.8 (03 Jan 2020 17:09)  T(F): 97.4 (04 Jan 2020 04:29), Max: 98.3 (03 Jan 2020 17:09)  HR: 94 (04 Jan 2020 04:29) (75 - 135)  BP: 121/56 (04 Jan 2020 04:29) (121/56 - 140/65)  BP(mean): --  RR: 16 (04 Jan 2020 04:29) (16 - 18)  SpO2: 98% (04 Jan 2020 04:29) (95% - 100%)    PHYSICAL EXAM:    GENERAL:   CHEST/LUNG:   HEART:   ABDOMEN:   EXTREMITIES:      MEDICATIONS  (STANDING):  aspirin enteric coated 81 milliGRAM(s) Oral daily  carvedilol 12.5 milliGRAM(s) Oral every 12 hours  dextrose 5%. 1000 milliLiter(s) (50 mL/Hr) IV Continuous <Continuous>  dextrose 50% Injectable 12.5 Gram(s) IV Push once  dextrose 50% Injectable 25 Gram(s) IV Push once  dextrose 50% Injectable 25 Gram(s) IV Push once  enoxaparin Injectable 60 milliGRAM(s) SubCutaneous daily  ertapenem  IVPB 1000 milliGRAM(s) IV Intermittent every 24 hours  insulin lispro (HumaLOG) corrective regimen sliding scale   SubCutaneous Before meals and at bedtime  linezolid    Tablet 600 milliGRAM(s) Oral every 12 hours    MEDICATIONS  (PRN):  dextrose 40% Gel 15 Gram(s) Oral once PRN Blood Glucose LESS THAN 70 milliGRAM(s)/deciliter  glucagon  Injectable 1 milliGRAM(s) IntraMuscular once PRN Glucose LESS THAN 70 milligrams/deciliter      Allergies    No Known Allergies    Intolerances          LABS:                          11.1   6.65  )-----------( 136      ( 04 Jan 2020 01:37 )             33.8     01-04    136  |  103  |  39.0<H>  ----------------------------<  162<H>  5.0   |  21.0<L>  |  1.19    Ca    8.9      04 Jan 2020 01:37  Phos  3.5     01-04  Mg     2.2     01-04    TPro  6.1<L>  /  Alb  3.7  /  TBili  0.5  /  DBili  x   /  AST  29  /  ALT  19  /  AlkPhos  59  01-03    PT/INR - ( 04 Jan 2020 02:23 )   PT: 14.0 sec;   INR: 1.21 ratio         PTT - ( 03 Jan 2020 17:36 )  PTT:31.3 sec      RADIOLOGY & ADDITIONAL TESTS: INTERVAL HPI/OVERNIGHT EVENTS:    CC: atrial fibrillation with RVR, diabetes mellitus with osteomyelitis of left 2nd toe, systolic CHF      Chart and course reviewed. No complaints, no shortness of breath        Vital Signs Last 24 Hrs  T(C): 36.3 (04 Jan 2020 04:29), Max: 36.8 (03 Jan 2020 17:09)  T(F): 97.4 (04 Jan 2020 04:29), Max: 98.3 (03 Jan 2020 17:09)  HR: 94 (04 Jan 2020 04:29) (75 - 135)  BP: 121/56 (04 Jan 2020 04:29) (121/56 - 140/65)  BP(mean): --  RR: 16 (04 Jan 2020 04:29) (16 - 18)  SpO2: 98% (04 Jan 2020 04:29) (95% - 100%)    PHYSICAL EXAM:    GENERAL: alert, not in distress  CHEST/LUNG: b/l air entry  HEART: irregular  ABDOMEN: soft, bs+  EXTREMITIES:  no edema, tenderness    MEDICATIONS  (STANDING):  aspirin enteric coated 81 milliGRAM(s) Oral daily  carvedilol 12.5 milliGRAM(s) Oral every 12 hours  dextrose 5%. 1000 milliLiter(s) (50 mL/Hr) IV Continuous <Continuous>  dextrose 50% Injectable 12.5 Gram(s) IV Push once  dextrose 50% Injectable 25 Gram(s) IV Push once  dextrose 50% Injectable 25 Gram(s) IV Push once  enoxaparin Injectable 60 milliGRAM(s) SubCutaneous daily  ertapenem  IVPB 1000 milliGRAM(s) IV Intermittent every 24 hours  insulin lispro (HumaLOG) corrective regimen sliding scale   SubCutaneous Before meals and at bedtime  linezolid    Tablet 600 milliGRAM(s) Oral every 12 hours    MEDICATIONS  (PRN):  dextrose 40% Gel 15 Gram(s) Oral once PRN Blood Glucose LESS THAN 70 milliGRAM(s)/deciliter  glucagon  Injectable 1 milliGRAM(s) IntraMuscular once PRN Glucose LESS THAN 70 milligrams/deciliter      Allergies    No Known Allergies    Intolerances          LABS:                          11.1   6.65  )-----------( 136      ( 04 Jan 2020 01:37 )             33.8     01-04    136  |  103  |  39.0<H>  ----------------------------<  162<H>  5.0   |  21.0<L>  |  1.19    Ca    8.9      04 Jan 2020 01:37  Phos  3.5     01-04  Mg     2.2     01-04    TPro  6.1<L>  /  Alb  3.7  /  TBili  0.5  /  DBili  x   /  AST  29  /  ALT  19  /  AlkPhos  59  01-03    PT/INR - ( 04 Jan 2020 02:23 )   PT: 14.0 sec;   INR: 1.21 ratio         PTT - ( 03 Jan 2020 17:36 )  PTT:31.3 sec      RADIOLOGY & ADDITIONAL TESTS:

## 2020-01-04 NOTE — CONSULT NOTE ADULT - SUBJECTIVE AND OBJECTIVE BOX
[facility]  INFECTIOUS DISEASES AND INTERNAL MEDICINE at [place]  =======================================================  Leandro Monk MD  Diplomates American Board of Internal Medicine and Infectious Diseases  =======================================================    [id number]  BLANCA MONTERROSO     ED  [place]    CC: weakness and loss of appetite, new onset afib as per chart     HPI:  85y/o woman with PMH of HTN, DM, HFrEF that was discharged from [facility] recently after being treated for diabetic foot ulcer/osteo, was readmitted for worsening fatigue, STINSON and palpitations. She has a PICC line and went home on IV ertapenem and linezolid. Since she was not feeling well and a fib was new onset, she was admitted for more work up.   She had a good response to Cardizem. Antibiotics were continued. No pain in foot or any other complaint.     PAST MEDICAL & SURGICAL HISTORY:  Pacemaker  Type 2 diabetes mellitus with foot ulcer, unspecified whether long term insulin use  No pertinent past medical history  No significant past surgical history    Social Hx: no smoking, ETOH or drugs.     FAMILY HISTORY:  No pertinent family history in first degree relatives    Allergies  No Known Allergies    Antibiotics:  ertapenem  IVPB 1000 milliGRAM(s) IV Intermittent every 24 hours  linezolid    Tablet 600 milliGRAM(s) Oral every 12 hours     REVIEW OF SYSTEMS:  CONSTITUTIONAL:  No Fever or chills, + weakness  HEENT:  No diplopia or blurred vision.  No sore throat or runny nose.  CARDIOVASCULAR:  No chest pain or SOB.  RESPIRATORY:  No cough, shortness of breath, PND or orthopnea.  GASTROINTESTINAL:  No nausea, vomiting or diarrhea.  GENITOURINARY:  No dysuria, frequency or urgency. No Blood in urine  MUSCULOSKELETAL:  no joint aches, no muscle pain  SKIN:  No change in skin, hair or nails.  NEUROLOGIC:  No paresthesias, fasciculations, seizures or weakness.  PSYCHIATRIC:  No disorder of thought or mood.  ENDOCRINE:  No heat or cold intolerance, polyuria or polydipsia.  HEMATOLOGICAL:  No easy bruising or bleeding.     Physical Exam:  Vital Signs Last 24 Hrs  T(C): 36.4 (04 Jan 2020 11:34), Max: 37.1 (04 Jan 2020 07:31)  T(F): 97.5 (04 Jan 2020 11:34), Max: 98.8 (04 Jan 2020 07:31)  HR: 109 (04 Jan 2020 11:34) (75 - 135)  BP: 113/78 (04 Jan 2020 11:34) (113/78 - 140/65)  RR: 16 (04 Jan 2020 11:34) (16 - 18)  SpO2: 99% (04 Jan 2020 11:34) (95% - 100%)  Height (cm): 162.56 (01-03 @ 16:51)  Weight (kg): 54.229334534976656 (01-03 @ 16:51)  BMI (kg/m2): 20.6 (01-03 @ 16:51)  BSA (m2): 1.57 (01-03 @ 16:51)  GEN: NAD  HEENT: normocephalic and atraumatic. EOMI. PERRL.    NECK: Supple.  No lymphadenopathy   LUNGS: Clear to auscultation.  HEART: Regular rate and rhythm without murmur.  ABDOMEN: Soft, nontender, and nondistended.  Positive bowel sounds.    : No CVA tenderness  EXTREMITIES: Without any cyanosis, clubbing, rash, lesions or edema.  NEUROLOGIC: grossly intact.  PSYCHIATRIC: Appropriate affect .  SKIN: dry gangrene on left 2nd toe.     Labs:  01-04    136  |  103  |  39.0<H>  ----------------------------<  162<H>  5.0   |  21.0<L>  |  1.19    Ca    8.9      04 Jan 2020 01:37  Phos  3.5     01-04  Mg     2.2     01-04    TPro  6.1<L>  /  Alb  3.7  /  TBili  0.5  /  DBili  x   /  AST  29  /  ALT  19  /  AlkPhos  59  01-03                        11.1   6.65  )-----------( 136      ( 04 Jan 2020 01:37 )             33.8     PT/INR - ( 04 Jan 2020 02:23 )   PT: 14.0 sec;   INR: 1.21 ratio    PTT - ( 03 Jan 2020 17:36 )  PTT:31.3 sec    LIVER FUNCTIONS - ( 03 Jan 2020 17:36 )  Alb: 3.7 g/dL / Pro: 6.1 g/dL / ALK PHOS: 59 U/L / ALT: 19 U/L / AST: 29 U/L / GGT: x           CARDIAC MARKERS ( 04 Jan 2020 01:37 )  x     / 0.10 ng/mL / x     / x     / x      CARDIAC MARKERS ( 03 Jan 2020 17:36 )  x     / 0.09 ng/mL / x     / x     / x        RECENT CULTURES:  no new cultures     All imaging and other data have been reviewed.    Assessment and Plan:   85y/o woman with PMH of HTN, DM, HFrEF that was discharged from [facility] recently after being treated for diabetic foot ulcer/osteo, was readmitted for worsening fatigue, STINSON and palpitations. She has a PICC line and went home on IV ertapenem and linezolid. Since she was not feeling well and a fib was new onset, she was admitted for more work up.   she had toe nailed clipped about 1 month ago. There was incidental clip injury to tip of left 2nd toe.  Since the toe has become discolored, tender, swollen and she hs had difficulty ambulating or bearing weight on left foot    Left 2nd toe dry gangrene  diabetic foot infection  Diabetes mellitus type 2  Pacemaker present    - Blood cultures No Growth   - Xray and MRI OM of 2nd toe (distal and middle phalanx)   - ESR 7 and CRP <0.04  - Plan was treating with Ertapenem 1gm daily and Linezolid 600mg q12 po, total 2 weeks and then switch to po   - When ready for discharge will remove Midline and start her on oral Linezolid 600mg q12h and levaquin 500mg daily, last day would be 1/29  - No SSRI or MAOIs, no wine and cheese.   - Weekly labs CBC and BMP   - Midline in place  - Vascular consult as outpatient   - Trend WBC and Tm  - ID follow up weekly after discharge for labs     Will follow.

## 2020-01-04 NOTE — CONSULT NOTE ADULT - SUBJECTIVE AND OBJECTIVE BOX
[place] [facility] - [facility], THE HEART CENTER                                   [address] [place] [address]                                                      PHONE: [phone]                                                         FAX: [phone]  [email or web address]  ---------------------------------------------------------------------------------------------------------------------------------    Reason for Consult:    HPI:  BLANCA MONTERROSO is an 86y Female with hx pacemaker, recent admission for foot osteomyelitis, remains on abx via PICC, EF 30-35% during that admission, seems to have poor insight into medical hx.  Currently states only problem is dizziness.  Denies palpitations, CP, SOB although per electronic med record initial complaints were anorexia, STINSON and palpitations x few weeks.  Comfortable, sleeping, arousable at time of my eval    PAST MEDICAL & SURGICAL HISTORY:  Pacemaker  Type 2 diabetes mellitus with foot ulcer, unspecified whether long term insulin use  No pertinent past medical history  No significant past surgical history      No Known Allergies      MEDICATIONS  (STANDING):  aspirin enteric coated 81 milliGRAM(s) Oral daily  carvedilol 12.5 milliGRAM(s) Oral every 12 hours  dextrose 5%. 1000 milliLiter(s) (50 mL/Hr) IV Continuous <Continuous>  dextrose 50% Injectable 12.5 Gram(s) IV Push once  dextrose 50% Injectable 25 Gram(s) IV Push once  dextrose 50% Injectable 25 Gram(s) IV Push once  enoxaparin Injectable 60 milliGRAM(s) SubCutaneous daily  ertapenem  IVPB 1000 milliGRAM(s) IV Intermittent every 24 hours  insulin lispro (HumaLOG) corrective regimen sliding scale   SubCutaneous Before meals and at bedtime  linezolid    Tablet 600 milliGRAM(s) Oral every 12 hours    MEDICATIONS  (PRN):  dextrose 40% Gel 15 Gram(s) Oral once PRN Blood Glucose LESS THAN 70 milliGRAM(s)/deciliter  glucagon  Injectable 1 milliGRAM(s) IntraMuscular once PRN Glucose LESS THAN 70 milligrams/deciliter      Social History:  Cigarettes: no                   Alchohol:    no             Illicit Drug Abuse:  no    ROS: Negative other than as mentioned in HPI.    Vital Signs Last 24 Hrs  T(C): 36.4 (04 Jan 2020 11:34), Max: 37.1 (04 Jan 2020 07:31)  T(F): 97.5 (04 Jan 2020 11:34), Max: 98.8 (04 Jan 2020 07:31)  HR: 109 (04 Jan 2020 11:34) (75 - 135)  BP: 113/78 (04 Jan 2020 11:34) (113/78 - 140/65)  BP(mean): --  RR: 16 (04 Jan 2020 11:34) (16 - 18)  SpO2: 99% (04 Jan 2020 11:34) (95% - 100%)  ICU Vital Signs Last 24 Hrs  BLANCA RUBIOINOS  I&O's Detail    I&O's Summary    Drug Dosing Weight  BLANCA MONTERROSO      PHYSICAL EXAM:  General: Appears well developed, well nourished alert and cooperative.  HEENT: Head; normocephalic, atraumatic.  Eyes: Pupils reactive, cornea wnl.  Neck: Supple, no nodes adenopathy, no NVD or carotid bruit or thyromegaly.  CARDIOVASCULAR: tachy, regular, Normal S1 and S2, No murmur, rub, gallop or lift.   LUNGS: No rales, rhonchi or wheeze. Normal breath sounds bilaterally.  ABDOMEN: Soft, nontender without mass or organomegaly. bowel sounds normoactive.  EXTREMITIES: No clubbing, cyanosis or edema. Distal pulses wnl.   SKIN: warm and dry with normal turgor.  NEURO: Alert/oriented x 3/normal motor exam. No pathologic reflexes.    PSYCH: normal affect.        LABS:                        11.1   6.65  )-----------( 136      ( 04 Jan 2020 01:37 )             33.8     01-04    136  |  103  |  39.0<H>  ----------------------------<  162<H>  5.0   |  21.0<L>  |  1.19    Ca    8.9      04 Jan 2020 01:37  Phos  3.5     01-04  Mg     2.2     01-04    TPro  6.1<L>  /  Alb  3.7  /  TBili  0.5  /  DBili  x   /  AST  29  /  ALT  19  /  AlkPhos  59  01-03    BLANCA MONTERROSO  CARDIAC MARKERS ( 04 Jan 2020 01:37 )  x     / 0.10 ng/mL / x     / x     / x      CARDIAC MARKERS ( 03 Jan 2020 17:36 )  x     / 0.09 ng/mL / x     / x     / x          PT/INR - ( 04 Jan 2020 02:23 )   PT: 14.0 sec;   INR: 1.21 ratio         PTT - ( 03 Jan 2020 17:36 )  PTT:31.3 sec      RADIOLOGY & ADDITIONAL STUDIES:    INTERPRETATION OF TELEMETRY (personally reviewed):    ECG: atrial flutter vs sinus tachy 125-130/min (poor quality)    ECHO:last admission EF 30-35%, mild MS, mild AS, severe TR, marked biatrial enlargement        Assessment and Plan:  In summary, BLANCA MONTERROSO is an 86y Female with past medical history significant for pacemaker, chronic systolic CHF and valvular disease as described above, DM2, osteomyelitis, on abx, admitted with dizziness, per report SOB and palps as well, noted tachycardic- suspect atrial flutter/atrial tachy.  Seems euvolemic.  Recommend increase coreg to 25mg bid.  Start ACE-I given cardiomyopathy.  IV cardizem as needed for HR.  Obtain office records from [facility] (if they exist).  Tele monitor, repeat/serial ECG.  Started lovenox for AC, eventual oral AC.  May ultimately benefit from MIRTHA/CV to sinus rhythm.  WIll follow

## 2020-01-05 DIAGNOSIS — N17.9 ACUTE KIDNEY FAILURE, UNSPECIFIED: ICD-10-CM

## 2020-01-05 LAB
ANION GAP SERPL CALC-SCNC: 14 MMOL/L — SIGNIFICANT CHANGE UP (ref 5–17)
BUN SERPL-MCNC: 59 MG/DL — HIGH (ref 8–20)
CALCIUM SERPL-MCNC: 8.6 MG/DL — SIGNIFICANT CHANGE UP (ref 8.6–10.2)
CHLORIDE SERPL-SCNC: 100 MMOL/L — SIGNIFICANT CHANGE UP (ref 98–107)
CO2 SERPL-SCNC: 18 MMOL/L — LOW (ref 22–29)
CREAT SERPL-MCNC: 1.32 MG/DL — HIGH (ref 0.5–1.3)
GLUCOSE BLDC GLUCOMTR-MCNC: 156 MG/DL — HIGH (ref 70–99)
GLUCOSE BLDC GLUCOMTR-MCNC: 161 MG/DL — HIGH (ref 70–99)
GLUCOSE BLDC GLUCOMTR-MCNC: 165 MG/DL — HIGH (ref 70–99)
GLUCOSE BLDC GLUCOMTR-MCNC: 199 MG/DL — HIGH (ref 70–99)
GLUCOSE SERPL-MCNC: 205 MG/DL — HIGH (ref 70–115)
HCT VFR BLD CALC: 30.1 % — LOW (ref 34.5–45)
HGB BLD-MCNC: 9.7 G/DL — LOW (ref 11.5–15.5)
MAGNESIUM SERPL-MCNC: 2.3 MG/DL — SIGNIFICANT CHANGE UP (ref 1.6–2.6)
MCHC RBC-ENTMCNC: 31.4 PG — SIGNIFICANT CHANGE UP (ref 27–34)
MCHC RBC-ENTMCNC: 32.2 GM/DL — SIGNIFICANT CHANGE UP (ref 32–36)
MCV RBC AUTO: 97.4 FL — SIGNIFICANT CHANGE UP (ref 80–100)
NRBC # BLD: 1 /100 WBCS — HIGH (ref 0–0)
PHOSPHATE SERPL-MCNC: 3.7 MG/DL — SIGNIFICANT CHANGE UP (ref 2.4–4.7)
PLATELET # BLD AUTO: 108 K/UL — LOW (ref 150–400)
POTASSIUM SERPL-MCNC: 5.4 MMOL/L — HIGH (ref 3.5–5.3)
POTASSIUM SERPL-SCNC: 5.4 MMOL/L — HIGH (ref 3.5–5.3)
RBC # BLD: 3.09 M/UL — LOW (ref 3.8–5.2)
RBC # FLD: 14.8 % — HIGH (ref 10.3–14.5)
SODIUM SERPL-SCNC: 132 MMOL/L — LOW (ref 135–145)
WBC # BLD: 6.16 K/UL — SIGNIFICANT CHANGE UP (ref 3.8–10.5)
WBC # FLD AUTO: 6.16 K/UL — SIGNIFICANT CHANGE UP (ref 3.8–10.5)

## 2020-01-05 PROCEDURE — 99232 SBSQ HOSP IP/OBS MODERATE 35: CPT

## 2020-01-05 PROCEDURE — 99233 SBSQ HOSP IP/OBS HIGH 50: CPT

## 2020-01-05 RX ADMIN — Medication 1: at 17:00

## 2020-01-05 RX ADMIN — ENOXAPARIN SODIUM 60 MILLIGRAM(S): 100 INJECTION SUBCUTANEOUS at 11:19

## 2020-01-05 RX ADMIN — Medication 1: at 23:10

## 2020-01-05 RX ADMIN — Medication 81 MILLIGRAM(S): at 11:19

## 2020-01-05 RX ADMIN — Medication 1: at 08:49

## 2020-01-05 RX ADMIN — LISINOPRIL 5 MILLIGRAM(S): 2.5 TABLET ORAL at 05:20

## 2020-01-05 RX ADMIN — LINEZOLID 600 MILLIGRAM(S): 600 INJECTION, SOLUTION INTRAVENOUS at 17:01

## 2020-01-05 RX ADMIN — CARVEDILOL PHOSPHATE 25 MILLIGRAM(S): 80 CAPSULE, EXTENDED RELEASE ORAL at 05:20

## 2020-01-05 RX ADMIN — Medication 1: at 11:19

## 2020-01-05 RX ADMIN — ERTAPENEM SODIUM 120 MILLIGRAM(S): 1 INJECTION, POWDER, LYOPHILIZED, FOR SOLUTION INTRAMUSCULAR; INTRAVENOUS at 23:07

## 2020-01-05 RX ADMIN — Medication 10 MILLIGRAM(S): at 05:20

## 2020-01-05 RX ADMIN — LINEZOLID 600 MILLIGRAM(S): 600 INJECTION, SOLUTION INTRAVENOUS at 05:20

## 2020-01-05 RX ADMIN — Medication 10 MILLIGRAM(S): at 00:20

## 2020-01-05 RX ADMIN — CARVEDILOL PHOSPHATE 25 MILLIGRAM(S): 80 CAPSULE, EXTENDED RELEASE ORAL at 17:01

## 2020-01-05 NOTE — PROGRESS NOTE ADULT - SUBJECTIVE AND OBJECTIVE BOX
[place] [facility] - [facility], THE HEART CENTER                                   [address] [place] [address]                                                      PHONE: [phone]                                                         FAX: [phone]  [email or web address]  ---------------------------------------------------------------------------------------------------------------------------------    Overnight events/patient complaints: Sleeping , arousable, no distress.  Mild dizziness and exertional SOB      No Known Allergies  prefers chocolate glucerna (Unknown)    MEDICATIONS  (STANDING):  aspirin enteric coated 81 milliGRAM(s) Oral daily  carvedilol 25 milliGRAM(s) Oral every 12 hours  dextrose 5%. 1000 milliLiter(s) (50 mL/Hr) IV Continuous <Continuous>  dextrose 50% Injectable 12.5 Gram(s) IV Push once  dextrose 50% Injectable 25 Gram(s) IV Push once  dextrose 50% Injectable 25 Gram(s) IV Push once  enoxaparin Injectable 60 milliGRAM(s) SubCutaneous daily  ertapenem  IVPB 1000 milliGRAM(s) IV Intermittent every 24 hours  insulin lispro (HumaLOG) corrective regimen sliding scale   SubCutaneous Before meals and at bedtime  linezolid    Tablet 600 milliGRAM(s) Oral every 12 hours  lisinopril 5 milliGRAM(s) Oral daily    MEDICATIONS  (PRN):  dextrose 40% Gel 15 Gram(s) Oral once PRN Blood Glucose LESS THAN 70 milliGRAM(s)/deciliter  diltiazem Injectable 10 milliGRAM(s) IV Push every 4 hours PRN prn HR >120/min  glucagon  Injectable 1 milliGRAM(s) IntraMuscular once PRN Glucose LESS THAN 70 milligrams/deciliter      Vital Signs Last 24 Hrs  T(C): 36.5 (05 Jan 2020 07:34), Max: 36.5 (05 Jan 2020 07:34)  T(F): 97.7 (05 Jan 2020 07:34), Max: 97.7 (05 Jan 2020 07:34)  HR: 71 (05 Jan 2020 07:34) (71 - 133)  BP: 122/83 (05 Jan 2020 03:43) (107/57 - 136/81)  BP(mean): --  RR: 18 (05 Jan 2020 07:34) (16 - 18)  SpO2: 98% (05 Jan 2020 07:34) (98% - 100%)  ICU Vital Signs Last 24 Hrs  BLANCA KRISS  I&O's Detail    I&O's Summary    Drug Dosing Weight  BLANCA MONTERROSO      PHYSICAL EXAM:  General: Appears well developed, well nourished alert and cooperative.  HEENT: Head; normocephalic, atraumatic.  Eyes: Pupils reactive, cornea wnl.  Neck: Supple, no nodes adenopathy, no NVD or carotid bruit or thyromegaly.  CARDIOVASCULAR: irregularly irregular, No murmur, rub, gallop or lift.   LUNGS: No rales, rhonchi or wheeze. Normal breath sounds bilaterally.  ABDOMEN: Soft, nontender without mass or organomegaly. bowel sounds normoactive.  EXTREMITIES: No clubbing, cyanosis or edema. Distal pulses wnl.   SKIN: warm and dry with normal turgor.  NEURO: Alert/oriented x 3/normal motor exam. No pathologic reflexes.    PSYCH: normal affect.        LABS:                        9.7    6.16  )-----------( 108      ( 05 Jan 2020 09:52 )             30.1     01-04    136  |  103  |  39.0<H>  ----------------------------<  162<H>  5.0   |  21.0<L>  |  1.19    Ca    8.9      04 Jan 2020 01:37  Phos  3.5     01-04  Mg     2.2     01-04    TPro  6.1<L>  /  Alb  3.7  /  TBili  0.5  /  DBili  x   /  AST  29  /  ALT  19  /  AlkPhos  59  01-03    BLANCA KRISS  CARDIAC MARKERS ( 04 Jan 2020 01:37 )  x     / 0.10 ng/mL / x     / x     / x      CARDIAC MARKERS ( 03 Jan 2020 17:36 )  x     / 0.09 ng/mL / x     / x     / x          PT/INR - ( 04 Jan 2020 02:23 )   PT: 14.0 sec;   INR: 1.21 ratio         PTT - ( 03 Jan 2020 17:36 )  PTT:31.3 sec      RADIOLOGY & ADDITIONAL STUDIES:    INTERPRETATION OF TELEMETRY (personally reviewed):AF, occasional vent pacing. occasional atrial undersensing?        ASSESSMENT AND PLAN:  AF ?new- mildly symtptomatic.  Will plan for MIRTHA/CV, then initiation of low dose amio po for rhythm maintenance.  Will need to transition to oral A/C. Has dual chamber pacemaker.  Chronic systolic CHF- seems euvolemic, coreg increased to 25mg bid, started ACE-I  Osteomyelitis- on abx.  ID f/u

## 2020-01-05 NOTE — PROGRESS NOTE ADULT - SUBJECTIVE AND OBJECTIVE BOX
INTERVAL HPI/OVERNIGHT EVENTS:    CC: RASHAAD,  atrial fibrillation with RVR, diabetes mellitus with osteomyelitis of left 2nd toe, systolic CHF    No overnight events, denies complaints. No shortness of breath    Vital Signs Last 24 Hrs  T(C): 36.5 (05 Jan 2020 15:53), Max: 36.5 (05 Jan 2020 07:34)  T(F): 97.7 (05 Jan 2020 15:53), Max: 97.7 (05 Jan 2020 07:34)  HR: 78 (05 Jan 2020 15:53) (71 - 133)  BP: 122/83 (05 Jan 2020 03:43) (107/57 - 136/81)  BP(mean): --  RR: 17 (05 Jan 2020 15:53) (17 - 18)  SpO2: 100% (05 Jan 2020 15:53) (98% - 100%)    PHYSICAL EXAM:    GENERAL: not in distress,alert  CHEST/LUNG: b/l air entry  HEART: Regular   ABDOMEN: Soft, BS+  EXTREMITIES: 1+ edema, non tender    MEDICATIONS  (STANDING):  aspirin enteric coated 81 milliGRAM(s) Oral daily  carvedilol 25 milliGRAM(s) Oral every 12 hours  dextrose 5%. 1000 milliLiter(s) (50 mL/Hr) IV Continuous <Continuous>  dextrose 50% Injectable 12.5 Gram(s) IV Push once  dextrose 50% Injectable 25 Gram(s) IV Push once  dextrose 50% Injectable 25 Gram(s) IV Push once  enoxaparin Injectable 60 milliGRAM(s) SubCutaneous daily  ertapenem  IVPB 1000 milliGRAM(s) IV Intermittent every 24 hours  insulin lispro (HumaLOG) corrective regimen sliding scale   SubCutaneous Before meals and at bedtime  linezolid    Tablet 600 milliGRAM(s) Oral every 12 hours    MEDICATIONS  (PRN):  dextrose 40% Gel 15 Gram(s) Oral once PRN Blood Glucose LESS THAN 70 milliGRAM(s)/deciliter  diltiazem Injectable 10 milliGRAM(s) IV Push every 4 hours PRN prn HR >120/min  glucagon  Injectable 1 milliGRAM(s) IntraMuscular once PRN Glucose LESS THAN 70 milligrams/deciliter      Allergies    No Known Allergies    Intolerances    prefers chocolate glucerna (Unknown)        LABS:                          9.7    6.16  )-----------( 108      ( 05 Jan 2020 09:52 )             30.1     01-05    132<L>  |  100  |  59.0<H>  ----------------------------<  205<H>  5.4<H>   |  18.0<L>  |  1.32<H>    Ca    8.6      05 Jan 2020 09:52  Phos  3.7     01-05  Mg     2.3     01-05    TPro  6.1<L>  /  Alb  3.7  /  TBili  0.5  /  DBili  x   /  AST  29  /  ALT  19  /  AlkPhos  59  01-03    PT/INR - ( 04 Jan 2020 02:23 )   PT: 14.0 sec;   INR: 1.21 ratio         PTT - ( 03 Jan 2020 17:36 )  PTT:31.3 sec      RADIOLOGY & ADDITIONAL TESTS:

## 2020-01-05 NOTE — PROGRESS NOTE ADULT - ASSESSMENT
86 yr old female with diabetes mellitus, diabetic foot ulcer with recent diagnosis of osteomyelitis on IV antibiotics admitted with complaints of fatigue and worsening shortness of breath on exertion. She was noted to be in new onset atrial fibrillation with RVR. IV Cardizem was given, full dose anticoagulation was initiated and cardiology evaluation was requested. Cardiology advised increasing Coreg and continuing Lovenox. Lisinopril was added. Cardiology advised MIRTHA and cardioversion. Noted to have mild RASHAAD, lisinopril was held.

## 2020-01-05 NOTE — PROGRESS NOTE ADULT - SUBJECTIVE AND OBJECTIVE BOX
[facility]  INFECTIOUS DISEASES AND INTERNAL MEDICINE at [place]  =======================================================  Leandro Monk MD  Diplomates American Board of Internal Medicine and Infectious Diseases  =======================================================    [facility]-[id number]  BLANCA MONTERROSO       Follow up: weakness and loss of appetite, new onset afib as per chart, ID following for OM    No new changes no fever, no new complaint, poor appetite.     PAST MEDICAL & SURGICAL HISTORY:  Pacemaker  Type 2 diabetes mellitus with foot ulcer, unspecified whether long term insulin use  No pertinent past medical history  No significant past surgical history    Social Hx: no smoking, ETOH or drugs.     FAMILY HISTORY:  No pertinent family history in first degree relatives    Allergies  No Known Allergies    Antibiotics:  ertapenem  IVPB 1000 milliGRAM(s) IV Intermittent every 24 hours  linezolid    Tablet 600 milliGRAM(s) Oral every 12 hours     REVIEW OF SYSTEMS:  CONSTITUTIONAL:  No Fever or chills, + weakness  HEENT:  No diplopia or blurred vision.  No sore throat or runny nose.  CARDIOVASCULAR:  No chest pain or SOB.  RESPIRATORY:  No cough, shortness of breath, PND or orthopnea.  GASTROINTESTINAL:  No nausea, vomiting or diarrhea.  GENITOURINARY:  No dysuria, frequency or urgency. No Blood in urine  MUSCULOSKELETAL:  no joint aches, no muscle pain  SKIN:  No change in skin, hair or nails.  NEUROLOGIC:  No paresthesias, fasciculations, seizures or weakness.  PSYCHIATRIC:  No disorder of thought or mood.  ENDOCRINE:  No heat or cold intolerance, polyuria or polydipsia.  HEMATOLOGICAL:  No easy bruising or bleeding.     Physical Exam:  Vital Signs Last 24 Hrs  T(C): 36.5 (05 Jan 2020 07:34), Max: 36.5 (05 Jan 2020 07:34)  T(F): 97.7 (05 Jan 2020 07:34), Max: 97.7 (05 Jan 2020 07:34)  HR: 71 (05 Jan 2020 07:34) (71 - 133)  BP: 122/83 (05 Jan 2020 03:43) (107/57 - 136/81)  BP(mean): --  RR: 18 (05 Jan 2020 07:34) (16 - 18)  SpO2: 98% (05 Jan 2020 07:34) (98% - 100%)  GEN: NAD  HEENT: normocephalic and atraumatic. EOMI. PERRL.    NECK: Supple.  No lymphadenopathy   LUNGS: Clear to auscultation.  HEART: Regular rate and rhythm without murmur.  ABDOMEN: Soft, nontender, and nondistended.  Positive bowel sounds.    : No CVA tenderness  EXTREMITIES: Without any cyanosis, clubbing, rash, lesions or edema.  NEUROLOGIC: grossly intact.  PSYCHIATRIC: Appropriate affect .  SKIN: dry gangrene on left 2nd toe.     Labs:  01-04    136  |  103  |  39.0<H>  ----------------------------<  162<H>  5.0   |  21.0<L>  |  1.19    Ca    8.9      04 Jan 2020 01:37  Phos  3.5     01-04  Mg     2.2     01-04    TPro  6.1<L>  /  Alb  3.7  /  TBili  0.5  /  DBili  x   /  AST  29  /  ALT  19  /  AlkPhos  59  01-03                        11.1   6.65  )-----------( 136      ( 04 Jan 2020 01:37 )             33.8     PT/INR - ( 04 Jan 2020 02:23 )   PT: 14.0 sec;   INR: 1.21 ratio    PTT - ( 03 Jan 2020 17:36 )  PTT:31.3 sec    LIVER FUNCTIONS - ( 03 Jan 2020 17:36 )  Alb: 3.7 g/dL / Pro: 6.1 g/dL / ALK PHOS: 59 U/L / ALT: 19 U/L / AST: 29 U/L / GGT: x           CARDIAC MARKERS ( 04 Jan 2020 01:37 )  x     / 0.10 ng/mL / x     / x     / x      CARDIAC MARKERS ( 03 Jan 2020 17:36 )  x     / 0.09 ng/mL / x     / x     / x          All imaging and other data have been reviewed.    Assessment and Plan:   87y/o woman with PMH of HTN, DM, HFrEF that was discharged from [facility] recently after being treated for diabetic foot ulcer/osteo, was readmitted for worsening fatigue, STINSON and palpitations. She has a PICC line and went home on IV ertapenem and linezolid. Since she was not feeling well and a fib was new onset, she was admitted for more work up.   she had toe nailed clipped about 1 month ago. There was incidental clip injury to tip of left 2nd toe.  Since the toe has become discolored, tender, swollen and she hs had difficulty ambulating or bearing weight on left foot    Left 2nd toe dry gangrene  diabetic foot infection  Diabetes mellitus type 2  Pacemaker present    - Blood cultures No Growth   - Xray and MRI OM of 2nd toe (distal and middle phalanx)   - ESR 7 and CRP <0.04  - Plan was treating with Ertapenem 1gm daily and Linezolid 600mg q12 po, total 2 weeks and then switch to po   - When ready for discharge will remove Midline and start her on oral Linezolid 600mg q12h and Levaquin 500mg daily, last day would be 1/29  - No SSRI or MAOIs, no wine and cheese.   - Weekly labs CBC and BMP   - Vascular follow up as outpatient   - ID follow up weekly after discharge    Will follow.

## 2020-01-06 LAB
ANION GAP SERPL CALC-SCNC: 11 MMOL/L — SIGNIFICANT CHANGE UP (ref 5–17)
BUN SERPL-MCNC: 66 MG/DL — HIGH (ref 8–20)
CALCIUM SERPL-MCNC: 8.2 MG/DL — LOW (ref 8.6–10.2)
CHLORIDE SERPL-SCNC: 102 MMOL/L — SIGNIFICANT CHANGE UP (ref 98–107)
CO2 SERPL-SCNC: 19 MMOL/L — LOW (ref 22–29)
CREAT SERPL-MCNC: 1.31 MG/DL — HIGH (ref 0.5–1.3)
GLUCOSE BLDC GLUCOMTR-MCNC: 104 MG/DL — HIGH (ref 70–99)
GLUCOSE BLDC GLUCOMTR-MCNC: 155 MG/DL — HIGH (ref 70–99)
GLUCOSE BLDC GLUCOMTR-MCNC: 160 MG/DL — HIGH (ref 70–99)
GLUCOSE BLDC GLUCOMTR-MCNC: 183 MG/DL — HIGH (ref 70–99)
GLUCOSE SERPL-MCNC: 138 MG/DL — HIGH (ref 70–115)
HCT VFR BLD CALC: 29.6 % — LOW (ref 34.5–45)
HGB BLD-MCNC: 9.5 G/DL — LOW (ref 11.5–15.5)
MAGNESIUM SERPL-MCNC: 2.3 MG/DL — SIGNIFICANT CHANGE UP (ref 1.6–2.6)
MCHC RBC-ENTMCNC: 31.3 PG — SIGNIFICANT CHANGE UP (ref 27–34)
MCHC RBC-ENTMCNC: 32.1 GM/DL — SIGNIFICANT CHANGE UP (ref 32–36)
MCV RBC AUTO: 97.4 FL — SIGNIFICANT CHANGE UP (ref 80–100)
PLATELET # BLD AUTO: 111 K/UL — LOW (ref 150–400)
POTASSIUM SERPL-MCNC: 5.4 MMOL/L — HIGH (ref 3.5–5.3)
POTASSIUM SERPL-SCNC: 5.4 MMOL/L — HIGH (ref 3.5–5.3)
RBC # BLD: 3.04 M/UL — LOW (ref 3.8–5.2)
RBC # FLD: 14.7 % — HIGH (ref 10.3–14.5)
SODIUM SERPL-SCNC: 132 MMOL/L — LOW (ref 135–145)
WBC # BLD: 7.77 K/UL — SIGNIFICANT CHANGE UP (ref 3.8–10.5)
WBC # FLD AUTO: 7.77 K/UL — SIGNIFICANT CHANGE UP (ref 3.8–10.5)

## 2020-01-06 PROCEDURE — 99232 SBSQ HOSP IP/OBS MODERATE 35: CPT

## 2020-01-06 PROCEDURE — 99233 SBSQ HOSP IP/OBS HIGH 50: CPT

## 2020-01-06 RX ADMIN — CARVEDILOL PHOSPHATE 25 MILLIGRAM(S): 80 CAPSULE, EXTENDED RELEASE ORAL at 05:53

## 2020-01-06 RX ADMIN — ENOXAPARIN SODIUM 60 MILLIGRAM(S): 100 INJECTION SUBCUTANEOUS at 12:33

## 2020-01-06 RX ADMIN — Medication 81 MILLIGRAM(S): at 12:33

## 2020-01-06 RX ADMIN — Medication 1: at 17:15

## 2020-01-06 RX ADMIN — Medication 1: at 21:48

## 2020-01-06 RX ADMIN — CARVEDILOL PHOSPHATE 25 MILLIGRAM(S): 80 CAPSULE, EXTENDED RELEASE ORAL at 17:15

## 2020-01-06 RX ADMIN — ERTAPENEM SODIUM 120 MILLIGRAM(S): 1 INJECTION, POWDER, LYOPHILIZED, FOR SOLUTION INTRAMUSCULAR; INTRAVENOUS at 21:46

## 2020-01-06 RX ADMIN — Medication 1: at 12:37

## 2020-01-06 RX ADMIN — LINEZOLID 600 MILLIGRAM(S): 600 INJECTION, SOLUTION INTRAVENOUS at 05:52

## 2020-01-06 RX ADMIN — LINEZOLID 600 MILLIGRAM(S): 600 INJECTION, SOLUTION INTRAVENOUS at 17:15

## 2020-01-06 NOTE — PROGRESS NOTE ADULT - SUBJECTIVE AND OBJECTIVE BOX
INTERVAL HPI/OVERNIGHT EVENTS:  86yFemale    Vital Signs Last 24 Hrs  T(C): 36.6 (06 Jan 2020 16:00), Max: 36.6 (06 Jan 2020 16:00)  T(F): 97.9 (06 Jan 2020 16:00), Max: 97.9 (06 Jan 2020 16:00)  HR: 131 (06 Jan 2020 16:00) (71 - 131)  BP: 110/69 (06 Jan 2020 07:55) (103/56 - 123/68)  BP(mean): 88 (06 Jan 2020 04:47) (88 - 88)  RR: 20 (06 Jan 2020 16:00) (17 - 20)  SpO2: 100% (06 Jan 2020 16:00) (98% - 100%)    PHYSICAL EXAM:    GENERAL: NAD, well-groomed, well-developed  HEAD:  Atraumatic, Normocephalic  EYES: EOMI, PERRLA, conjunctiva and sclera clear  ENMT: Moist mucous membranes  NECK: Supple, No JVD  NERVOUS SYSTEM:  Alert & Oriented X3, Motor Strength 5/5 B/L upper and lower extremities; DTRs 2+ intact and symmetric  CHEST/LUNG: Clear to auscultation bilaterally; No rales, rhonchi, wheezing, or rubs  HEART: Regular rate and rhythm; No murmurs, rubs, or gallops  ABDOMEN: Soft, Nontender, Nondistended; Bowel sounds present  EXTREMITIES:  2+ Peripheral Pulses, No clubbing, cyanosis, or edema    MEDICATIONS  (STANDING):  aspirin enteric coated 81 milliGRAM(s) Oral daily  carvedilol 25 milliGRAM(s) Oral every 12 hours  dextrose 5%. 1000 milliLiter(s) (50 mL/Hr) IV Continuous <Continuous>  dextrose 50% Injectable 12.5 Gram(s) IV Push once  dextrose 50% Injectable 25 Gram(s) IV Push once  dextrose 50% Injectable 25 Gram(s) IV Push once  enoxaparin Injectable 60 milliGRAM(s) SubCutaneous daily  ertapenem  IVPB 1000 milliGRAM(s) IV Intermittent every 24 hours  insulin lispro (HumaLOG) corrective regimen sliding scale   SubCutaneous Before meals and at bedtime  linezolid    Tablet 600 milliGRAM(s) Oral every 12 hours    MEDICATIONS  (PRN):  dextrose 40% Gel 15 Gram(s) Oral once PRN Blood Glucose LESS THAN 70 milliGRAM(s)/deciliter  diltiazem Injectable 10 milliGRAM(s) IV Push every 4 hours PRN prn HR >120/min  glucagon  Injectable 1 milliGRAM(s) IntraMuscular once PRN Glucose LESS THAN 70 milligrams/deciliter      Allergies    No Known Allergies    Intolerances    prefers chocolate glucerna (Unknown)        LABS:                          9.5    7.77  )-----------( 111      ( 06 Jan 2020 07:54 )             29.6     01-06    132<L>  |  102  |  66.0<H>  ----------------------------<  138<H>  5.4<H>   |  19.0<L>  |  1.31<H>    Ca    8.2<L>      06 Jan 2020 07:54  Phos  3.7     01-05  Mg     2.3     01-06            RADIOLOGY & ADDITIONAL TESTS: INTERVAL HPI/OVERNIGHT EVENTS:    CC: RASHAAD,  atrial fibrillation with RVR, diabetes mellitus with osteomyelitis of left 2nd toe, systolic CHF      No overnight events, was scheduled for DCCV, now canceled by cardiology. Discussed advance directives with patient, she stated she wants to be full code.    Vital Signs Last 24 Hrs  T(C): 36.6 (06 Jan 2020 16:00), Max: 36.6 (06 Jan 2020 16:00)  T(F): 97.9 (06 Jan 2020 16:00), Max: 97.9 (06 Jan 2020 16:00)  HR: 131 (06 Jan 2020 16:00) (71 - 131)  BP: 110/69 (06 Jan 2020 07:55) (103/56 - 123/68)  BP(mean): 88 (06 Jan 2020 04:47) (88 - 88)  RR: 20 (06 Jan 2020 16:00) (17 - 20)  SpO2: 100% (06 Jan 2020 16:00) (98% - 100%)    PHYSICAL EXAM:    GENERAL: Not in distress, alert  CHEST/LUNG: b/l air entry  HEART: Regular  ABDOMEN: Soft, BS+  EXTREMITIES: no edema, tenderness    MEDICATIONS  (STANDING):  aspirin enteric coated 81 milliGRAM(s) Oral daily  carvedilol 25 milliGRAM(s) Oral every 12 hours  dextrose 5%. 1000 milliLiter(s) (50 mL/Hr) IV Continuous <Continuous>  dextrose 50% Injectable 12.5 Gram(s) IV Push once  dextrose 50% Injectable 25 Gram(s) IV Push once  dextrose 50% Injectable 25 Gram(s) IV Push once  enoxaparin Injectable 60 milliGRAM(s) SubCutaneous daily  ertapenem  IVPB 1000 milliGRAM(s) IV Intermittent every 24 hours  insulin lispro (HumaLOG) corrective regimen sliding scale   SubCutaneous Before meals and at bedtime  linezolid    Tablet 600 milliGRAM(s) Oral every 12 hours    MEDICATIONS  (PRN):  dextrose 40% Gel 15 Gram(s) Oral once PRN Blood Glucose LESS THAN 70 milliGRAM(s)/deciliter  diltiazem Injectable 10 milliGRAM(s) IV Push every 4 hours PRN prn HR >120/min  glucagon  Injectable 1 milliGRAM(s) IntraMuscular once PRN Glucose LESS THAN 70 milligrams/deciliter      Allergies    No Known Allergies    Intolerances    prefers chocolate glucerna (Unknown)        LABS:                          9.5    7.77  )-----------( 111      ( 06 Jan 2020 07:54 )             29.6     01-06    132<L>  |  102  |  66.0<H>  ----------------------------<  138<H>  5.4<H>   |  19.0<L>  |  1.31<H>    Ca    8.2<L>      06 Jan 2020 07:54  Phos  3.7     01-05  Mg     2.3     01-06            RADIOLOGY & ADDITIONAL TESTS:

## 2020-01-06 NOTE — PROGRESS NOTE ADULT - SUBJECTIVE AND OBJECTIVE BOX
Chief Complaint: chart and hx reviewed.    HPI: 85 yo F with diabetes and PICC line for osteomyelitis/hx of pacemaker. Noted to be in afib and thought to be new. However, I reviewed EKG from 12/19 and it showed afib as well.    PAST MEDICAL & SURGICAL HISTORY:  Pacemaker  Type 2 diabetes mellitus with foot ulcer, unspecified whether long term insulin use  No pertinent past medical history  No significant past surgical history      PREVIOUS DIAGNOSTIC TESTING:      ECHO  FINDINGS: LVEF 35% with mild MR and severe TR/grossly dilated LA. Mild AS    STRESS  FINDINGS:    CATHETERIZATION  FINDINGS:    MEDICATIONS  (STANDING):  aspirin enteric coated 81 milliGRAM(s) Oral daily  carvedilol 25 milliGRAM(s) Oral every 12 hours  dextrose 5%. 1000 milliLiter(s) (50 mL/Hr) IV Continuous <Continuous>  dextrose 50% Injectable 12.5 Gram(s) IV Push once  dextrose 50% Injectable 25 Gram(s) IV Push once  dextrose 50% Injectable 25 Gram(s) IV Push once  enoxaparin Injectable 60 milliGRAM(s) SubCutaneous daily  ertapenem  IVPB 1000 milliGRAM(s) IV Intermittent every 24 hours  insulin lispro (HumaLOG) corrective regimen sliding scale   SubCutaneous Before meals and at bedtime  linezolid    Tablet 600 milliGRAM(s) Oral every 12 hours    MEDICATIONS  (PRN):  dextrose 40% Gel 15 Gram(s) Oral once PRN Blood Glucose LESS THAN 70 milliGRAM(s)/deciliter  diltiazem Injectable 10 milliGRAM(s) IV Push every 4 hours PRN prn HR >120/min  glucagon  Injectable 1 milliGRAM(s) IntraMuscular once PRN Glucose LESS THAN 70 milligrams/deciliter      FAMILY HISTORY:  No pertinent family history in first degree relatives      ROS: Negative other than as mentioned in HPI.    Vital Signs Last 24 Hrs  T(C): 36.3 (06 Jan 2020 07:55), Max: 36.5 (05 Jan 2020 15:53)  T(F): 97.4 (06 Jan 2020 07:55), Max: 97.7 (05 Jan 2020 15:53)  HR: 85 irreg (06 Jan 2020 07:55) (71 - 117)  BP: 105/70 RA manually (06 Jan 2020 07:55) (103/56 - 123/68)  BP(mean): 88 (06 Jan 2020 04:47) (88 - 88)  RR: 14 flat ora (06 Jan 2020 07:55) (17 - 18)  SpO2: 100% (06 Jan 2020 07:55) (98% - 100%)    PHYSICAL EXAM:  General: Appears well developed, well nourished alert and cooperative. Elderly afebrile Mexican speaking F lying flat NAD.  HEENT: Head; normocephalic, atraumatic.  Eyes;   Pupils reactive, cornea wnl.  Neck; Supple, no nodes adenopathy, + NVD No carotid bruit or thyromegaly.  CARDIOVASCULAR; No murmur, rub, gallop or lift. Normal S1 and S2. Irreg rhythm  LUNGS; good BS bilat.  ABDOMEN ; Soft, nontender without mass or organomegaly. bowel sounds normoactive.  EXTREMITIES; No clubbing, cyanosis or edema.  ROM normal.  SKIN; warm and dry with normal turgor.  NEURO; Alert/oriented x 3/normal motor exam.   PSYCH; normal affect.            INTERPRETATION OF TELEMETRY:    ECG: reviewed. Afib and paced.  cxr: reviewed image; cardiomeg/ca++ aortic knob/  DDD pacer.     I&O's Detail      LABS:                        9.5    7.77  )-----------( 111      ( 06 Jan 2020 07:54 )             29.6     01-06    132<L>  |  102  |  66.0<H>  ----------------------------<  138<H>  5.4<H>   |  19.0<L>  |  1.31<H>    Ca    8.2<L>      06 Jan 2020 07:54  Phos  3.7     01-05  Mg     2.3     01-06              I&O's Summary      RADIOLOGY & ADDITIONAL STUDIES:

## 2020-01-06 NOTE — PROGRESS NOTE ADULT - ASSESSMENT
1. 87 yo F with osteomyelitis. On broadspectrum IV antibiotics.  2. afib-dates back at least to mid December. Pt is unlikely to hold in SR if she undergoes cardioversion given her LV dysfunction/mr/tr and atrial dilation-would rate control and consider low dose DOAC ie eliquis 2.5 bid  3. renal impairment  4. pacemaker.  5. anemia.  6. cardiomyopathy-confirmed by echo-etiology? Her troponin is mildly elevated-will repeat and trend.

## 2020-01-06 NOTE — PROGRESS NOTE ADULT - PROBLEM SELECTOR PLAN 5
Hold ACEI today, monitor renal function. Defer IVF given CHF. Continue to hold ACEI. Monitor renal function.

## 2020-01-06 NOTE — PROGRESS NOTE ADULT - ASSESSMENT
86 yr old female with diabetes mellitus, diabetic foot ulcer with recent diagnosis of osteomyelitis on IV antibiotics admitted with complaints of fatigue and worsening shortness of breath on exertion. She was noted to be in new onset atrial fibrillation with RVR. IV Cardizem was given, full dose anticoagulation was initiated and cardiology evaluation was requested. Cardiology advised increasing Coreg and continuing Lovenox. Lisinopril was added. Cardiology advised MIRTHA and cardioversion. Noted to have mild RASHAAD, lisinopril was held. 86 yr old female with diabetes mellitus, diabetic foot ulcer with recent diagnosis of osteomyelitis on IV antibiotics admitted with complaints of fatigue and worsening shortness of breath on exertion. She was noted to be in new onset atrial fibrillation with RVR. IV Cardizem was given, full dose anticoagulation was initiated and cardiology evaluation was requested. Cardiology advised increasing Coreg and continuing Lovenox. Lisinopril was added. Cardiology advised MIRTHA and cardioversion. Noted to have mild RASHAAD, lisinopril was held. Initially planned for DCCV, later advised rate control only. PT and palliative care consulted.

## 2020-01-06 NOTE — PROGRESS NOTE ADULT - SUBJECTIVE AND OBJECTIVE BOX
[facility]  INFECTIOUS DISEASES AND INTERNAL MEDICINE at [place]  =======================================================  Leandro Monk MD  Diplomates American Board of Internal Medicine and Infectious Diseases  =======================================================    N-[id number]  BLANCA MONTERROSO       Follow up: weakness and loss of appetite, new onset afib, ID following for OM    No new changes no fever, no new complaint.  Will have MIRTHA today if negative, will proceed with cardioversion.     PAST MEDICAL & SURGICAL HISTORY:  Pacemaker  Type 2 diabetes mellitus with foot ulcer, unspecified whether long term insulin use  No pertinent past medical history  No significant past surgical history    Social Hx: no smoking, ETOH or drugs.     FAMILY HISTORY:  No pertinent family history in first degree relatives    Allergies  No Known Allergies    Antibiotics:  ertapenem  IVPB 1000 milliGRAM(s) IV Intermittent every 24 hours  linezolid    Tablet 600 milliGRAM(s) Oral every 12 hours     REVIEW OF SYSTEMS:  CONSTITUTIONAL:  No Fever or chills, + weakness  HEENT:  No diplopia or blurred vision.  No sore throat or runny nose.  CARDIOVASCULAR:  No chest pain or SOB.  RESPIRATORY:  No cough, shortness of breath, PND or orthopnea.  GASTROINTESTINAL:  No nausea, vomiting or diarrhea.  GENITOURINARY:  No dysuria, frequency or urgency. No Blood in urine  MUSCULOSKELETAL:  no joint aches, no muscle pain  SKIN:  No change in skin, hair or nails.  NEUROLOGIC:  No paresthesias, fasciculations, seizures or weakness.  PSYCHIATRIC:  No disorder of thought or mood.  ENDOCRINE:  No heat or cold intolerance, polyuria or polydipsia.  HEMATOLOGICAL:  No easy bruising or bleeding.     Physical Exam:  Vital Signs Last 24 Hrs  T(C): 36.3 (06 Jan 2020 07:55), Max: 36.5 (05 Jan 2020 15:53)  T(F): 97.4 (06 Jan 2020 07:55), Max: 97.7 (05 Jan 2020 15:53)  HR: 117 (06 Jan 2020 07:55) (71 - 117)  BP: 110/69 (06 Jan 2020 07:55) (103/56 - 123/68)  BP(mean): 88 (06 Jan 2020 04:47) (88 - 88)  RR: 18 (06 Jan 2020 07:55) (17 - 18)  SpO2: 100% (06 Jan 2020 07:55) (98% - 100%)  GEN: NAD  HEENT: normocephalic and atraumatic. EOMI. PERRL.    NECK: Supple.  No lymphadenopathy   LUNGS: Clear to auscultation.  HEART: Regular rate and rhythm without murmur.  ABDOMEN: Soft, nontender, and nondistended.  Positive bowel sounds.    : No CVA tenderness  EXTREMITIES: Without any cyanosis, clubbing, rash, lesions or edema.  NEUROLOGIC: grossly intact.  PSYCHIATRIC: Appropriate affect .  SKIN: dry gangrene on left 2nd toe.     Labs:  01-06    132<L>  |  102  |  66.0<H>  ----------------------------<  138<H>  5.4<H>   |  19.0<L>  |  1.31<H>    Ca    8.2<L>      06 Jan 2020 07:54  Phos  3.7     01-05  Mg     2.3     01-06                        9.5    7.77  )-----------( 111      ( 06 Jan 2020 07:54 )             29.6     RECENT CULTURES:  01-04 @ 01:47 .Blood     No growth at 48 hours    01-04 @ 01:46 .Blood     No growth at 48 hours    All imaging and other data have been reviewed.    Assessment and Plan:   87y/o woman with PMH of HTN, DM, HFrEF that was discharged from [facility] recently after being treated for diabetic foot ulcer/osteo, was readmitted for worsening fatigue, STINSON and palpitations. She has a PICC line and went home on IV ertapenem and linezolid. Since she was not feeling well and a fib was new onset, she was admitted for more work up.   she had toe nailed clipped about 1 month ago. There was incidental clip injury to tip of left 2nd toe.  Since the toe has become discolored, tender, swollen and she hs had difficulty ambulating or bearing weight on left foot    Left 2nd toe dry gangrene  diabetic foot infection  Diabetes mellitus type 2  Pacemaker present    - Blood cultures again NGTD on 1/4 and from last admission  - Xray and MRI OM of 2nd toe (distal and middle phalanx)   - ESR 7 and CRP <0.04  - Continue Ertapenem 1gm daily and Linezolid 600mg q12 po while in the hospital   - When ready for discharge will remove Midline and start her on oral Linezolid 600mg q12h and Levaquin 500mg daily, last day would be 1/29  - No SSRI or MAOIs, no wine and cheese.   - Weekly labs CBC and BMP   - Vascular follow up as outpatient   - ID follow up weekly after discharge    Will follow.

## 2020-01-06 NOTE — PROGRESS NOTE ADULT - PROBLEM SELECTOR PLAN 1
Plan for MIRTHA and cardioversion in am, continue AC and coreg. Rate control as per cardiology, on AC. No DCCV.

## 2020-01-07 DIAGNOSIS — E87.5 HYPERKALEMIA: ICD-10-CM

## 2020-01-07 DIAGNOSIS — R53.2 FUNCTIONAL QUADRIPLEGIA: ICD-10-CM

## 2020-01-07 DIAGNOSIS — M86.9 OSTEOMYELITIS, UNSPECIFIED: ICD-10-CM

## 2020-01-07 DIAGNOSIS — G93.49 OTHER ENCEPHALOPATHY: ICD-10-CM

## 2020-01-07 DIAGNOSIS — Z51.5 ENCOUNTER FOR PALLIATIVE CARE: ICD-10-CM

## 2020-01-07 DIAGNOSIS — Z71.89 OTHER SPECIFIED COUNSELING: ICD-10-CM

## 2020-01-07 LAB
ANION GAP SERPL CALC-SCNC: 14 MMOL/L — SIGNIFICANT CHANGE UP (ref 5–17)
ANISOCYTOSIS BLD QL: SLIGHT — SIGNIFICANT CHANGE UP
BASOPHILS # BLD AUTO: 0 K/UL — SIGNIFICANT CHANGE UP (ref 0–0.2)
BASOPHILS NFR BLD AUTO: 0 % — SIGNIFICANT CHANGE UP (ref 0–2)
BUN SERPL-MCNC: 71 MG/DL — HIGH (ref 8–20)
CALCIUM SERPL-MCNC: 8.5 MG/DL — LOW (ref 8.6–10.2)
CHLORIDE SERPL-SCNC: 104 MMOL/L — SIGNIFICANT CHANGE UP (ref 98–107)
CO2 SERPL-SCNC: 19 MMOL/L — LOW (ref 22–29)
CREAT SERPL-MCNC: 1.22 MG/DL — SIGNIFICANT CHANGE UP (ref 0.5–1.3)
ELLIPTOCYTES BLD QL SMEAR: SLIGHT — SIGNIFICANT CHANGE UP
EOSINOPHIL # BLD AUTO: 0 K/UL — SIGNIFICANT CHANGE UP (ref 0–0.5)
EOSINOPHIL NFR BLD AUTO: 0 % — SIGNIFICANT CHANGE UP (ref 0–6)
GIANT PLATELETS BLD QL SMEAR: PRESENT — SIGNIFICANT CHANGE UP
GLUCOSE BLDC GLUCOMTR-MCNC: 145 MG/DL — HIGH (ref 70–99)
GLUCOSE BLDC GLUCOMTR-MCNC: 159 MG/DL — HIGH (ref 70–99)
GLUCOSE BLDC GLUCOMTR-MCNC: 169 MG/DL — HIGH (ref 70–99)
GLUCOSE BLDC GLUCOMTR-MCNC: 177 MG/DL — HIGH (ref 70–99)
GLUCOSE SERPL-MCNC: 166 MG/DL — HIGH (ref 70–115)
HCT VFR BLD CALC: 26 % — LOW (ref 34.5–45)
HGB BLD-MCNC: 8.1 G/DL — LOW (ref 11.5–15.5)
HYPOCHROMIA BLD QL: SLIGHT — SIGNIFICANT CHANGE UP
LYMPHOCYTES # BLD AUTO: 0.52 K/UL — LOW (ref 1–3.3)
LYMPHOCYTES # BLD AUTO: 7.9 % — LOW (ref 13–44)
MACROCYTES BLD QL: SLIGHT — SIGNIFICANT CHANGE UP
MAGNESIUM SERPL-MCNC: 2.5 MG/DL — SIGNIFICANT CHANGE UP (ref 1.8–2.6)
MANUAL SMEAR VERIFICATION: SIGNIFICANT CHANGE UP
MCHC RBC-ENTMCNC: 30.5 PG — SIGNIFICANT CHANGE UP (ref 27–34)
MCHC RBC-ENTMCNC: 31.2 GM/DL — LOW (ref 32–36)
MCV RBC AUTO: 97.7 FL — SIGNIFICANT CHANGE UP (ref 80–100)
MICROCYTES BLD QL: SLIGHT — SIGNIFICANT CHANGE UP
MONOCYTES # BLD AUTO: 0.06 K/UL — SIGNIFICANT CHANGE UP (ref 0–0.9)
MONOCYTES NFR BLD AUTO: 0.9 % — LOW (ref 2–14)
NEUTROPHILS # BLD AUTO: 5.96 K/UL — SIGNIFICANT CHANGE UP (ref 1.8–7.4)
NEUTROPHILS NFR BLD AUTO: 91.2 % — HIGH (ref 43–77)
OVALOCYTES BLD QL SMEAR: SLIGHT — SIGNIFICANT CHANGE UP
PLAT MORPH BLD: ABNORMAL
PLATELET # BLD AUTO: 91 K/UL — LOW (ref 150–400)
PLATELET COUNT - ESTIMATE: ABNORMAL
POIKILOCYTOSIS BLD QL AUTO: SLIGHT — SIGNIFICANT CHANGE UP
POTASSIUM SERPL-MCNC: 5.5 MMOL/L — HIGH (ref 3.5–5.3)
POTASSIUM SERPL-SCNC: 5.5 MMOL/L — HIGH (ref 3.5–5.3)
RBC # BLD: 2.66 M/UL — LOW (ref 3.8–5.2)
RBC # FLD: 14.7 % — HIGH (ref 10.3–14.5)
RBC BLD AUTO: ABNORMAL
SODIUM SERPL-SCNC: 137 MMOL/L — SIGNIFICANT CHANGE UP (ref 135–145)
TROPONIN T SERPL-MCNC: 0.09 NG/ML — HIGH (ref 0–0.06)
WBC # BLD: 6.53 K/UL — SIGNIFICANT CHANGE UP (ref 3.8–10.5)
WBC # FLD AUTO: 6.53 K/UL — SIGNIFICANT CHANGE UP (ref 3.8–10.5)

## 2020-01-07 PROCEDURE — 99497 ADVNCD CARE PLAN 30 MIN: CPT | Mod: 25

## 2020-01-07 PROCEDURE — 99233 SBSQ HOSP IP/OBS HIGH 50: CPT

## 2020-01-07 PROCEDURE — 99223 1ST HOSP IP/OBS HIGH 75: CPT

## 2020-01-07 PROCEDURE — 99232 SBSQ HOSP IP/OBS MODERATE 35: CPT

## 2020-01-07 RX ADMIN — LINEZOLID 600 MILLIGRAM(S): 600 INJECTION, SOLUTION INTRAVENOUS at 05:51

## 2020-01-07 RX ADMIN — Medication 1: at 22:08

## 2020-01-07 RX ADMIN — Medication 1: at 08:19

## 2020-01-07 RX ADMIN — ENOXAPARIN SODIUM 60 MILLIGRAM(S): 100 INJECTION SUBCUTANEOUS at 22:26

## 2020-01-07 RX ADMIN — CARVEDILOL PHOSPHATE 25 MILLIGRAM(S): 80 CAPSULE, EXTENDED RELEASE ORAL at 05:50

## 2020-01-07 RX ADMIN — LINEZOLID 600 MILLIGRAM(S): 600 INJECTION, SOLUTION INTRAVENOUS at 22:28

## 2020-01-07 RX ADMIN — Medication 81 MILLIGRAM(S): at 18:46

## 2020-01-07 RX ADMIN — Medication 1: at 13:03

## 2020-01-07 RX ADMIN — Medication 10 MILLIGRAM(S): at 02:26

## 2020-01-07 RX ADMIN — Medication 10 MILLIGRAM(S): at 19:14

## 2020-01-07 RX ADMIN — CARVEDILOL PHOSPHATE 25 MILLIGRAM(S): 80 CAPSULE, EXTENDED RELEASE ORAL at 19:14

## 2020-01-07 NOTE — PROGRESS NOTE ADULT - ASSESSMENT
86 yr old female with diabetes mellitus, diabetic foot ulcer with recent diagnosis of osteomyelitis on IV antibiotics admitted with complaints of fatigue and worsening shortness of breath on exertion. She was noted to be in new onset atrial fibrillation with RVR. IV Cardizem was given, full dose anticoagulation was initiated and cardiology evaluation was requested. Cardiology advised increasing Coreg and continuing Lovenox. Lisinopril was added. Cardiology advised MIRTHA and cardioversion. Noted to have mild RASHAAD, lisinopril was held. Initially planned for DCCV, later advised rate control only. PT and palliative care consulted. Palliative care was consulted, patient made DNR/DNI. Given anemia and black stools, FOBT was ordered.

## 2020-01-07 NOTE — PROGRESS NOTE ADULT - PROBLEM SELECTOR PLAN 1
Rate better controlled, continue medications, continue AC for now, will decide long term, check FOBT, monitor Hb.

## 2020-01-07 NOTE — PROGRESS NOTE ADULT - SUBJECTIVE AND OBJECTIVE BOX
INTERVAL HPI/OVERNIGHT EVENTS:    CC: hyperkalemia, atrial fibrillation with RVR, diabetes mellitus with osteomyelitis of left 2nd toe, systolic CHF      No overnight events, black stools per RN. More tired today.    Vital Signs Last 24 Hrs  T(C): 36.3 (07 Jan 2020 15:35), Max: 36.8 (06 Jan 2020 23:53)  T(F): 97.4 (07 Jan 2020 15:35), Max: 98.2 (06 Jan 2020 23:53)  HR: 90 (07 Jan 2020 15:35) (72 - 97)  BP: 118/70 (07 Jan 2020 15:35) (93/60 - 118/70)  BP(mean): --  RR: 18 (07 Jan 2020 15:35) (16 - 18)  SpO2: 98% (07 Jan 2020 08:15) (96% - 100%)    PHYSICAL EXAM:    GENERAL: pale, not in distress, alert  CHEST/LUNG: b/l air entry  HEART: Regular   ABDOMEN: Soft, BS+, non tender  EXTREMITIES:  no edema, tenderness    MEDICATIONS  (STANDING):  aspirin enteric coated 81 milliGRAM(s) Oral daily  carvedilol 25 milliGRAM(s) Oral every 12 hours  dextrose 5%. 1000 milliLiter(s) (50 mL/Hr) IV Continuous <Continuous>  dextrose 50% Injectable 12.5 Gram(s) IV Push once  dextrose 50% Injectable 25 Gram(s) IV Push once  dextrose 50% Injectable 25 Gram(s) IV Push once  enoxaparin Injectable 60 milliGRAM(s) SubCutaneous daily  insulin lispro (HumaLOG) corrective regimen sliding scale   SubCutaneous Before meals and at bedtime  levoFLOXacin  Tablet 500 milliGRAM(s) Oral once  linezolid    Tablet 600 milliGRAM(s) Oral every 12 hours    MEDICATIONS  (PRN):  dextrose 40% Gel 15 Gram(s) Oral once PRN Blood Glucose LESS THAN 70 milliGRAM(s)/deciliter  diltiazem Injectable 10 milliGRAM(s) IV Push every 4 hours PRN prn HR >120/min  glucagon  Injectable 1 milliGRAM(s) IntraMuscular once PRN Glucose LESS THAN 70 milligrams/deciliter      Allergies    No Known Allergies    Intolerances    prefers chocolate glucerna (Unknown)        LABS:                          8.1    6.53  )-----------( 91       ( 07 Jan 2020 07:51 )             26.0     01-07    137  |  104  |  71.0<H>  ----------------------------<  166<H>  5.5<H>   |  19.0<L>  |  1.22    Ca    8.5<L>      07 Jan 2020 07:51  Mg     2.5     01-07            RADIOLOGY & ADDITIONAL TESTS:

## 2020-01-07 NOTE — PROGRESS NOTE ADULT - ASSESSMENT
1. 85 yo F with osteomyelitis  2. afib-not a candidate for cardioversion. With report of dark stools and falling h/h pt is likely a poor candidate for oral AC. Obtain stool for blood.  3. elevated troponin not consistent with MI.  4. PPM.  5. renal impairment

## 2020-01-07 NOTE — CONSULT NOTE ADULT - PROBLEM SELECTOR RECOMMENDATION 3
Multifactorial  Pt confused stating that she is in [place] and not in the hospital. Pt mumbling and unable to understand.

## 2020-01-07 NOTE — PROGRESS NOTE ADULT - SUBJECTIVE AND OBJECTIVE BOX
[facility]  INFECTIOUS DISEASES AND INTERNAL MEDICINE at [place]  =======================================================  Leandro Monk MD  Diplomates American Board of Internal Medicine and Infectious Diseases  =======================================================    [facility]-[phone]  BLANCA MONTERROSO     Used phone      Follow up: weakness and loss of appetite, new onset afib, ID following for OM    No new changes no fever, no new complaint.  Followed with cardiology for afib treatment.     PAST MEDICAL & SURGICAL HISTORY:  Pacemaker  Type 2 diabetes mellitus with foot ulcer, unspecified whether long term insulin use  No pertinent past medical history  No significant past surgical history    Social Hx: no smoking, ETOH or drugs.     FAMILY HISTORY:  No pertinent family history in first degree relatives    Allergies  No Known Allergies    Antibiotics:  ertapenem  IVPB 1000 milliGRAM(s) IV Intermittent every 24 hours  linezolid    Tablet 600 milliGRAM(s) Oral every 12 hours     REVIEW OF SYSTEMS:  CONSTITUTIONAL:  No Fever or chills, + weakness  HEENT:  No diplopia or blurred vision.  No sore throat or runny nose.  CARDIOVASCULAR:  No chest pain or SOB.  RESPIRATORY:  No cough, shortness of breath, PND or orthopnea.  GASTROINTESTINAL:  No nausea, vomiting or diarrhea.  GENITOURINARY:  No dysuria, frequency or urgency. No Blood in urine  MUSCULOSKELETAL:  no joint aches, no muscle pain  SKIN:  No change in skin, hair or nails.  NEUROLOGIC:  No paresthesias, fasciculations, seizures or weakness.  PSYCHIATRIC:  No disorder of thought or mood.  ENDOCRINE:  No heat or cold intolerance, polyuria or polydipsia.  HEMATOLOGICAL:  No easy bruising or bleeding.     Physical Exam:  Vital Signs Last 24 Hrs  T(C): 36.6 (07 Jan 2020 08:15), Max: 36.8 (06 Jan 2020 23:53)  T(F): 97.9 (07 Jan 2020 08:15), Max: 98.2 (06 Jan 2020 23:53)  HR: 72 (07 Jan 2020 08:15) (72 - 131)  BP: 98/54 (07 Jan 2020 08:15) (93/60 - 100/54)  BP(mean): --  RR: 16 (07 Jan 2020 08:15) (16 - 20)  SpO2: 98% (07 Jan 2020 08:15) (96% - 100%)  GEN: NAD  HEENT: normocephalic and atraumatic. EOMI. PERRL.    NECK: Supple.  No lymphadenopathy   LUNGS: Clear to auscultation.  HEART: Regular rate and rhythm without murmur.  ABDOMEN: Soft, nontender, and nondistended.  Positive bowel sounds.    : No CVA tenderness  EXTREMITIES: Without any cyanosis, clubbing, rash, lesions or edema.  NEUROLOGIC: grossly intact.  PSYCHIATRIC: Appropriate affect .  SKIN: dry gangrene on left 2nd toe.     Labs:  01-07    137  |  104  |  71.0<H>  ----------------------------<  166<H>  5.5<H>   |  19.0<L>  |  1.22    Ca    8.5<L>      07 Jan 2020 07:51  Mg     2.5     01-07                            8.1    6.53  )-----------( 91       ( 07 Jan 2020 07:51 )             26.0     RECENT CULTURES:  01-04 @ 01:47 .Blood     No growth at 48 hours    01-04 @ 01:46 .Blood     No growth at 48 hours      RECENT CULTURES:  01-04 @ 01:47 .Blood     No growth at 48 hours    01-04 @ 01:46 .Blood     No growth at 48 hours    All imaging and other data have been reviewed.    Assessment and Plan:   87y/o woman with PMH of HTN, DM, HFrEF that was discharged from [facility] recently after being treated for diabetic foot ulcer/osteo, was readmitted for worsening fatigue, STINSON and palpitations. She has a PICC line and went home on IV ertapenem and linezolid. Since she was not feeling well and a fib was new onset, she was admitted for more work up.   she had toe nailed clipped about 1 month ago. There was incidental clip injury to tip of left 2nd toe.  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   - Weekly labs CBC and BMP   - Vascular follow up as outpatient   - ID follow up weekly after discharge    Will sign off please call with any question.

## 2020-01-07 NOTE — GOALS OF CARE CONVERSATION - ADVANCED CARE PLANNING - CONVERSATION DETAILS
[employer]  used to conduct interview Michael [id number].  Spoke with pt at bedside.  Confused to place and time.  Pt mumbling and unable to answer questions, although when L 2nd  toe is touched she states she has pain. Pt is unable to make clear decisions.  She does not have capacity to make decisions regarding code status.  ID MD in for a portion of the conversation and utilized  as well. Called pts trevor Ceballos.  He states that the pt is DNR and DNI.  He does not want the pt to be placed on life support or have aggressive management that will keep the pt alive artificially.  He and his sister are in agreement.  MOLST form copied and placed in chart.  Copy left for son at bedside. Palliative care will sign off as goals are established.  Please reconsult if necessary.

## 2020-01-07 NOTE — PROGRESS NOTE ADULT - SUBJECTIVE AND OBJECTIVE BOX
Chief Complaint: chart and course reviewed.    HPI: 85 yo F with diabetes/anemia/atrial fib/pacer and osteomyelitis-on broad spectrum antibiotics. Nurses report dark stools today. C/O headache    PAST MEDICAL & SURGICAL HISTORY:  Pacemaker  Type 2 diabetes mellitus with foot ulcer, unspecified whether long term insulin use  No pertinent past medical history  No significant past surgical history      PREVIOUS DIAGNOSTIC TESTING:      ECHO  FINDINGS: noted previously.    STRESS  FINDINGS:    CATHETERIZATION  FINDINGS:    MEDICATIONS  (STANDING):  aspirin enteric coated 81 milliGRAM(s) Oral daily  carvedilol 25 milliGRAM(s) Oral every 12 hours  dextrose 5%. 1000 milliLiter(s) (50 mL/Hr) IV Continuous <Continuous>  dextrose 50% Injectable 12.5 Gram(s) IV Push once  dextrose 50% Injectable 25 Gram(s) IV Push once  dextrose 50% Injectable 25 Gram(s) IV Push once  enoxaparin Injectable 60 milliGRAM(s) SubCutaneous daily  ertapenem  IVPB 1000 milliGRAM(s) IV Intermittent every 24 hours  insulin lispro (HumaLOG) corrective regimen sliding scale   SubCutaneous Before meals and at bedtime  linezolid    Tablet 600 milliGRAM(s) Oral every 12 hours    MEDICATIONS  (PRN):  dextrose 40% Gel 15 Gram(s) Oral once PRN Blood Glucose LESS THAN 70 milliGRAM(s)/deciliter  diltiazem Injectable 10 milliGRAM(s) IV Push every 4 hours PRN prn HR >120/min  glucagon  Injectable 1 milliGRAM(s) IntraMuscular once PRN Glucose LESS THAN 70 milligrams/deciliter      FAMILY HISTORY:  No pertinent family history in first degree relatives      ROS: Negative other than as mentioned in HPI.    Vital Signs Last 24 Hrs  T(C): 36.6 (07 Jan 2020 08:15), Max: 36.8 (06 Jan 2020 23:53)  T(F): 97.9 (07 Jan 2020 08:15), Max: 98.2 (06 Jan 2020 23:53)  HR: 7o irreg (07 Jan 2020 08:15) (72 - 131)  BP: 98/54 no manual cuff available (07 Jan 2020 08:15) (93/60 - 100/54)  BP(mean): --  RR: 14 falt ora (07 Jan 2020 08:15) (16 - 20)  SpO2: 98% (07 Jan 2020 08:15) (96% - 100%)    PHYSICAL EXAM:  General. elderly lethargic arouasable F nad.  HEENT: Head; normocephalic, atraumatic.  Eyes;   Pupils reactive, cornea wnl.  Neck; Supple, no nodes adenopathy, no NVD or carotid bruit or thyromegaly.  CARDIOVASCULAR; No murmur, rub, gallop or lift. Normal S1 and S2. Irreg rhythm  LUNGS; good bs bilat. no wheeeze.  ABDOMEN ; Soft, nontender without mass or organomegaly. bowel sounds normoactive.  EXTREMITIES; No clubbing, cyanosis. 1-2+ pretib  or edema.    SKIN; warm and dry with normal turgor.  NEURO; lethargic but repsonsive   PSYCH; normal affect.            INTERPRETATION OF TELEMETRY:    ECG: afib    I&O's Detail      LABS:                        8.1    6.53  )-----------( 91       ( 07 Jan 2020 07:51 )             26.0     01-07    137  |  104  |  71.0<H>  ----------------------------<  166<H>  5.5<H>   |  19.0<L>  |  1.22    Ca    8.5<L>      07 Jan 2020 07:51  Mg     2.5     01-07      CARDIAC MARKERS ( 07 Jan 2020 07:51 )  x     / 0.09 ng/mL / x     / x     / x              I&O's Summary      RADIOLOGY & ADDITIONAL STUDIES:

## 2020-01-07 NOTE — CONSULT NOTE ADULT - ASSESSMENT
86F hx PPM, HTN, DM, HFrEF recent admit for diabetic foot ulcer/osteo presents with worsening fatigue, STINSON palpitations and anorexia. Pt confused at baseline. In Ed found to be in Afib unable to cardiovert as per cardiology.  ID following for OM.  Palliative care called for GOC.  [employer] interpreters used for translation to complete consult Michael [id number].

## 2020-01-07 NOTE — CONSULT NOTE ADULT - SUBJECTIVE AND OBJECTIVE BOX
HPI:  86F hx HTN, DM, HFrEF recent admit for diabetic foot ulcer/osteo presents with worsening fatigue, STINSON and palpitations. Patient with recently here for foot ulcer and sent home on IV ertapenem and linezolid via midline. For last few days son noticed pt had lack of appetite. Finally admitted to having worsening STINSON and palpitations for last couple of weeks. No chest pain, fevers, chills, cough, orthopnea, dizziness or syncope. No known history of afib. She called PCP Dr. Carbone who recommended to come to hospital.    In ED, pt found to be afib w/ RVR of 135 with bp 129/83, RR- 16, Spo2 98 on RA. Pt given cardizem 10 iv and cardizem 30 PO with good response in heart rate. (03 Jan 2020 23:59)    PERTINENT PM/SXH:   Pacemaker  Type 2 diabetes mellitus with foot ulcer, unspecified whether long term insulin use  No pertinent past medical history    No significant past surgical history    FAMILY HISTORY:  No pertinent family history in first degree relatives    ITEMS NOT CHECKED ARE NOT PRESENT    SOCIAL HISTORY:   Significant other/partner[x ]  Children x2 [ ]  Synagogue/Spirituality:  Substance hx:  [ ]   Tobacco hx:  [ ]   Alcohol hx: [ ]   Home Opioid hx:  [ ] I-Stop Reference No:  Living Situation: [x ]Home  [ ]Long term care  [ ]Rehab [ ]Other    ADVANCE DIRECTIVES:    DNR  Yes  MOLST  [x ] completed 1/7/20    Living Will  [ ]     DECISION MAKER(s):  [ ] Health Care Proxy(s)  [x ] Surrogate(s)  [ ] Guardian           Name(s): Phone Number(s): Marcial Medellin [phone]/ DTR Kathy Arnold (lives in [place])  BASELINE (I)ADL(s) (prior to admission):  [place]: [ ]Total  [x ] Moderate [ ]Dependent    Allergies    No Known Allergies    Intolerances    prefers chocolate glucerna (Unknown)  MEDICATIONS  (STANDING):  aspirin enteric coated 81 milliGRAM(s) Oral daily  carvedilol 25 milliGRAM(s) Oral every 12 hours  dextrose 5%. 1000 milliLiter(s) (50 mL/Hr) IV Continuous <Continuous>  dextrose 50% Injectable 12.5 Gram(s) IV Push once  dextrose 50% Injectable 25 Gram(s) IV Push once  dextrose 50% Injectable 25 Gram(s) IV Push once  enoxaparin Injectable 60 milliGRAM(s) SubCutaneous daily  ertapenem  IVPB 1000 milliGRAM(s) IV Intermittent every 24 hours  insulin lispro (HumaLOG) corrective regimen sliding scale   SubCutaneous Before meals and at bedtime  linezolid    Tablet 600 milliGRAM(s) Oral every 12 hours    MEDICATIONS  (PRN):  dextrose 40% Gel 15 Gram(s) Oral once PRN Blood Glucose LESS THAN 70 milliGRAM(s)/deciliter  diltiazem Injectable 10 milliGRAM(s) IV Push every 4 hours PRN prn HR >120/min  glucagon  Injectable 1 milliGRAM(s) IntraMuscular once PRN Glucose LESS THAN 70 milligrams/deciliter    PRESENT SYMPTOMS: [ ]Unable to obtain due to poor mentation   Source if other than patient:  [ ]Family   [ x]Team     Pain: [x ]yes [ ]no  QOL impact -   Location -      L 2nd toe with palpation                Aggravating factors - movement and palpation  Quality - sore  Radiation - none  Timing-  Severity (0-10 scale):  Minimal acceptable level (0-10 scale):     PAIN AD Score:     [email or web address]/painad.pdf (press ctrl +  left click to view)    Dyspnea:                           [ ]Mild [ ]Moderate [ ]Severe  Anxiety:                             [ ]Mild [ ]Moderate [ ]Severe  Fatigue:                             [ x]Mild [ ]Moderate [ ]Severe  Nausea:                            [ ]Mild [ ]Moderate [ ]Severe  Loss of appetite:               [x ]Mild [ ]Moderate [ ]Severe  Constipation:                    [ ]Mild [ ]Moderate [ ]Severe    Other Symptoms:  [ ]All other review of systems negative     Palliative Performance Status Version 2:    40%   %    http://npcrc.org/files/news/palliative_performance_scale_ppsv2.pdf    PHYSICAL EXAM:  Vital Signs Last 24 Hrs  T(C): 36.6 (07 Jan 2020 08:15), Max: 36.8 (06 Jan 2020 23:53)  T(F): 97.9 (07 Jan 2020 08:15), Max: 98.2 (06 Jan 2020 23:53)  HR: 72 (07 Jan 2020 08:15) (72 - 131)  BP: 98/54 (07 Jan 2020 08:15) (93/60 - 100/54)  BP(mean): --  RR: 16 (07 Jan 2020 08:15) (16 - 20)  SpO2: 98% (07 Jan 2020 08:15) (96% - 100%) I&O's Summary    GENERAL:  [x ]Alert  [x ]Oriented x 1  [ ]Lethargic  [x ]Cachexia  [ ]Unarousable  [ x]Verbal  [ ]Non-Verbal    Behavioral:   [ ] Anxiety  [ ] Delirium [ ] Agitation [x ] Other confused    HEENT:  [x ]Normal   [ ]Dry mouth   [ ]ET Tube/Trach  [ ]Oral lesions    PULMONARY:   [x ]Clear [ ]Tachypnea  [ ]Audible excessive secretions   [ ]Rhonchi        [ ]Right [ ]Left [ ]Bilateral  [ ]Crackles        [ ]Right [ ]Left [ ]Bilateral  [ ]Wheezing     [ ]Right [ ]Left [ ]Bilatera  x[ ]Diminished breath sounds [ ]right [ ]left [x ]bilateral    CARDIOVASCULAR:    [ ]Regular [x ]Irregular [ ]Tachy  [ ]Vishnu [ ]Murmur [ ]Other    GASTROINTESTINAL:  [x ]Soft  [ ]Distended   [ x]+BS  [ x]Non tender [ ]Tender  [ ]PEG [ ]OGT/ NGT  Last BM:     GENITOURINARY:  [x ]Normal [ ] Incontinent   [ ]Oliguria/Anuria   [ ]Fang    [ ]Esternal cath    MUSCULOSKELETAL:   [ ]Normal   [ x]Weakness  [x ]Bed/Wheelchair bound [ ]Edema    NEUROLOGIC:   [ ]No focal deficits  [x ]Cognitive impairment  [ ]Dysphagia [ ]Dysarthria [ ]Paresis [ ]Other     SKIN:   [ ]Normal    [ ]Rash  [ ]Pressure ulcer(s)        L 2nd toe OM Present on admission [x ]y [ ]n    CRITICAL CARE:  [ ]Shock Present  [ ]Septic [ ]Cardiogenic [ ]Neurologic [ ]Hypovolemic  [ ]  Vasopressors [ ]  Inotropes   [ ]Respiratory failure present [ ]Mechanical ventilation [ ]Non-invasive ventilatory support [ ]High flow  [ ]Acute  [ ]Chronic [ ]Hypoxic  [ ]Hypercarbic [ ]Other  [ ]Other organ failure     LABS:                        8.1    6.53  )-----------( 91       ( 07 Jan 2020 07:51 )             26.0   01-07    137  |  104  |  71.0<H>  ----------------------------<  166<H>  5.5<H>   |  19.0<L>  |  1.22    Ca    8.5<L>      07 Jan 2020 07:51  Mg     2.5     01-07          RADIOLOGY & ADDITIONAL STUDIES:    PROTEIN CALORIE MALNUTRITION PRESENT: [ ]mild [ ]moderate [ ]severe [ ]underweight [ ]morbid obesity  https://www.andeal.org/vault/2440/web/files/ONC/Table_Clinical%20Characteristics%20to%20Document%20Malnutrition-White%20JV%20et%20al%202012.pdf    Height (cm): 162.56 (01-03-20 @ 16:51), 134.6 (12-20-19 @ 21:16), 160.02 (10-01-19 @ 10:26)  Weight (kg): 54.482320446025692 (01-03-20 @ 16:51), 50.1 (12-20-19 @ 21:16), 48.1 (10-01-19 @ 10:26)  BMI (kg/m2): 20.6 (01-03-20 @ 16:51), 27.7 (12-20-19 @ 21:16), 18.8 (10-01-19 @ 10:26)    [ ]PPSV2 < or = to 30% [ ]significant weight loss  [ ]poor nutritional intake  [ ]anasarca     Albumin, Serum: 3.7 g/dL (01-03-20 @ 17:36)   [ ]Artificial Nutrition      REFERRALS:   [ ]Chaplaincy  [ ]Hospice  [ ]Child Life  [ ]Social Work  [ ]Case management [ ]Holistic Therapy     Goals of Care Document:

## 2020-01-07 NOTE — CONSULT NOTE ADULT - PROBLEM SELECTOR RECOMMENDATION 6
[employer]  used to conduct interview Michael [id number].  Spoke with pt at bedside.  Confused to place and time.  Pt mumbling and unable to answer questions.  Pt is unable to make clear decisions.  She does not have capacity to make decisions regarding code status.  ID MD in for a portion of the conversation and utilized  as well. Called pts son Tucker.  He states that the pt is DNR and DNI.  He does not want the pt to be placed on life support or have aggressive management  that will keep the pt alive artificially.  He and his sister are in agreement.  MOLST form copied and placed in chart.  Copy left for son at bedside. Palliative care will sign off as goals are established.  Please reconsult if necessary.

## 2020-01-08 DIAGNOSIS — E43 UNSPECIFIED SEVERE PROTEIN-CALORIE MALNUTRITION: ICD-10-CM

## 2020-01-08 DIAGNOSIS — K92.2 GASTROINTESTINAL HEMORRHAGE, UNSPECIFIED: ICD-10-CM

## 2020-01-08 LAB
ANION GAP SERPL CALC-SCNC: 13 MMOL/L — SIGNIFICANT CHANGE UP (ref 5–17)
BLD GP AB SCN SERPL QL: SIGNIFICANT CHANGE UP
BUN SERPL-MCNC: 61 MG/DL — HIGH (ref 8–20)
CALCIUM SERPL-MCNC: 8.7 MG/DL — SIGNIFICANT CHANGE UP (ref 8.6–10.2)
CHLORIDE SERPL-SCNC: 103 MMOL/L — SIGNIFICANT CHANGE UP (ref 98–107)
CO2 SERPL-SCNC: 20 MMOL/L — LOW (ref 22–29)
CREAT SERPL-MCNC: 1.03 MG/DL — SIGNIFICANT CHANGE UP (ref 0.5–1.3)
GLUCOSE BLDC GLUCOMTR-MCNC: 140 MG/DL — HIGH (ref 70–99)
GLUCOSE BLDC GLUCOMTR-MCNC: 149 MG/DL — HIGH (ref 70–99)
GLUCOSE BLDC GLUCOMTR-MCNC: 151 MG/DL — HIGH (ref 70–99)
GLUCOSE BLDC GLUCOMTR-MCNC: 153 MG/DL — HIGH (ref 70–99)
GLUCOSE BLDC GLUCOMTR-MCNC: 174 MG/DL — HIGH (ref 70–99)
GLUCOSE SERPL-MCNC: 141 MG/DL — HIGH (ref 70–115)
HCT VFR BLD CALC: 26.8 % — LOW (ref 34.5–45)
HGB BLD-MCNC: 8.6 G/DL — LOW (ref 11.5–15.5)
MAGNESIUM SERPL-MCNC: 2.4 MG/DL — SIGNIFICANT CHANGE UP (ref 1.6–2.6)
MCHC RBC-ENTMCNC: 31.2 PG — SIGNIFICANT CHANGE UP (ref 27–34)
MCHC RBC-ENTMCNC: 32.1 GM/DL — SIGNIFICANT CHANGE UP (ref 32–36)
MCV RBC AUTO: 97.1 FL — SIGNIFICANT CHANGE UP (ref 80–100)
OB PNL STL: POSITIVE
PLATELET # BLD AUTO: 88 K/UL — LOW (ref 150–400)
POTASSIUM SERPL-MCNC: 5.6 MMOL/L — HIGH (ref 3.5–5.3)
POTASSIUM SERPL-SCNC: 5.6 MMOL/L — HIGH (ref 3.5–5.3)
RBC # BLD: 2.76 M/UL — LOW (ref 3.8–5.2)
RBC # FLD: 14.6 % — HIGH (ref 10.3–14.5)
SODIUM SERPL-SCNC: 136 MMOL/L — SIGNIFICANT CHANGE UP (ref 135–145)
WBC # BLD: 8.7 K/UL — SIGNIFICANT CHANGE UP (ref 3.8–10.5)
WBC # FLD AUTO: 8.7 K/UL — SIGNIFICANT CHANGE UP (ref 3.8–10.5)

## 2020-01-08 PROCEDURE — 99233 SBSQ HOSP IP/OBS HIGH 50: CPT

## 2020-01-08 PROCEDURE — 99223 1ST HOSP IP/OBS HIGH 75: CPT

## 2020-01-08 RX ORDER — SODIUM POLYSTYRENE SULFONATE 4.1 MEQ/G
30 POWDER, FOR SUSPENSION ORAL ONCE
Refills: 0 | Status: COMPLETED | OUTPATIENT
Start: 2020-01-08 | End: 2020-01-08

## 2020-01-08 RX ORDER — PANTOPRAZOLE SODIUM 20 MG/1
40 TABLET, DELAYED RELEASE ORAL EVERY 12 HOURS
Refills: 0 | Status: DISCONTINUED | OUTPATIENT
Start: 2020-01-08 | End: 2020-01-10

## 2020-01-08 RX ADMIN — Medication 1: at 17:40

## 2020-01-08 RX ADMIN — SODIUM POLYSTYRENE SULFONATE 30 GRAM(S): 4.1 POWDER, FOR SUSPENSION ORAL at 09:35

## 2020-01-08 RX ADMIN — PANTOPRAZOLE SODIUM 40 MILLIGRAM(S): 20 TABLET, DELAYED RELEASE ORAL at 17:40

## 2020-01-08 RX ADMIN — LINEZOLID 600 MILLIGRAM(S): 600 INJECTION, SOLUTION INTRAVENOUS at 17:40

## 2020-01-08 RX ADMIN — Medication 81 MILLIGRAM(S): at 12:55

## 2020-01-08 RX ADMIN — CARVEDILOL PHOSPHATE 25 MILLIGRAM(S): 80 CAPSULE, EXTENDED RELEASE ORAL at 06:23

## 2020-01-08 RX ADMIN — CARVEDILOL PHOSPHATE 25 MILLIGRAM(S): 80 CAPSULE, EXTENDED RELEASE ORAL at 17:40

## 2020-01-08 RX ADMIN — LINEZOLID 600 MILLIGRAM(S): 600 INJECTION, SOLUTION INTRAVENOUS at 06:22

## 2020-01-08 RX ADMIN — Medication 1: at 12:46

## 2020-01-08 NOTE — CHART NOTE - NSCHARTNOTEFT_GEN_A_CORE
Upon Nutritional Assessment by the Registered Dietitian your patient was determined to meet criteria / has evidence of the following diagnosis/diagnoses:             [x ] Severe Protein Calorie Malnutrition         Findings as based on:  •  Comprehensive nutrition assessment and consultation  •  Calorie counts (nutrient intake analysis)  •  Food acceptance and intake status from observations by staff  •  Follow up  •  Patient education  •  Intervention secondary to interdisciplinary rounds  •   concerns      Treatment:    The following diet has been recommended:  Continue with Glucerna tid    PROVIDER Section:     By signing this assessment you are acknowledging and agree with the diagnosis/diagnoses assigned by the Registered Dietitian    Comments:

## 2020-01-08 NOTE — CONSULT NOTE ADULT - SUBJECTIVE AND OBJECTIVE BOX
HISTORY OF PRESENT ILLNESS: This is an 86y old woman with a past medical history significant for HFrEF (LVEF 30-35%), DM, HTN, and OM who presented with worsening STINSON and palpitations.  Patient with recently here for foot ulcer and sent home on IV ertapenem and linezolid via midline.  For the few days prior to her readmission, her son noticed the patient had lack of appetite.  No chest pain, fevers, chills, cough, orthopnea, dizziness or syncope. No known history of afib.  She called PCP Dr. Carbone who recommended to come to hospital.  In ED, pt found to be afib w/ RVR of 135 with bp 129/83, RR- 16, Spo2 98 on RA. Pt given cardizem 10 iv and cardizem 30 PO with good response in heart rate. Full dose anticoagulation was initiated and cardiology evaluation was requested.  Cardiology advised increasing Coreg and continuing Lovenox.  Lisinopril was added. Cardiology advised MIRTHA and cardioversion.  Noted to have mild RASHAAD, lisinopril was held.  Initially planned for DCCV, later advised rate control only.  PT and palliative care consulted.  Palliative care was consulted, patient made DNR/DNI.  Given anemia and black stools, FOBT was ordered, which was positive, prompting GI consultation.    ROS: A 14-point review of systems was completed and was otherwise negative save what was reported in the HPI.    PAST MEDICAL/SURGICAL HISTORY:  Pacemaker  Type 2 diabetes mellitus with foot ulcer, unspecified whether long term insulin use  No pertinent past medical history  No significant past surgical history    SOCIAL HISTORY:  - TOBACCO: Denies  - ALCOHOL: Denies  - ILLICIT DRUG USE: Denies    FAMILY HISTORY:  No known history of gastrointestinal or liver disease;  No pertinent family history in first degree relatives      HOME MEDICATIONS:  alendronate 70 mg oral tablet: 1 tab(s) orally once a week (27 Dec 2019 13:57)  Coreg 12.5 mg oral tablet: 1 tab(s) orally 2 times a day (27 Dec 2019 13:57)  ertapenem 1 g injection: 1 gram(s) injectable once a day (27 Dec 2019 14:01)  ferrous sulfate 325 mg (65 mg elemental iron) oral tablet: 1 tab(s) orally once a day (27 Dec 2019 13:57)    INPATIENT MEDICATIONS:  MEDICATIONS  (STANDING):  aspirin enteric coated 81 milliGRAM(s) Oral daily  carvedilol 25 milliGRAM(s) Oral every 12 hours  dextrose 5%. 1000 milliLiter(s) (50 mL/Hr) IV Continuous <Continuous>  dextrose 50% Injectable 12.5 Gram(s) IV Push once  dextrose 50% Injectable 25 Gram(s) IV Push once  dextrose 50% Injectable 25 Gram(s) IV Push once  enoxaparin Injectable 60 milliGRAM(s) SubCutaneous daily  insulin lispro (HumaLOG) corrective regimen sliding scale   SubCutaneous Before meals and at bedtime  levoFLOXacin  Tablet 250 milliGRAM(s) Oral every 24 hours  linezolid    Tablet 600 milliGRAM(s) Oral every 12 hours    MEDICATIONS  (PRN):  dextrose 40% Gel 15 Gram(s) Oral once PRN Blood Glucose LESS THAN 70 milliGRAM(s)/deciliter  diltiazem Injectable 10 milliGRAM(s) IV Push every 4 hours PRN prn HR >120/min  glucagon  Injectable 1 milliGRAM(s) IntraMuscular once PRN Glucose LESS THAN 70 milligrams/deciliter    ALLERGIES:  No Known Allergies    T(C): 36.3 (01-08-20 @ 16:21), Max: 36.6 (01-08-20 @ 06:18)  HR: 84 (01-08-20 @ 16:21) (65 - 95)  BP: 95/49 (01-08-20 @ 16:21) (95/49 - 110/60)  RR: 18 (01-08-20 @ 16:21) (16 - 18)  SpO2: 93% (01-08-20 @ 16:21) (93% - 98%)    01-07-20 @ 07:01  -  01-08-20 @ 07:00  --------------------------------------------------------  IN: 240 mL / OUT: 0 mL / NET: 240 mL        PHYSICAL EXAM:  Constitutional: Thin, frail, elderly  woman in no apparent distress  Eyes: Sclerae anicteric, conjunctivae pale  ENMT: Mucus membranes moist, no oropharyngeal thrush noted  Neck: No thyroid nodules appreciated, no significant cervical or supraclavicular lymphadenopathy  Respiratory: Breathing nonlabored; clear to auscultation; decreased breath sounds at bases  Cardiovascular: Irregularly irregular  Gastrointestinal: Soft, nontender, nondistended, normoactive bowel sounds; no hepatosplenomegaly appreciated; no rebound tenderness or involuntary guarding  Rectal: Perianal exam within normal limits; normal sphincter tone; black (melenic) stool on glove  Extremities: No clubbing, cyanosis or edema  Neurological: Alert and oriented to person, place and time; no asterixis  Skin: No jaundice  Lymph Nodes: No significant lymphadenopathy  Musculoskeletal: No significant peripheral atrophy  Psychiatric: Affect and mood appropriate      LABS:             8.6    8.70  )-----------( 88       ( 01-08 @ 06:15 )             26.8                8.1    6.53  )-----------( 91       ( 01-07 @ 07:51 )             26.0                9.5    7.77  )-----------( 111      ( 01-06 @ 07:54 )             29.6         01-08    136  |  103  |  61.0<H>  ----------------------------<  141<H>  5.6<H>   |  20.0<L>  |  1.03    Ca    8.7      08 Jan 2020 06:15  Mg     2.4     01-08

## 2020-01-08 NOTE — DIETITIAN INITIAL EVALUATION ADULT. - ETIOLOGY
related to inability to consume sufficient protein energy intake with decreased appetite in setting of osteomyelitis and recent fatigue and worsening dyspnea

## 2020-01-08 NOTE — CONSULT NOTE ADULT - ASSESSMENT
Frail, chronically ill 86-year-old woman with epigastric pain and melena.  Will plan on EGD (cleared by cardiology), hold Lovenox, start PPI.  NPO p MN for EGD tomorrow.    Thank you for the consult.  Please do not hesitate to call with any questions or concerns.    GABRIELA Toure MD  [facility]  Division of Gastroenterology  Tel [phone]  Fax [phone]  01-08-20 @ 17:18

## 2020-01-08 NOTE — DIETITIAN INITIAL EVALUATION ADULT. - MALNUTRITION
NFPE performed- severe muscle wasting at temple, clavicle, shoulder.  Moderate fat loss in orbital region, buccal pads. severe (chronic)

## 2020-01-08 NOTE — PROGRESS NOTE ADULT - PROBLEM SELECTOR PLAN 8
Discussed with son, he is wanting to pursue additional work up to determine cause of bleeding. GI evaluation was requested. Plan for EGD in am. Lovenox to be held. Monitor Hb. Discussed with Dr Hall, advised patient is optimized from cardiac standpoint for EGD in am. NPO post MN.

## 2020-01-08 NOTE — PROGRESS NOTE ADULT - SUBJECTIVE AND OBJECTIVE BOX
[place] [facility]ARDIOVASCULAR - [facility], THE HEART CENTER                                   [address] [place] [address]                                                      PHONE: [phone]                                                         FAX: [phone]  [email or web address]  ---------------------------------------------------------------------------------------------------------------------------------    Overnight events/patient complaints:    INTERPRETATION OF TELEMETRY (personally reviewed):    ECG:    ECHO:    STRESS TEST:    CARDIAC CATHETERIZATION:    I&O's Summary    07 Jan 2020 07:01  -  08 Jan 2020 07:00  --------------------------------------------------------  IN: 240 mL / OUT: 0 mL / NET: 240 mL        PAST MEDICAL & SURGICAL HISTORY:  Pacemaker  Type 2 diabetes mellitus with foot ulcer, unspecified whether long term insulin use  No pertinent past medical history  No significant past surgical history      chocolate glucerna (Unknown)  No Known Allergies    MEDICATIONS  (STANDING):  aspirin enteric coated 81 milliGRAM(s) Oral daily  carvedilol 25 milliGRAM(s) Oral every 12 hours  dextrose 5%. 1000 milliLiter(s) (50 mL/Hr) IV Continuous <Continuous>  dextrose 50% Injectable 12.5 Gram(s) IV Push once  dextrose 50% Injectable 25 Gram(s) IV Push once  dextrose 50% Injectable 25 Gram(s) IV Push once  enoxaparin Injectable 60 milliGRAM(s) SubCutaneous daily  insulin lispro (HumaLOG) corrective regimen sliding scale   SubCutaneous Before meals and at bedtime  levoFLOXacin  Tablet 250 milliGRAM(s) Oral every 24 hours  linezolid    Tablet 600 milliGRAM(s) Oral every 12 hours    MEDICATIONS  (PRN):  dextrose 40% Gel 15 Gram(s) Oral once PRN Blood Glucose LESS THAN 70 milliGRAM(s)/deciliter  diltiazem Injectable 10 milliGRAM(s) IV Push every 4 hours PRN prn HR >120/min  glucagon  Injectable 1 milliGRAM(s) IntraMuscular once PRN Glucose LESS THAN 70 milligrams/deciliter      Vital Signs Last 24 Hrs  T(C): 36.4 (08 Jan 2020 10:00), Max: 36.6 (08 Jan 2020 06:18)  T(F): 97.5 (08 Jan 2020 10:00), Max: 97.9 (08 Jan 2020 06:18)  HR: 95 (08 Jan 2020 10:00) (65 - 95)  BP: 102/60 (08 Jan 2020 10:00) (102/60 - 118/70)  BP(mean): --  RR: 16 (08 Jan 2020 10:00) (16 - 18)  SpO2: 98% (08 Jan 2020 10:00) (96% - 98%)  ICU Vital Signs Last 24 Hrs    PHYSICAL EXAM:  General: Appears well developed, well nourished alert and cooperative.  HEENT: Head; normocephalic, atraumatic.Pupils reactive, cornea wnl. Neck supple, no nodes adenopathy, no JVD  CARDIOVASCULAR: Normal S1 and S2, 1/6 GERALDO, no rub, gallop or lift.   LUNGS: No rales, rhonchi or wheeze. Normal breath sounds bilaterally.  ABDOMEN: Soft, nontender without mass or organomegaly. bowel sounds normoactive.  EXTREMITIES: No clubbing, cyanosis or edema.   SKIN: warm and dry with normal turgor.  NEURO: Alert/oriented x 3/normal motor exam.   PSYCH: normal affect.        LABS:                        8.6    8.70  )-----------( 88       ( 08 Jan 2020 06:15 )             26.8     01-08    136  |  103  |  61.0<H>  ----------------------------<  141<H>  5.6<H>   |  20.0<L>  |  1.03    Ca    8.7      08 Jan 2020 06:15  Mg     2.4     01-08      BLANCA MONTERROSO  CARDIAC MARKERS ( 07 Jan 2020 07:51 )  x     / 0.09 ng/mL / x     / x     / x                RADIOLOGY & ADDITIONAL STUDIES:    ASSESSMENT AND PLAN:  In summary, BLANCA MONTERROSO is a 86y Female with past medical history significant for     Thank you for allowing [facility] to participate in the care of this patient.  Please feel free to call with any questions or concerns. [place] [facility] - [facility], THE HEART CENTER                                   [address] [place] [address]                                                      PHONE: [phone]                                                         FAX: [phone]  [email or web address]  ---------------------------------------------------------------------------------------------------------------------------------    Overnight events/patient complaints: confused     INTERPRETATION OF TELEMETRY (personally reviewed)    ECG:    ECHO:    STRESS TEST:    CARDIAC CATHETERIZATION:    I&O's Summary    07 Jan 2020 07:01  -  08 Jan 2020 07:00  --------------------------------------------------------  IN: 240 mL / OUT: 0 mL / NET: 240 mL        PAST MEDICAL & SURGICAL HISTORY:  Pacemaker  Type 2 diabetes mellitus with foot ulcer, unspecified whether long term insulin use  No pertinent past medical history  No significant past surgical history      chocolate glucerna (Unknown)  No Known Allergies    MEDICATIONS  (STANDING):  aspirin enteric coated 81 milliGRAM(s) Oral daily  carvedilol 25 milliGRAM(s) Oral every 12 hours  dextrose 5%. 1000 milliLiter(s) (50 mL/Hr) IV Continuous <Continuous>  dextrose 50% Injectable 12.5 Gram(s) IV Push once  dextrose 50% Injectable 25 Gram(s) IV Push once  dextrose 50% Injectable 25 Gram(s) IV Push once  enoxaparin Injectable 60 milliGRAM(s) SubCutaneous daily  insulin lispro (HumaLOG) corrective regimen sliding scale   SubCutaneous Before meals and at bedtime  levoFLOXacin  Tablet 250 milliGRAM(s) Oral every 24 hours  linezolid    Tablet 600 milliGRAM(s) Oral every 12 hours    MEDICATIONS  (PRN):  dextrose 40% Gel 15 Gram(s) Oral once PRN Blood Glucose LESS THAN 70 milliGRAM(s)/deciliter  diltiazem Injectable 10 milliGRAM(s) IV Push every 4 hours PRN prn HR >120/min  glucagon  Injectable 1 milliGRAM(s) IntraMuscular once PRN Glucose LESS THAN 70 milligrams/deciliter      Vital Signs Last 24 Hrs  T(C): 36.4 (08 Jan 2020 10:00), Max: 36.6 (08 Jan 2020 06:18)  T(F): 97.5 (08 Jan 2020 10:00), Max: 97.9 (08 Jan 2020 06:18)  HR: 95 (08 Jan 2020 10:00) (65 - 95)  BP: 102/60 (08 Jan 2020 10:00) (102/60 - 118/70)  BP(mean): --  RR: 16 (08 Jan 2020 10:00) (16 - 18)  SpO2: 98% (08 Jan 2020 10:00) (96% - 98%)  ICU Vital Signs Last 24 Hrs    PHYSICAL EXAM:  General: Appears well developed, well nourished alert and cooperative.  HEENT: Head; normocephalic, atraumatic.Pupils reactive, cornea wnl. Neck supple, no nodes adenopathy, no JVD  CARDIOVASCULAR: Normal S1 and S2, 1/6 GERALDO, no rub, gallop or lift.   LUNGS: No rales, rhonchi or wheeze. Normal breath sounds bilaterally.  ABDOMEN: Soft, nontender without mass or organomegaly. bowel sounds normoactive.  EXTREMITIES: No clubbing, cyanosis or edema.   SKIN: warm and dry with normal turgor.  NEURO: Alert/oriented x 1/normal motor exam.   PSYCH: confused        LABS:                        8.6    8.70  )-----------( 88       ( 08 Jan 2020 06:15 )             26.8     01-08    136  |  103  |  61.0<H>  ----------------------------<  141<H>  5.6<H>   |  20.0<L>  |  1.03    Ca    8.7      08 Jan 2020 06:15  Mg     2.4     01-08      BLANCA MONTERROSO  CARDIAC MARKERS ( 07 Jan 2020 07:51 )  x     / 0.09 ng/mL / x     / x     / x          RADIOLOGY & ADDITIONAL STUDIES:    ASSESSMENT AND PLAN:  In summary, BLANCA MONTERROSO is a 86y Female with past medical history significant for chronic systolic CHF and valvular disease, DM2, discharged from [facility] recently with PICC line in place after being treated for diabetic foot ulcer/osteo, was readmitted for worsening fatigue, STINSON and palpitations- suspect atrial flutter/atrial tachy. Patient additionally with elevated troponin likely 2/2 type II MI in the setting of her tachyarrhythmia     Atrial arrhythmia   [id number] >>2  - no systemic AC, given report of melenic stool and downtrending H/H    - continue coreg at present dose      Chronic HFrEF  - continue coreg, no ACEi/ARB/ARNI given her hyperkalemia   - PRN diuretics  - continue to monitor renal indices     Left 2nd toe dry gangrene/diabetic foot infection  - glycemic control   - continue antibiotics per ID/primary team       Thank you for allowing [facility] to participate in the care of this patient.  Please feel free to call with any questions or concerns.

## 2020-01-08 NOTE — DIETITIAN INITIAL EVALUATION ADULT. - PERTINENT LABORATORY DATA
01-08 Na136 mmol/L Glu 141 mg/dL<H> K+ 5.6 mmol/L<H> Cr  1.03 mg/dL BUN 61.0 mg/dL<H> Phos n/a   Alb n/a   PAB n/a

## 2020-01-08 NOTE — PROGRESS NOTE ADULT - ATTENDING COMMENTS
Discussed with son and RN plan of care.  Son confirms DNR/DNI status, wants patient to be placed in RAHUL. Social work to follow. Discussed with son, GI, cardiology and RN plan of care.

## 2020-01-08 NOTE — DIETITIAN INITIAL EVALUATION ADULT. - PROBLEM SELECTOR PLAN 1
new afib  given elevated armida vasc will start on full dose lovenox  c/w carvedilol 12.5 bid, titrate as needed  will consult cardiology ([place])

## 2020-01-08 NOTE — PROGRESS NOTE ADULT - SUBJECTIVE AND OBJECTIVE BOX
INTERVAL HPI/OVERNIGHT EVENTS:    CC;  hyperkalemia, atrial fibrillation with RVR, diabetes mellitus with osteomyelitis of left 2nd toe, systolic CHF    No overnight events, denies complaints, son at bedside. Explained plan of care. Son wishes to focus on quality of life for patient. He would want her to be in a RAHUL.      Vital Signs Last 24 Hrs  T(C): 36.4 (08 Jan 2020 10:00), Max: 36.6 (08 Jan 2020 06:18)  T(F): 97.5 (08 Jan 2020 10:00), Max: 97.9 (08 Jan 2020 06:18)  HR: 95 (08 Jan 2020 10:00) (65 - 95)  BP: 102/60 (08 Jan 2020 10:00) (102/60 - 118/70)  BP(mean): --  RR: 16 (08 Jan 2020 10:00) (16 - 18)  SpO2: 98% (08 Jan 2020 10:00) (96% - 98%)    PHYSICAL EXAM:    GENERAL: Not in distress, alert  CHEST/LUNG: b/l air entry  HEART: Regular  ABDOMEN: Soft,BS+  EXTREMITIES:  no edema, tenderness    MEDICATIONS  (STANDING):  aspirin enteric coated 81 milliGRAM(s) Oral daily  carvedilol 25 milliGRAM(s) Oral every 12 hours  dextrose 5%. 1000 milliLiter(s) (50 mL/Hr) IV Continuous <Continuous>  dextrose 50% Injectable 12.5 Gram(s) IV Push once  dextrose 50% Injectable 25 Gram(s) IV Push once  dextrose 50% Injectable 25 Gram(s) IV Push once  enoxaparin Injectable 60 milliGRAM(s) SubCutaneous daily  insulin lispro (HumaLOG) corrective regimen sliding scale   SubCutaneous Before meals and at bedtime  levoFLOXacin  Tablet 250 milliGRAM(s) Oral every 24 hours  linezolid    Tablet 600 milliGRAM(s) Oral every 12 hours    MEDICATIONS  (PRN):  dextrose 40% Gel 15 Gram(s) Oral once PRN Blood Glucose LESS THAN 70 milliGRAM(s)/deciliter  diltiazem Injectable 10 milliGRAM(s) IV Push every 4 hours PRN prn HR >120/min  glucagon  Injectable 1 milliGRAM(s) IntraMuscular once PRN Glucose LESS THAN 70 milligrams/deciliter      Allergies    No Known Allergies    Intolerances    chocolate glucerna (Unknown)        LABS:                          8.6    8.70  )-----------( 88       ( 08 Jan 2020 06:15 )             26.8     01-08    136  |  103  |  61.0<H>  ----------------------------<  141<H>  5.6<H>   |  20.0<L>  |  1.03    Ca    8.7      08 Jan 2020 06:15  Mg     2.4     01-08            RADIOLOGY & ADDITIONAL TESTS:

## 2020-01-09 LAB
ANION GAP SERPL CALC-SCNC: 14 MMOL/L — SIGNIFICANT CHANGE UP (ref 5–17)
ANION GAP SERPL CALC-SCNC: 20 MMOL/L — HIGH (ref 5–17)
BLD GP AB SCN SERPL QL: SIGNIFICANT CHANGE UP
BUN SERPL-MCNC: 70 MG/DL — HIGH (ref 8–20)
BUN SERPL-MCNC: 77 MG/DL — HIGH (ref 8–20)
CALCIUM SERPL-MCNC: 8.5 MG/DL — LOW (ref 8.6–10.2)
CALCIUM SERPL-MCNC: 8.8 MG/DL — SIGNIFICANT CHANGE UP (ref 8.6–10.2)
CHLORIDE SERPL-SCNC: 100 MMOL/L — SIGNIFICANT CHANGE UP (ref 98–107)
CHLORIDE SERPL-SCNC: 97 MMOL/L — LOW (ref 98–107)
CO2 SERPL-SCNC: 16 MMOL/L — LOW (ref 22–29)
CO2 SERPL-SCNC: 19 MMOL/L — LOW (ref 22–29)
CREAT SERPL-MCNC: 1.2 MG/DL — SIGNIFICANT CHANGE UP (ref 0.5–1.3)
CREAT SERPL-MCNC: 1.45 MG/DL — HIGH (ref 0.5–1.3)
CULTURE RESULTS: SIGNIFICANT CHANGE UP
CULTURE RESULTS: SIGNIFICANT CHANGE UP
GLUCOSE BLDC GLUCOMTR-MCNC: 143 MG/DL — HIGH (ref 70–99)
GLUCOSE BLDC GLUCOMTR-MCNC: 158 MG/DL — HIGH (ref 70–99)
GLUCOSE BLDC GLUCOMTR-MCNC: 160 MG/DL — HIGH (ref 70–99)
GLUCOSE BLDC GLUCOMTR-MCNC: 187 MG/DL — HIGH (ref 70–99)
GLUCOSE SERPL-MCNC: 156 MG/DL — HIGH (ref 70–115)
GLUCOSE SERPL-MCNC: 164 MG/DL — HIGH (ref 70–115)
HCT VFR BLD CALC: 25.1 % — LOW (ref 34.5–45)
HGB BLD-MCNC: 8.1 G/DL — LOW (ref 11.5–15.5)
MCHC RBC-ENTMCNC: 31.8 PG — SIGNIFICANT CHANGE UP (ref 27–34)
MCHC RBC-ENTMCNC: 32.3 GM/DL — SIGNIFICANT CHANGE UP (ref 32–36)
MCV RBC AUTO: 98.4 FL — SIGNIFICANT CHANGE UP (ref 80–100)
PLATELET # BLD AUTO: 80 K/UL — LOW (ref 150–400)
POTASSIUM SERPL-MCNC: 5.6 MMOL/L — HIGH (ref 3.5–5.3)
POTASSIUM SERPL-MCNC: 5.8 MMOL/L — HIGH (ref 3.5–5.3)
POTASSIUM SERPL-SCNC: 5.6 MMOL/L — HIGH (ref 3.5–5.3)
POTASSIUM SERPL-SCNC: 5.8 MMOL/L — HIGH (ref 3.5–5.3)
RBC # BLD: 2.55 M/UL — LOW (ref 3.8–5.2)
RBC # FLD: 14.5 % — SIGNIFICANT CHANGE UP (ref 10.3–14.5)
SODIUM SERPL-SCNC: 133 MMOL/L — LOW (ref 135–145)
SODIUM SERPL-SCNC: 133 MMOL/L — LOW (ref 135–145)
SPECIMEN SOURCE: SIGNIFICANT CHANGE UP
SPECIMEN SOURCE: SIGNIFICANT CHANGE UP
WBC # BLD: 7.64 K/UL — SIGNIFICANT CHANGE UP (ref 3.8–10.5)
WBC # FLD AUTO: 7.64 K/UL — SIGNIFICANT CHANGE UP (ref 3.8–10.5)

## 2020-01-09 PROCEDURE — 99233 SBSQ HOSP IP/OBS HIGH 50: CPT

## 2020-01-09 RX ORDER — ALBUMIN HUMAN 25 %
50 VIAL (ML) INTRAVENOUS EVERY 12 HOURS
Refills: 0 | Status: DISCONTINUED | OUTPATIENT
Start: 2020-01-09 | End: 2020-01-10

## 2020-01-09 RX ORDER — SODIUM BICARBONATE 1 MEQ/ML
1300 SYRINGE (ML) INTRAVENOUS THREE TIMES A DAY
Refills: 0 | Status: DISCONTINUED | OUTPATIENT
Start: 2020-01-09 | End: 2020-01-10

## 2020-01-09 RX ORDER — FUROSEMIDE 40 MG
40 TABLET ORAL EVERY 12 HOURS
Refills: 0 | Status: DISCONTINUED | OUTPATIENT
Start: 2020-01-09 | End: 2020-01-10

## 2020-01-09 RX ORDER — SODIUM POLYSTYRENE SULFONATE 4.1 MEQ/G
30 POWDER, FOR SUSPENSION ORAL ONCE
Refills: 0 | Status: COMPLETED | OUTPATIENT
Start: 2020-01-09 | End: 2020-01-09

## 2020-01-09 RX ORDER — DILTIAZEM HCL 120 MG
30 CAPSULE, EXT RELEASE 24 HR ORAL EVERY 8 HOURS
Refills: 0 | Status: DISCONTINUED | OUTPATIENT
Start: 2020-01-09 | End: 2020-01-10

## 2020-01-09 RX ADMIN — LINEZOLID 600 MILLIGRAM(S): 600 INJECTION, SOLUTION INTRAVENOUS at 17:30

## 2020-01-09 RX ADMIN — Medication 81 MILLIGRAM(S): at 17:30

## 2020-01-09 RX ADMIN — Medication 1300 MILLIGRAM(S): at 23:05

## 2020-01-09 RX ADMIN — CARVEDILOL PHOSPHATE 25 MILLIGRAM(S): 80 CAPSULE, EXTENDED RELEASE ORAL at 17:30

## 2020-01-09 RX ADMIN — Medication 1: at 23:07

## 2020-01-09 RX ADMIN — PANTOPRAZOLE SODIUM 40 MILLIGRAM(S): 20 TABLET, DELAYED RELEASE ORAL at 05:38

## 2020-01-09 RX ADMIN — LINEZOLID 600 MILLIGRAM(S): 600 INJECTION, SOLUTION INTRAVENOUS at 05:38

## 2020-01-09 RX ADMIN — Medication 50 MILLILITER(S): at 23:06

## 2020-01-09 RX ADMIN — SODIUM POLYSTYRENE SULFONATE 30 GRAM(S): 4.1 POWDER, FOR SUSPENSION ORAL at 17:32

## 2020-01-09 RX ADMIN — PANTOPRAZOLE SODIUM 40 MILLIGRAM(S): 20 TABLET, DELAYED RELEASE ORAL at 17:30

## 2020-01-09 NOTE — CONSULT NOTE ADULT - SUBJECTIVE AND OBJECTIVE BOX
Patient is a 86y old  Female who presents with a chief complaint of new afib (2020 15:53)   Acute  renal failure.    HPI:  86F hx HTN, DM, HFrEF recent admit for diabetic foot ulcer/osteo presents with worsening fatigue, STINSON and palpitations. Patient with recently here for foot ulcer and sent home on IV ertapenem and linezolid via midline. For last few days son noticed pt had lack of appetite. Finally admitted to having worsening STINSON and palpitations for last couple of weeks. No chest pain, fevers, chills, cough, orthopnea, dizziness or syncope. No known history of afib. She called PCP Dr. Carbone who recommended to come to hospital.    In ED, pt found to be afib w/ RVR of 135 with bp 129/83, RR- 16, Spo2 98 on RA. Pt given cardizem 10 iv and cardizem 30 PO with good response in heart rate. (2020 23:59)  Family at bedside denies Hx renal issue.      PAST MEDICAL & SURGICAL HISTORY:  Pacemaker. CABG  Type 2 diabetes mellitus with foot ulcer, unspecified whether long term insulin use  No pertinent past medical history  No significant past surgical history       FAMILY HISTORY:  No pertinent family history in first degree relatives      Social History: Non smoker    MEDICATIONS  (STANDING):  aspirin enteric coated 81 milliGRAM(s) Oral daily  carvedilol 25 milliGRAM(s) Oral every 12 hours  dextrose 5%. 1000 milliLiter(s) (50 mL/Hr) IV Continuous <Continuous>  dextrose 50% Injectable 12.5 Gram(s) IV Push once  dextrose 50% Injectable 25 Gram(s) IV Push once  dextrose 50% Injectable 25 Gram(s) IV Push once  diltiazem    Tablet 30 milliGRAM(s) Oral every 8 hours  insulin lispro (HumaLOG) corrective regimen sliding scale   SubCutaneous Before meals and at bedtime  levoFLOXacin  Tablet 250 milliGRAM(s) Oral every 24 hours  linezolid    Tablet 600 milliGRAM(s) Oral every 12 hours  pantoprazole  Injectable 40 milliGRAM(s) IV Push every 12 hours    MEDICATIONS  (PRN):  dextrose 40% Gel 15 Gram(s) Oral once PRN Blood Glucose LESS THAN 70 milliGRAM(s)/deciliter  diltiazem Injectable 10 milliGRAM(s) IV Push every 4 hours PRN prn HR >120/min  glucagon  Injectable 1 milliGRAM(s) IntraMuscular once PRN Glucose LESS THAN 70 milligrams/deciliter   Meds reviewed    Allergies    No Known Allergies    Intolerances    chocolate glucerna (Unknown)      REVIEW OF SYSTEMS:    CONSTITUTIONAL:   feels weak  EYES: No eye pain, visual disturbances, or discharge  ENMT:  No difficulty hearing, tinnitus, vertigo; No sinus or throat pain  NECK: No pain or stiffness  BREASTS: No pain, masses, or nipple discharge  RESPIRATORY: neg  CARDIOVASCULAR: No chest pain, palpitations, dizziness,   GASTROINTESTINAL: No abdominal or epigastric pain. No nausea, vomiting, or hematemesis; No diarrhea or constipation.   GENITOURINARY: No dysuria, frequency, hematuria, or incontinence  NEUROLOGICAL: No headaches, memory loss, loss of strength, numbness, or tremors  SKIN: Neg  LYMPH NODES: No enlarged glands  ENDOCRINE: No heat or cold intolerance; No hair loss  MUSCULOSKELETAL: Chronic muscle wasting, infected foot  PSYCHIATRIC: No depression, anxiety, mood swings, or difficulty sleeping  HEME/LYMPH: No easy bruising, or bleeding gums  ALLERY AND IMMUNOLOGIC: No hives or eczema      Vital Signs Last 24 Hrs  T(C): 34.4 (2020 16:47), Max: 36.4 (2020 10:08)  T(F): 93.9 (2020 16:47), Max: 97.6 (2020 10:08)  HR: 69 (2020 16:47) (52 - 90)  BP: 81/58 (2020 16:47) (81/58 - 97/69)  BP(mean): --  RR: 17 (2020 16:47) (16 - 17)  SpO2: 93% (2020 16:47) (92% - 93%)     Daily Weight in k (2020 06:15)    PHYSICAL EXAM:    GENERAL: appears chronically ill, oriented.  HEAD:  Atraumatic, Normocephalic  EYES: EOMI, PERRLA, conjunctiva and sclera clear  ENMT: No tonsillar erythema, exudates, or enlargement; Moist mucous membranes,   NECK: Supple, neck  veins full  NERVOUS SYSTEM:  Alert & Oriented X3, Good concentration; Motor Strength wnl upper and lower extremities;  CHEST/LUNG: Clear to percussion bilaterally; No rub  HEART: Regular rate and rhythm; No rubs; + sternal scar, pacer  ABDOMEN: Soft, Nontender, Nondistended; Bowel sounds present,  EXTREMITIES:  +1 leg edema , feet same  LYMPH: No lymphadenopathy noted  SKIN: No rashes or lesions, pale   neg    LABS:                        8.1    7.64  )-----------( 80       ( 2020 10:59 )             25.1     01-09    133<L>  |  97<L>  |  77.0<H>  ----------------------------<  164<H>  5.8<H>   |  16.0<L>  |  1.45<H>    Ca    8.8      2020 10:59  Mg     2.4     01-08                  RADIOLOGY & ADDITIONAL TESTS:

## 2020-01-09 NOTE — PROGRESS NOTE ADULT - ASSESSMENT
86-year-old woman with afib RVR c/o epigastric pain and melena. Hgb slightly lower this am but no active melena.  NPO after midnight  cont PPI.   possible EGD tomorrow if rate is better controlled    Thank you.

## 2020-01-09 NOTE — CONSULT NOTE ADULT - ASSESSMENT
New ARF with metabolic acidosis with + anion gap, thrombocytopenia  ; Hyponatremia and hyperkalemia. DM 2 may have RTA # 4.    Zyvox can cause anion gap Metabolic acidosis;   Pantoprazole can cause ARF and Hyponatremia.    Poor EF with mild Pulmonary Hypertension.    Anemia.

## 2020-01-09 NOTE — PROGRESS NOTE ADULT - ASSESSMENT
86 yr old female with diabetes mellitus, diabetic foot ulcer with recent diagnosis of osteomyelitis on IV antibiotics admitted with complaints of fatigue and worsening shortness of breath on exertion. She was noted to be in new onset atrial fibrillation with RVR. IV Cardizem was given, full dose anticoagulation was initiated and cardiology evaluation was requested. Cardiology advised increasing Coreg and continuing Lovenox. Lisinopril was added. Cardiology advised MIRTHA and cardioversion. Noted to have mild RASHAAD, lisinopril was held. Initially planned for DCCV, later advised rate control only. PT and palliative care consulted. Palliative care was consulted, patient made DNR/DNI. Given anemia and black stools, FOBT was ordered, which was positive. PT advised RAHUL. Kayexalate was ordered as she was noted to have hyperkalemia. GI evaluation was requested and advised EGD for further evaluation. EGD was rescheduled as she was noted to be tachycardic. Nephrology was consulted for hyperkalemia.

## 2020-01-09 NOTE — PROGRESS NOTE ADULT - SUBJECTIVE AND OBJECTIVE BOX
INTERVAL HPI/OVERNIGHT EVENTS: Brought down to endo. Patient was tachycardiac with -145. EGD cancelled after discussion with Anesthesia     ROS wnl     PAST MEDICAL & SURGICAL HISTORY:  Pacemaker  Type 2 diabetes mellitus with foot ulcer, unspecified whether long term insulin use  No pertinent past medical history  No significant past surgical history      Home Medications:  alendronate 70 mg oral tablet: 1 tab(s) orally once a week (27 Dec 2019 13:57)  Coreg 12.5 mg oral tablet: 1 tab(s) orally 2 times a day (27 Dec 2019 13:57)  ertapenem 1 g injection: 1 gram(s) injectable once a day (27 Dec 2019 14:01)  ferrous sulfate 325 mg (65 mg elemental iron) oral tablet: 1 tab(s) orally once a day (27 Dec 2019 13:57)      MEDICATIONS  (STANDING):  aspirin enteric coated 81 milliGRAM(s) Oral daily  carvedilol 25 milliGRAM(s) Oral every 12 hours  dextrose 5%. 1000 milliLiter(s) (50 mL/Hr) IV Continuous <Continuous>  dextrose 50% Injectable 12.5 Gram(s) IV Push once  dextrose 50% Injectable 25 Gram(s) IV Push once  dextrose 50% Injectable 25 Gram(s) IV Push once  diltiazem    Tablet 30 milliGRAM(s) Oral every 8 hours  insulin lispro (HumaLOG) corrective regimen sliding scale   SubCutaneous Before meals and at bedtime  levoFLOXacin  Tablet 250 milliGRAM(s) Oral every 24 hours  linezolid    Tablet 600 milliGRAM(s) Oral every 12 hours  pantoprazole  Injectable 40 milliGRAM(s) IV Push every 12 hours  sodium polystyrene sulfonate Suspension 30 Gram(s) Oral once    MEDICATIONS  (PRN):  dextrose 40% Gel 15 Gram(s) Oral once PRN Blood Glucose LESS THAN 70 milliGRAM(s)/deciliter  diltiazem Injectable 10 milliGRAM(s) IV Push every 4 hours PRN prn HR >120/min  glucagon  Injectable 1 milliGRAM(s) IntraMuscular once PRN Glucose LESS THAN 70 milligrams/deciliter      Allergies    No Known Allergies    Intolerances    chocolate glucerna (Unknown)        PHYSICAL EXAM:   Vital Signs:  Vital Signs Last 24 Hrs  T(C): 36.4 (2020 10:08), Max: 36.4 (2020 10:08)  T(F): 97.6 (2020 10:08), Max: 97.6 (2020 10:08)  HR: 52 (2020 10:08) (52 - 90)  BP: 97/69 (2020 10:08) (88/54 - 97/69)  BP(mean): --  RR: 16 (2020 10:08) (16 - 18)  SpO2: 92% (2020 10:08) (92% - 93%)  Daily     Daily Weight in k (2020 06:15)    GENERAL:  no distress  HEENT:  NC/AT,  anicteric  ABDOMEN:  Soft, non-tender, non-distended, normoactive bowel sounds,  no masses  EXTREMITIES  no cyanosis      LABS:                        8.1    7.64  )-----------( 80       ( 2020 10:59 )             25.1       Hemoglobin: 8.1 g/dL (20 @ 10:59)  Hemoglobin: 8.6 g/dL (20 @ 06:15)  Hemoglobin: 8.1 g/dL (20 @ 07:51)          133<L>  |  97<L>  |  77.0<H>  ----------------------------<  164<H>  5.8<H>   |  16.0<L>  |  1.45<H>    Ca    8.8      2020 10:59  Mg     2.4           RADIOLOGY & ADDITIONAL TESTS:  < from: Xray Chest 1 View- PORTABLE-Urgent (20 @ 20:22) >  IMPRESSION: Cardiomegaly No evidence of active chest disease.    < end of copied text >

## 2020-01-09 NOTE — PROGRESS NOTE ADULT - SUBJECTIVE AND OBJECTIVE BOX
INTERVAL HPI/OVERNIGHT EVENTS:    CC: hyperkalemia, atrial fibrillation with RVR, diabetes mellitus with osteomyelitis of left 2nd toe, systolic CHF, GI bleed      No overnight events, denies complaints.    Vital Signs Last 24 Hrs  T(C): 36.4 (09 Jan 2020 10:08), Max: 36.4 (09 Jan 2020 10:08)  T(F): 97.6 (09 Jan 2020 10:08), Max: 97.6 (09 Jan 2020 10:08)  HR: 52 (09 Jan 2020 10:08) (52 - 90)  BP: 97/69 (09 Jan 2020 10:08) (88/54 - 97/69)  BP(mean): --  RR: 16 (09 Jan 2020 10:08) (16 - 18)  SpO2: 92% (09 Jan 2020 10:08) (92% - 93%)    PHYSICAL EXAM:    GENERAL: Not in distress, alert  CHEST/LUNG: b/l air entry  HEART: irregular  ABDOMEN: Soft, BS+  EXTREMITIES: no edema, tenderness    MEDICATIONS  (STANDING):  aspirin enteric coated 81 milliGRAM(s) Oral daily  carvedilol 25 milliGRAM(s) Oral every 12 hours  dextrose 5%. 1000 milliLiter(s) (50 mL/Hr) IV Continuous <Continuous>  dextrose 50% Injectable 12.5 Gram(s) IV Push once  dextrose 50% Injectable 25 Gram(s) IV Push once  dextrose 50% Injectable 25 Gram(s) IV Push once  diltiazem    Tablet 30 milliGRAM(s) Oral every 8 hours  insulin lispro (HumaLOG) corrective regimen sliding scale   SubCutaneous Before meals and at bedtime  levoFLOXacin  Tablet 250 milliGRAM(s) Oral every 24 hours  linezolid    Tablet 600 milliGRAM(s) Oral every 12 hours  pantoprazole  Injectable 40 milliGRAM(s) IV Push every 12 hours  sodium polystyrene sulfonate Suspension 30 Gram(s) Oral once    MEDICATIONS  (PRN):  dextrose 40% Gel 15 Gram(s) Oral once PRN Blood Glucose LESS THAN 70 milliGRAM(s)/deciliter  diltiazem Injectable 10 milliGRAM(s) IV Push every 4 hours PRN prn HR >120/min  glucagon  Injectable 1 milliGRAM(s) IntraMuscular once PRN Glucose LESS THAN 70 milligrams/deciliter      Allergies    No Known Allergies    Intolerances    chocolate glucerna (Unknown)        LABS:                          8.1    7.64  )-----------( 80       ( 09 Jan 2020 10:59 )             25.1     01-09    133<L>  |  97<L>  |  77.0<H>  ----------------------------<  164<H>  5.8<H>   |  16.0<L>  |  1.45<H>    Ca    8.8      09 Jan 2020 10:59  Mg     2.4     01-08            RADIOLOGY & ADDITIONAL TESTS:

## 2020-01-09 NOTE — PROGRESS NOTE ADULT - SUBJECTIVE AND OBJECTIVE BOX
[place] [facility] - [facility], THE HEART CENTER                                   [address] [place] [address]                                                      PHONE: [phone]                                                         FAX: [phone]  [email or web address]  ---------------------------------------------------------------------------------------------------------------------------------    Overnight events/patient complaints: patient seen at bedside. She feels better.       chocolate glucerna (Unknown)  No Known Allergies    MEDICATIONS  (STANDING):  aspirin enteric coated 81 milliGRAM(s) Oral daily  carvedilol 25 milliGRAM(s) Oral every 12 hours  dextrose 5%. 1000 milliLiter(s) (50 mL/Hr) IV Continuous <Continuous>  dextrose 50% Injectable 12.5 Gram(s) IV Push once  dextrose 50% Injectable 25 Gram(s) IV Push once  dextrose 50% Injectable 25 Gram(s) IV Push once  insulin lispro (HumaLOG) corrective regimen sliding scale   SubCutaneous Before meals and at bedtime  levoFLOXacin  Tablet 250 milliGRAM(s) Oral every 24 hours  linezolid    Tablet 600 milliGRAM(s) Oral every 12 hours  pantoprazole  Injectable 40 milliGRAM(s) IV Push every 12 hours    MEDICATIONS  (PRN):  dextrose 40% Gel 15 Gram(s) Oral once PRN Blood Glucose LESS THAN 70 milliGRAM(s)/deciliter  diltiazem Injectable 10 milliGRAM(s) IV Push every 4 hours PRN prn HR >120/min  glucagon  Injectable 1 milliGRAM(s) IntraMuscular once PRN Glucose LESS THAN 70 milligrams/deciliter      Vital Signs Last 24 Hrs  T(C): 36.3 (09 Jan 2020 05:36), Max: 36.4 (08 Jan 2020 10:00)  T(F): 97.4 (09 Jan 2020 05:36), Max: 97.5 (08 Jan 2020 10:00)  HR: 88 (09 Jan 2020 05:36) (84 - 95)  BP: 88/54 (09 Jan 2020 05:36) (88/54 - 102/60)  BP(mean): --  RR: 16 (09 Jan 2020 05:36) (16 - 18)  SpO2: 92% (09 Jan 2020 05:36) (92% - 98%)  ICU Vital Signs Last 24 Hrs  BLANCA MONTERROSO  I&O's Detail    Drug Dosing Weight  BLANCA MONTERROSO      PHYSICAL EXAM:  General: Appears well developed, well nourished alert and cooperative.  HEENT: Head; normocephalic, atraumatic.  Eyes: Pupils reactive, cornea wnl.  Neck: Supple, no nodes adenopathy, no NVD or carotid bruit or thyromegaly.  CARDIOVASCULAR: Normal S1 and S2, No murmur, rub, gallop or lift.   LUNGS: No rales, rhonchi or wheeze. Normal breath sounds bilaterally.  ABDOMEN: Soft, nontender without mass or organomegaly. bowel sounds normoactive.  EXTREMITIES: No clubbing, cyanosis or edema. Distal pulses wnl.   SKIN: warm and dry with normal turgor.  NEURO: Alert/oriented x 3/normal motor exam. No pathologic reflexes.    PSYCH: normal affect.        LABS:                        8.6    8.70  )-----------( 88       ( 08 Jan 2020 06:15 )             26.8     01-09    133<L>  |  100  |  70.0<H>  ----------------------------<  156<H>  5.6<H>   |  19.0<L>  |  1.20    Ca    8.5<L>      09 Jan 2020 06:11  Mg     2.4     01-08      BLANCA MONTERROSO            RADIOLOGY & ADDITIONAL STUDIES:    INTERPRETATION OF TELEMETRY (personally reviewed): not on telemetry    ASSESSMENT AND PLAN:  In summary, BLANCA MONTERROSO is a 86y Female with past medical history significant for chronic systolic CHF and valvular disease, DM2, discharged from [facility] recently with PICC line in place after being treated for diabetic foot ulcer/osteo, was readmitted for worsening fatigue, STINSON and palpitations- suspect atrial flutter/atrial tachy. Patient additionally with elevated troponin likely 2/2 type II MI in the setting of her tachyarrhythmia     Atrial arrhythmia   [id number] >>2  - no systemic AC, given report of melenic stool and downtrending H/H    - continue coreg at present dose (hold for SBP < 85)    Chronic HFrEF  - continue coreg, no ACEi/ARB/ARNI given her hyperkalemia   - PRN diuretics  - continue to monitor renal indices     Plan for EGD today. There is no absolute cardiac contraindication and she can proceed without further testing. Will follow with you.

## 2020-01-10 VITALS — HEART RATE: 110 BPM

## 2020-01-10 DIAGNOSIS — K92.1 MELENA: ICD-10-CM

## 2020-01-10 LAB
ANION GAP SERPL CALC-SCNC: 23 MMOL/L — HIGH (ref 5–17)
BUN SERPL-MCNC: 92 MG/DL — HIGH (ref 8–20)
CALCIUM SERPL-MCNC: 8.3 MG/DL — LOW (ref 8.6–10.2)
CHLORIDE SERPL-SCNC: 99 MMOL/L — SIGNIFICANT CHANGE UP (ref 98–107)
CO2 SERPL-SCNC: 13 MMOL/L — LOW (ref 22–29)
CREAT SERPL-MCNC: 2.11 MG/DL — HIGH (ref 0.5–1.3)
FERRITIN SERPL-MCNC: 662 NG/ML — HIGH (ref 15–150)
GLUCOSE BLDC GLUCOMTR-MCNC: 117 MG/DL — HIGH (ref 70–99)
GLUCOSE SERPL-MCNC: 145 MG/DL — HIGH (ref 70–115)
HCT VFR BLD CALC: 22.5 % — LOW (ref 34.5–45)
HGB BLD-MCNC: 7.1 G/DL — LOW (ref 11.5–15.5)
IRON SATN MFR SERPL: 211 UG/DL — HIGH (ref 37–145)
IRON SATN MFR SERPL: 87 % — HIGH (ref 14–50)
MAGNESIUM SERPL-MCNC: 2.6 MG/DL — SIGNIFICANT CHANGE UP (ref 1.8–2.6)
MCHC RBC-ENTMCNC: 30.7 PG — SIGNIFICANT CHANGE UP (ref 27–34)
MCHC RBC-ENTMCNC: 31.6 GM/DL — LOW (ref 32–36)
MCV RBC AUTO: 97.4 FL — SIGNIFICANT CHANGE UP (ref 80–100)
PLATELET # BLD AUTO: 46 K/UL — LOW (ref 150–400)
POTASSIUM SERPL-MCNC: 4.9 MMOL/L — SIGNIFICANT CHANGE UP (ref 3.5–5.3)
POTASSIUM SERPL-SCNC: 4.9 MMOL/L — SIGNIFICANT CHANGE UP (ref 3.5–5.3)
RBC # BLD: 2.31 M/UL — LOW (ref 3.8–5.2)
RBC # FLD: 14.5 % — SIGNIFICANT CHANGE UP (ref 10.3–14.5)
SODIUM SERPL-SCNC: 135 MMOL/L — SIGNIFICANT CHANGE UP (ref 135–145)
TIBC SERPL-MCNC: 243 UG/DL — SIGNIFICANT CHANGE UP (ref 220–430)
TRANSFERRIN SERPL-MCNC: 170 MG/DL — LOW (ref 192–382)
WBC # BLD: 4.57 K/UL — SIGNIFICANT CHANGE UP (ref 3.8–10.5)
WBC # FLD AUTO: 4.57 K/UL — SIGNIFICANT CHANGE UP (ref 3.8–10.5)

## 2020-01-10 PROCEDURE — 82272 OCCULT BLD FECES 1-3 TESTS: CPT

## 2020-01-10 PROCEDURE — 84145 PROCALCITONIN (PCT): CPT

## 2020-01-10 PROCEDURE — 71045 X-RAY EXAM CHEST 1 VIEW: CPT

## 2020-01-10 PROCEDURE — 70450 CT HEAD/BRAIN W/O DYE: CPT

## 2020-01-10 PROCEDURE — 86140 C-REACTIVE PROTEIN: CPT

## 2020-01-10 PROCEDURE — P9016: CPT

## 2020-01-10 PROCEDURE — 80053 COMPREHEN METABOLIC PANEL: CPT

## 2020-01-10 PROCEDURE — 83550 IRON BINDING TEST: CPT

## 2020-01-10 PROCEDURE — P9047: CPT

## 2020-01-10 PROCEDURE — 86901 BLOOD TYPING SEROLOGIC RH(D): CPT

## 2020-01-10 PROCEDURE — 84466 ASSAY OF TRANSFERRIN: CPT

## 2020-01-10 PROCEDURE — 83540 ASSAY OF IRON: CPT

## 2020-01-10 PROCEDURE — 99232 SBSQ HOSP IP/OBS MODERATE 35: CPT

## 2020-01-10 PROCEDURE — 84484 ASSAY OF TROPONIN QUANT: CPT

## 2020-01-10 PROCEDURE — 36430 TRANSFUSION BLD/BLD COMPNT: CPT

## 2020-01-10 PROCEDURE — T1013: CPT

## 2020-01-10 PROCEDURE — 85652 RBC SED RATE AUTOMATED: CPT

## 2020-01-10 PROCEDURE — 82962 GLUCOSE BLOOD TEST: CPT

## 2020-01-10 PROCEDURE — 85730 THROMBOPLASTIN TIME PARTIAL: CPT

## 2020-01-10 PROCEDURE — 80048 BASIC METABOLIC PNL TOTAL CA: CPT

## 2020-01-10 PROCEDURE — 86900 BLOOD TYPING SEROLOGIC ABO: CPT

## 2020-01-10 PROCEDURE — 87040 BLOOD CULTURE FOR BACTERIA: CPT

## 2020-01-10 PROCEDURE — 85027 COMPLETE CBC AUTOMATED: CPT

## 2020-01-10 PROCEDURE — 99233 SBSQ HOSP IP/OBS HIGH 50: CPT

## 2020-01-10 PROCEDURE — 86923 COMPATIBILITY TEST ELECTRIC: CPT

## 2020-01-10 PROCEDURE — 82728 ASSAY OF FERRITIN: CPT

## 2020-01-10 PROCEDURE — 93005 ELECTROCARDIOGRAM TRACING: CPT

## 2020-01-10 PROCEDURE — 97163 PT EVAL HIGH COMPLEX 45 MIN: CPT

## 2020-01-10 PROCEDURE — 83735 ASSAY OF MAGNESIUM: CPT

## 2020-01-10 PROCEDURE — 84100 ASSAY OF PHOSPHORUS: CPT

## 2020-01-10 PROCEDURE — 86850 RBC ANTIBODY SCREEN: CPT

## 2020-01-10 PROCEDURE — 99291 CRITICAL CARE FIRST HOUR: CPT | Mod: 25

## 2020-01-10 PROCEDURE — 36415 COLL VENOUS BLD VENIPUNCTURE: CPT

## 2020-01-10 PROCEDURE — 85610 PROTHROMBIN TIME: CPT

## 2020-01-10 PROCEDURE — 96374 THER/PROPH/DIAG INJ IV PUSH: CPT

## 2020-01-10 RX ORDER — FAMOTIDINE 10 MG/ML
20 INJECTION INTRAVENOUS
Refills: 0 | Status: DISCONTINUED | OUTPATIENT
Start: 2020-01-10 | End: 2020-01-10

## 2020-01-10 RX ADMIN — LINEZOLID 600 MILLIGRAM(S): 600 INJECTION, SOLUTION INTRAVENOUS at 06:52

## 2020-01-10 RX ADMIN — Medication 1300 MILLIGRAM(S): at 06:53

## 2020-01-10 RX ADMIN — Medication 50 MILLILITER(S): at 06:51

## 2020-01-10 RX ADMIN — PANTOPRAZOLE SODIUM 40 MILLIGRAM(S): 20 TABLET, DELAYED RELEASE ORAL at 06:52

## 2020-01-10 NOTE — PROGRESS NOTE ADULT - PROBLEM SELECTOR PROBLEM 1
Kanwal
Atrial fibrillation with RVR

## 2020-01-10 NOTE — PROGRESS NOTE ADULT - PROBLEM SELECTOR PROBLEM 2
Type 2 diabetes mellitus with foot ulcer, unspecified whether long term insulin use

## 2020-01-10 NOTE — PROGRESS NOTE ADULT - SUBJECTIVE AND OBJECTIVE BOX
[place] [facility] - [facility], THE HEART CENTER                                   [address] [place] [address]                                                      PHONE: [phone]                                                         FAX: [phone]  [email or web address]  ---------------------------------------------------------------------------------------------------------------------------------    Overnight events/patient complaints: patient seen at bedside.       chocolate glucerna (Unknown)  No Known Allergies    MEDICATIONS  (STANDING):  albumin human 25% IVPB 50 milliLiter(s) IV Intermittent every 12 hours  aspirin enteric coated 81 milliGRAM(s) Oral daily  carvedilol 25 milliGRAM(s) Oral every 12 hours  dextrose 5%. 1000 milliLiter(s) (50 mL/Hr) IV Continuous <Continuous>  dextrose 50% Injectable 12.5 Gram(s) IV Push once  dextrose 50% Injectable 25 Gram(s) IV Push once  dextrose 50% Injectable 25 Gram(s) IV Push once  diltiazem    Tablet 30 milliGRAM(s) Oral every 8 hours  famotidine  IVPB 20 milliGRAM(s) IV Intermittent two times a day  furosemide   Injectable 40 milliGRAM(s) IV Push every 12 hours  insulin lispro (HumaLOG) corrective regimen sliding scale   SubCutaneous Before meals and at bedtime  levoFLOXacin  Tablet 250 milliGRAM(s) Oral every 24 hours  sodium bicarbonate 1300 milliGRAM(s) Oral three times a day    MEDICATIONS  (PRN):  dextrose 40% Gel 15 Gram(s) Oral once PRN Blood Glucose LESS THAN 70 milliGRAM(s)/deciliter  diltiazem Injectable 10 milliGRAM(s) IV Push every 4 hours PRN prn HR >120/min  glucagon  Injectable 1 milliGRAM(s) IntraMuscular once PRN Glucose LESS THAN 70 milligrams/deciliter      Vital Signs Last 24 Hrs  T(C): 35.7 (10 Evelio 2020 06:50), Max: 36.4 (09 Jan 2020 10:08)  T(F): 96.3 (10 Evelio 2020 06:50), Max: 97.6 (09 Jan 2020 10:08)  HR: 70 (10 Evelio 2020 06:50) (52 - 70)  BP: 84/64 (10 Evelio 2020 06:50) (81/58 - 97/69)  BP(mean): --  RR: 17 (10 Evelio 2020 06:50) (16 - 17)  SpO2: 94% (10 Evelio 2020 06:50) (92% - 94%)  ICU Vital Signs Last 24 Hrs  BLANCA MONTERROSO  I&O's Detail    Drug Dosing Weight  BLANCA MONTERROSO      PHYSICAL EXAM:  General: Appears well developed, well nourished alert and cooperative.  HEENT: Head; normocephalic, atraumatic.  Eyes: Pupils reactive, cornea wnl.  Neck: Supple, no nodes adenopathy, no NVD or carotid bruit or thyromegaly.  CARDIOVASCULAR: Normal S1 and S2, No murmur, rub, gallop or lift.   LUNGS: No rales, rhonchi or wheeze. Normal breath sounds bilaterally.  ABDOMEN: Soft, nontender without mass or organomegaly. bowel sounds normoactive.  EXTREMITIES: No clubbing, cyanosis or edema. Distal pulses wnl.   SKIN: warm and dry with normal turgor.  NEURO: Alert/oriented x 3/normal motor exam. No pathologic reflexes.    PSYCH: normal affect.        LABS:                        7.1    4.57  )-----------( 46       ( 10 Evelio 2020 06:05 )             22.5     01-10    135  |  99  |  92.0<H>  ----------------------------<  145<H>  4.9   |  13.0<L>  |  2.11<H>    Ca    8.3<L>      10 Evelio 2020 06:05  Mg     2.6     01-10        ASSESSMENT AND PLAN:  In summary, BLANCA MONTERROSO is a 86y Female with past medical history significant for chronic systolic CHF and valvular disease, DM2, discharged from [facility] recently with PICC line in place after being treated for diabetic foot ulcer/osteo, was readmitted for worsening fatigue, STINSON and palpitations- suspect atrial flutter/atrial tachy. Patient additionally with elevated troponin likely 2/2 type II MI in the setting of her tachyarrhythmia     EGD cancelled yesterday for tachycardia but patient has not been on telemetry. HR controlled overnight according to Flowsheet.    Atrial arrhythmia -- [id number] >>2  - no systemic AC, given report of melenic stool and downtrending H/H and worsening thrombocytopenia. she is likely not a candidate for long term AC.  - continue coreg at present dose (hold for SBP < 85)  - please put patient back on telemetry for HR monitoring    Chronic HFrEF  - continue coreg, no ACEi/ARB/ARNI given her hyperkalemia   - PRN diuretics  - continue to monitor renal indices     No cardiac contraindication to EGD today if HR remains stable.

## 2020-01-10 NOTE — PROGRESS NOTE ADULT - REASON FOR ADMISSION
new afib

## 2020-01-10 NOTE — PROGRESS NOTE ADULT - PROBLEM SELECTOR PLAN 1
EGD on hold for now- needs two units PRBC and Platelets. If subsequent BP acceptable will try for EGD later this afternoon.

## 2020-01-10 NOTE — PROGRESS NOTE ADULT - PROBLEM SELECTOR PROBLEM 3
Chronic systolic congestive heart failure

## 2020-01-10 NOTE — PROGRESS NOTE ADULT - SUBJECTIVE AND OBJECTIVE BOX
[facility]  INFECTIOUS DISEASES AND INTERNAL MEDICINE at [place]  =======================================================  Leandro Monk MD  Diplomates American Board of Internal Medicine and Infectious Diseases  =======================================================    [id number]  BLANCA MONTERROSO       Follow up: weakness and loss of appetite, new onset afib, ID following for OM    She was started on levaquin and linezolid orally to complete the course of treatment for total 6 weeks.  Since had acidosis and thrombocytopenia called back ID to reevaluation of antibiotics.  She seems weaker but states that she feels fine, no complaint this morning.     PAST MEDICAL & SURGICAL HISTORY:  Pacemaker  Type 2 diabetes mellitus with foot ulcer, unspecified whether long term insulin use  No pertinent past medical history  No significant past surgical history    Social Hx: no smoking, ETOH or drugs.     FAMILY HISTORY:  No pertinent family history in first degree relatives    Allergies  No Known Allergies    Antibiotics:  ertapenem  IVPB 1000 milliGRAM(s) IV Intermittent every 24 hours  linezolid    Tablet 600 milliGRAM(s) Oral every 12 hours     REVIEW OF SYSTEMS:  CONSTITUTIONAL:  No Fever or chills, + weakness  HEENT:  No diplopia or blurred vision.  No sore throat or runny nose.  CARDIOVASCULAR:  No chest pain or SOB.  RESPIRATORY:  No cough, shortness of breath, PND or orthopnea.  GASTROINTESTINAL:  No nausea, vomiting or diarrhea.  GENITOURINARY:  No dysuria, frequency or urgency. No Blood in urine  MUSCULOSKELETAL:  no joint aches, no muscle pain  SKIN:  No change in skin, hair or nails.  NEUROLOGIC:  No paresthesias, fasciculations, seizures or weakness.  PSYCHIATRIC:  No disorder of thought or mood.  ENDOCRINE:  No heat or cold intolerance, polyuria or polydipsia.  HEMATOLOGICAL:  No easy bruising or bleeding.     Physical Exam:  Vital Signs Last 24 Hrs  T(C): 36.5 (10 Evelio 2020 11:20), Max: 36.5 (10 Evelio 2020 11:20)  T(F): 97.7 (10 Evelio 2020 11:20), Max: 97.7 (10 Evelio 2020 11:20)  HR: 132 (10 Evelio 2020 11:20) (69 - 132)  BP: 78/50 (10 Evelio 2020 11:20) (68/40 - 84/64)  BP(mean): --  RR: 18 (10 Evelio 2020 11:20) (17 - 20)  SpO2: 95% (10 Evelio 2020 10:52) (93% - 95%)  GEN: NAD  HEENT: normocephalic and atraumatic. EOMI. PERRL.    NECK: Supple.  No lymphadenopathy   LUNGS: Clear to auscultation.  HEART: Regular rate and rhythm without murmur.  ABDOMEN: Soft, nontender, and nondistended.  Positive bowel sounds.    : No CVA tenderness  EXTREMITIES: Without any cyanosis, clubbing, rash, lesions or edema.  NEUROLOGIC: grossly intact.  PSYCHIATRIC: Appropriate affect .  SKIN: dry gangrene on left 2nd toe.     Labs:  01-10    135  |  99  |  92.0<H>  ----------------------------<  145<H>  4.9   |  13.0<L>  |  2.11<H>    Ca    8.3<L>      10 Evelio 2020 06:05  Mg     2.6     01-10                      7.1    4.57  )-----------( 46       ( 10 Evelio 2020 06:05 )             22.5       RECENT CULTURES:  01-04 @ 01:47 .Blood     No growth at 48 hours    01-04 @ 01:46 .Blood     No growth at 48 hours      RECENT CULTURES:  01-04 @ 01:47 .Blood     No growth at 48 hours    01-04 @ 01:46 .Blood     No growth at 48 hours    All imaging and other data have been reviewed.    Assessment and Plan:   85y/o woman with PMH of HTN, DM, HFrEF that was discharged from [facility] recently after being treated for diabetic foot ulcer/osteo, was readmitted for worsening fatigue, STINSON and palpitations. She has a PICC line and went home on IV ertapenem and linezolid. Since she was not feeling well and a fib was new onset, she was admitted for more work up.   she had toe nailed clipped about 1 month ago. There was incidental clip injury to tip of left 2nd toe.  Since the toe has become discolored, tender, swollen and she hs had difficulty ambulating or bearing weight on left foot.  Linezolid is known to cause bone marrow suppression and thrombocytopenia is related to that.   But unclear if RASHAAD and other symptoms are 2' to linezolid.     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   - Stop linezolid and start Doxycycline 100mg q12h to complete the course until 1/29.     Will follow.

## 2020-01-10 NOTE — PROGRESS NOTE ADULT - SUBJECTIVE AND OBJECTIVE BOX
INTERVAL HPI/OVERNIGHT EVENTS:    CC: steve, systolic chf, gi bleed, thrombocytopenia, anemia, foot ulcer, steve, hyponatremia.    Patient seen multiple times today, noted to have low Hb, hypotension, evaluated by GI and cardiology, plan for PRBC and eventual EGD if BP stable. Discussed with son Tucker over phone, regarding guarded prognosis, he expressed willingness with wanting to proceed with endoscopic evaluation and PRBC transfusion.     Vital Signs Last 24 Hrs  T(C): 36.5 (10 Evelio 2020 11:20), Max: 36.5 (10 Evelio 2020 11:20)  T(F): 97.7 (10 Evelio 2020 11:20), Max: 97.7 (10 Evelio 2020 11:20)  HR: 110 (10 Evelio 2020 12:40) (69 - 132)  BP: 78/50 (10 Evelio 2020 11:20) (68/40 - 84/64)  BP(mean): --  RR: 18 (10 Evelio 2020 11:20) (17 - 20)  SpO2: 95% (10 Evelio 2020 10:52) (93% - 95%)    PHYSICAL EXAM:    GENERAL: pale, lethargic, not in distress  CHEST/LUNG: b/l air entry  HEART: irregular  ABDOMEN: Soft, BS+, non tender  EXTREMITIES: no edema, tenderness    MEDICATIONS  (STANDING):  albumin human 25% IVPB 50 milliLiter(s) IV Intermittent every 12 hours  aspirin enteric coated 81 milliGRAM(s) Oral daily  carvedilol 25 milliGRAM(s) Oral every 12 hours  dextrose 5%. 1000 milliLiter(s) (50 mL/Hr) IV Continuous <Continuous>  dextrose 50% Injectable 12.5 Gram(s) IV Push once  dextrose 50% Injectable 25 Gram(s) IV Push once  dextrose 50% Injectable 25 Gram(s) IV Push once  diltiazem    Tablet 30 milliGRAM(s) Oral every 8 hours  doxycycline hyclate Capsule 100 milliGRAM(s) Oral every 12 hours  famotidine  IVPB 20 milliGRAM(s) IV Intermittent two times a day  furosemide   Injectable 40 milliGRAM(s) IV Push every 12 hours  insulin lispro (HumaLOG) corrective regimen sliding scale   SubCutaneous Before meals and at bedtime  levoFLOXacin  Tablet 250 milliGRAM(s) Oral every 24 hours  sodium bicarbonate 1300 milliGRAM(s) Oral three times a day    MEDICATIONS  (PRN):  dextrose 40% Gel 15 Gram(s) Oral once PRN Blood Glucose LESS THAN 70 milliGRAM(s)/deciliter  diltiazem Injectable 10 milliGRAM(s) IV Push every 4 hours PRN prn HR >120/min  glucagon  Injectable 1 milliGRAM(s) IntraMuscular once PRN Glucose LESS THAN 70 milligrams/deciliter      Allergies    No Known Allergies    Intolerances    chocolate glucerna (Unknown)        LABS:                          7.1    4.57  )-----------( 46       ( 10 Evelio 2020 06:05 )             22.5     01-10    135  |  99  |  92.0<H>  ----------------------------<  145<H>  4.9   |  13.0<L>  |  2.11<H>    Ca    8.3<L>      10 Evelio 2020 06:05  Mg     2.6     01-10            RADIOLOGY & ADDITIONAL TESTS:

## 2020-01-10 NOTE — CHART NOTE - NSCHARTNOTEFT_GEN_A_CORE
Source: Patient [x ]  Family [ ]   other [x ]    Current Diet: Diet, NPO after Midnight:      NPO Start Date: 08-Jan-2020,   NPO Start Time: 23:59 (01-08-20 @ 17:17)  Diet, Consistent Carbohydrate w/Evening Snack:   1200mL Fluid Restriction ([id number])  No Concentrated Potassium  Supplement Feeding Modality:  Oral  Glucerna Shake Cans or Servings Per Day:  3       Frequency:  Three Times a day (01-09-20 @ 18:12)      Patient reports [ ] nausea  [ ] vomiting [ ] diarrhea [ ] constipation  [ ]chewing problems [ ] swallowing issues  [ ] other:     PO intake:  < 50% [x ]   50-75%  [ ]   %  [ ]  other :    Source for PO intake [x ] Patient [ ] family [x ] chart [ ] staff [ ] other    Current Weight:   (1/9) 116.8 lbs  (1/8) 118.3 lbs  Noted with 2+ generalized and b/l leg edema, continue to trend    Pertinent Medications: MEDICATIONS  (STANDING):  albumin human 25% IVPB 50 milliLiter(s) IV Intermittent every 12 hours  aspirin enteric coated 81 milliGRAM(s) Oral daily  carvedilol 25 milliGRAM(s) Oral every 12 hours  dextrose 5%. 1000 milliLiter(s) (50 mL/Hr) IV Continuous <Continuous>  dextrose 50% Injectable 12.5 Gram(s) IV Push once  dextrose 50% Injectable 25 Gram(s) IV Push once  dextrose 50% Injectable 25 Gram(s) IV Push once  diltiazem    Tablet 30 milliGRAM(s) Oral every 8 hours  famotidine  IVPB 20 milliGRAM(s) IV Intermittent two times a day  furosemide   Injectable 40 milliGRAM(s) IV Push every 12 hours  insulin lispro (HumaLOG) corrective regimen sliding scale   SubCutaneous Before meals and at bedtime  levoFLOXacin  Tablet 250 milliGRAM(s) Oral every 24 hours  sodium bicarbonate 1300 milliGRAM(s) Oral three times a day    MEDICATIONS  (PRN):  dextrose 40% Gel 15 Gram(s) Oral once PRN Blood Glucose LESS THAN 70 milliGRAM(s)/deciliter  diltiazem Injectable 10 milliGRAM(s) IV Push every 4 hours PRN prn HR >120/min  glucagon  Injectable 1 milliGRAM(s) IntraMuscular once PRN Glucose LESS THAN 70 milligrams/deciliter    Pertinent Labs: CBC Full  -  ( 10 Evelio 2020 06:05 )  WBC Count : 4.57 K/uL  RBC Count : 2.31 M/uL  Hemoglobin : 7.1 g/dL  Hematocrit : 22.5 %  Platelet Count - Automated : 46 K/uL  Mean Cell Volume : 97.4 fl  Mean Cell Hemoglobin : 30.7 pg  Mean Cell Hemoglobin Concentration : 31.6 gm/dL  Auto Neutrophil # : x  Auto Lymphocyte # : x  Auto Monocyte # : x  Auto Eosinophil # : x  Auto Basophil # : x  Auto Neutrophil % : x  Auto Lymphocyte % : x  Auto Monocyte % : x  Auto Eosinophil % : x  Auto Basophil % : x    01-10 Na135 mmol/L Glu 145 mg/dL<H> K+ 4.9 mmol/L Cr  2.11 mg/dL<H> BUN 92.0 mg/dL<H> Phos n/a   Alb n/a   PAB n/a       Skin: Intact per documentation    Nutrition focused physical exam previously conducted - found signs of malnutrition [ ]absent [x ]present    Subcutaneous fat loss: [ x] Orbital fat pads region, [ x]Buccal fat region, [ ]Triceps region,  [ ]Ribs region    Muscle wasting: [ x]Temples region, [ x]Clavicle region, [ x]Shoulder region, [ ]Scapula region, [ ]Interosseous region,  [ ]thigh region, [ ]Calf region    severe muscle wasting at temple, clavicle, shoulder.  Moderate fat loss in orbital region, buccal pads.    Estimated Needs:   [x ] no change since previous assessment  [ ] recalculated:     Current Nutrition Diagnosis: Pt remains at high nutrition risk secondary to malnutrition (severe, chronic) related to inability to consume sufficient protein energy intake with decreased appetite in setting of osteomyelitis, recent fatigue and worsening dyspnea, now with epigastric pain and melena as evidenced by meeting <75% nutrient needs >1 month, severe muscle loss of temples, clavicles, shoulders, moderate fat loss of orbitals and buccal pads, +edema. Aware pt with confusion, reports decreased appetite. Pt is currently NPO for EGD, however, EGD on hold for now due to pt needing 2 units of PRBC/platelets. Aware plan for possible EDG later today. Prior to NPO status, pt has remained with poor po intake. Last BM 1/9 per documentation.    Recommendations:   1) Resume po diet as medically feasible/tolerated; continue Glucerna TID to optimize po intake and provide an additional 220 kcal, 10g protein per serving.  2) Rx: Nephro-Ramses MVI.  3) Obtain daily weights to monitor trends.    Monitoring and Evaluation:   [ ] PO intake [ ] Tolerance to diet prescription [X] Weights  [X] Follow up per protocol [X] Labs

## 2020-01-10 NOTE — PROGRESS NOTE ADULT - PROBLEM SELECTOR PLAN 3
Chronic and stable.
Chronic and stable. Hold ACEI.
Chronic and stable. Hold ACEI.
Chronic and stable. Add ACEI.

## 2020-01-10 NOTE — PROGRESS NOTE ADULT - PROBLEM SELECTOR PLAN 4
Continue Invanz and Linezolid.
Discontinue Linezolid
ID eval noted, midline in place, continue Invanz and Linezolid.

## 2020-01-10 NOTE — DISCHARGE NOTE FOR THE EXPIRED PATIENT - HOSPITAL COURSE
86 yr old female with diabetes mellitus, diabetic foot ulcer with recent diagnosis of osteomyelitis on IV antibiotics admitted with complaints of fatigue and worsening shortness of breath on exertion. She was noted to be in new onset atrial fibrillation with RVR. IV Cardizem was given, full dose anticoagulation was initiated and cardiology evaluation was requested. Cardiology advised increasing Coreg and continuing Lovenox. Lisinopril was added. Cardiology advised MIRTHA and cardioversion. Noted to have mild RASHAAD, lisinopril was held. Initially planned for DCCV, later advised rate control only. PT and palliative care consulted. Palliative care was consulted, patient made DNR/DNI. Given anemia and black stools, FOBT was ordered, which was positive. PT advised RAHUL. Kayexalate was ordered as she was noted to have hyperkalemia. GI evaluation was requested and advised EGD for further evaluation. EGD was rescheduled as she was noted to be tachycardic. Nephrology was consulted for hyperkalemia. Linezolid held for low platelets. PRBC was ordered. Noted to be in RASHAAD. Patient DNR/DNI, patient  on 1/10/20 at 12:54pm. Son notified.

## 2020-01-10 NOTE — PROGRESS NOTE ADULT - PROBLEM SELECTOR PLAN 2
Continue insulin, monitor FS, sliding scale coverage.

## 2020-01-10 NOTE — PROGRESS NOTE ADULT - SUBJECTIVE AND OBJECTIVE BOX
Pt seen and examined f/u melena    This morning her HR is controlled but the Hgb is down to 7.1 and the BP is 68/40 with some increased confusion. Denies abdominal pain, nausea or vomiting. Nurses report no BM since yesterday    REVIEW OF SYSTEMS:    CONSTITUTIONAL: No fever, weight loss, or fatigue  EYES: No eye pain, visual disturbances, or discharge  ENMT:  No difficulty hearing, tinnitus, vertigo; No sinus or throat pain  RESPIRATORY: No cough, wheezing, chills or hemoptysis; No shortness of breath  CARDIOVASCULAR: No chest pain, palpitations, dizziness, or leg swelling  GASTROINTESTINAL: as above    MEDICATIONS:  MEDICATIONS  (STANDING):  albumin human 25% IVPB 50 milliLiter(s) IV Intermittent every 12 hours  aspirin enteric coated 81 milliGRAM(s) Oral daily  carvedilol 25 milliGRAM(s) Oral every 12 hours  dextrose 5%. 1000 milliLiter(s) (50 mL/Hr) IV Continuous <Continuous>  dextrose 50% Injectable 12.5 Gram(s) IV Push once  dextrose 50% Injectable 25 Gram(s) IV Push once  dextrose 50% Injectable 25 Gram(s) IV Push once  diltiazem    Tablet 30 milliGRAM(s) Oral every 8 hours  famotidine  IVPB 20 milliGRAM(s) IV Intermittent two times a day  furosemide   Injectable 40 milliGRAM(s) IV Push every 12 hours  insulin lispro (HumaLOG) corrective regimen sliding scale   SubCutaneous Before meals and at bedtime  levoFLOXacin  Tablet 250 milliGRAM(s) Oral every 24 hours  sodium bicarbonate 1300 milliGRAM(s) Oral three times a day    MEDICATIONS  (PRN):  dextrose 40% Gel 15 Gram(s) Oral once PRN Blood Glucose LESS THAN 70 milliGRAM(s)/deciliter  diltiazem Injectable 10 milliGRAM(s) IV Push every 4 hours PRN prn HR >120/min  glucagon  Injectable 1 milliGRAM(s) IntraMuscular once PRN Glucose LESS THAN 70 milligrams/deciliter      Allergies    No Known Allergies    Intolerances    chocolate glucerna (Unknown)      Vital Signs Last 24 Hrs  T(C): 35.7 (10 Evelio 2020 06:50), Max: 35.7 (10 Evelio 2020 06:50)  T(F): 96.3 (10 Evelio 2020 06:50), Max: 96.3 (10 Evelio 2020 06:50)  HR: 132 (10 Evelio 2020 10:08) (69 - 132)  BP: 68/40 (10 Evelio 2020 10:08) (68/40 - 84/64)  BP(mean): --  RR: 20 (10 Evelio 2020 10:08) (17 - 20)  SpO2: 94% (10 Evelio 2020 06:50) (93% - 94%)      PHYSICAL EXAM:    General: ill appearing female, awake and alert but appears sleepy and slightly confused, in no acute distress  HEENT: MMM, conjunctiva and sclera clear  Gastrointestinal:Abdomen: Soft non-tender non-distended; Normal bowel sounds; No hepatosplenomegaly  Extremities: no cyanosis, clubbing or edema.  Skin: Warm and dry. No obvious rash    LABS:      CBC Full  -  ( 10 Evelio 2020 06:05 )  WBC Count : 4.57 K/uL  RBC Count : 2.31 M/uL  Hemoglobin : 7.1 g/dL  Hematocrit : 22.5 %  Platelet Count - Automated : 46 K/uL  Mean Cell Volume : 97.4 fl  Mean Cell Hemoglobin : 30.7 pg  Mean Cell Hemoglobin Concentration : 31.6 gm/dL  Auto Neutrophil # : x  Auto Lymphocyte # : x  Auto Monocyte # : x  Auto Eosinophil # : x  Auto Basophil # : x  Auto Neutrophil % : x  Auto Lymphocyte % : x  Auto Monocyte % : x  Auto Eosinophil % : x  Auto Basophil % : x    01-10    135  |  99  |  92.0<H>  ----------------------------<  145<H>  4.9   |  13.0<L>  |  2.11<H>    Ca    8.3<L>      10 Evelio 2020 06:05  Mg     2.6     01-10

## 2020-08-13 NOTE — PATIENT PROFILE ADULT - NSPROPTRIGHTNOTIFY_GEN_A_NUR
pleasant, well nourished, well developed, in no acute distress , normal communication ability , pleasant, well nourished, well developed, in no acute distress , normal communication ability yes

## 2021-05-25 NOTE — PROGRESS NOTE ADULT - ASSESSMENT
Diabetes (Pt is fasting)    Subjective   Dana Rincon is a 60 y.o. female is here today for follow-up.    History of Present Illness   Doing well, denies any dizzy spells or fatigue. occ when she stands up.  Here for a f/u up on her htn, hlp dm2- on rybelsus. Dos not want the shots      Current Outpatient Medications:   •  albuterol sulfate  (90 Base) MCG/ACT inhaler, Inhale 2 puffs Every 4 (Four) Hours As Needed for Wheezing., Disp: 18 g, Rfl: 5  •  anastrozole (ARIMIDEX) 1 MG tablet, TAKE 1 TABLET BY MOUTH DAILY, Disp: 90 tablet, Rfl: 3  •  fexofenadine (ALLEGRA) 30 MG tablet, Take 30 mg by mouth daily., Disp: , Rfl:   •  furosemide (LASIX) 20 MG tablet, TAKE 1 TABLET BY MOUTH DAILY AS NEEDED FOR SWELLING, Disp: 90 tablet, Rfl: 1  •  Melatonin 10 MG capsule, Take  by mouth., Disp: , Rfl:   •  metFORMIN ER (GLUCOPHAGE-XR) 750 MG 24 hr tablet, Take 1 tablet by mouth Daily., Disp: 90 tablet, Rfl: 1  •  mometasone-formoterol (Dulera) 100-5 MCG/ACT inhaler, Inhale 2 puffs Daily., Disp: 13 g, Rfl: 5  •  montelukast (SINGULAIR) 10 MG tablet, Take 1 tablet by mouth Daily., Disp: 90 tablet, Rfl: 1  •  olmesartan-hydrochlorothiazide (BENICAR HCT) 40-12.5 MG per tablet, Take 1 tablet by mouth Daily., Disp: 90 tablet, Rfl: 1  •  potassium chloride (K-DUR,KLOR-CON) 10 MEQ CR tablet, TAKE 1 TABLET BY MOUTH EVERY DAY WITH LASIX AS NEEDED, Disp: 90 tablet, Rfl: 1  •  pravastatin (PRAVACHOL) 10 MG tablet, Take 1 tablet by mouth Daily., Disp: 90 tablet, Rfl: 1  •  Rybelsus 14 MG tablet, TAKE 1 TABLET BY MOUTH DAILY. REPLACES JANUVIA., Disp: 30 tablet, Rfl: 5  •  glimepiride (AMARYL) 2 MG tablet, Take 1 tablet by mouth Daily With Breakfast., Disp: 30 tablet, Rfl: 5    Current Facility-Administered Medications:   •  cyanocobalamin injection 1,000 mcg, 1,000 mcg, Intramuscular, Q28 Days, Noreen Smith MD, 1,000 mcg at 05/25/21 1422      The following portions of the patient's history were reviewed and updated as  "appropriate: allergies, current medications, past family history, past medical history, past social history, past surgical history and problem list.    Review of Systems   Constitutional: Negative.  Negative for chills and fever.   HENT: Negative for ear discharge, ear pain, sinus pressure and sore throat.    Respiratory: Negative for cough, chest tightness and shortness of breath.    Cardiovascular: Negative for chest pain, palpitations and leg swelling.   Gastrointestinal: Negative for diarrhea, nausea and vomiting.   Musculoskeletal: Negative for arthralgias, back pain and myalgias.   Neurological: Positive for dizziness. Negative for syncope and headaches.   Psychiatric/Behavioral: Negative for confusion and sleep disturbance.       Objective   /84   Pulse (!) 36   Temp 97.1 °F (36.2 °C)   Ht 149.9 cm (59.02\")   Wt 110 kg (243 lb)   SpO2 97% Comment: ra  BMI 49.05 kg/m²   Physical Exam  Vitals and nursing note reviewed.   Constitutional:       Appearance: She is well-developed.   HENT:      Head: Normocephalic and atraumatic.      Right Ear: External ear normal.      Left Ear: External ear normal.      Mouth/Throat:      Pharynx: No oropharyngeal exudate.   Eyes:      Conjunctiva/sclera: Conjunctivae normal.      Pupils: Pupils are equal, round, and reactive to light.   Neck:      Thyroid: No thyromegaly.   Cardiovascular:      Rate and Rhythm: Regular rhythm. Bradycardia present.      Pulses: Normal pulses.      Heart sounds: Normal heart sounds. No murmur heard.   No friction rub. No gallop.    Pulmonary:      Effort: Pulmonary effort is normal.      Breath sounds: Normal breath sounds.   Abdominal:      General: Bowel sounds are normal. There is no distension.      Palpations: Abdomen is soft.      Tenderness: There is no abdominal tenderness.   Musculoskeletal:      Cervical back: Neck supple.   Skin:     General: Skin is warm and dry.   Neurological:      Mental Status: She is alert and oriented " 86 yr old female with diabetes mellitus, diabetic foot ulcer with recent diagnosis of osteomyelitis on IV antibiotics admitted with complaints of fatigue and worsening shortness of breath on exertion. She was noted to be in new onset atrial fibrillation with RVR. IV Cardizem was given, full dose anticoagulation was initiated and cardiology evaluation was requested. Cardiology advised increasing Coreg and continuing Lovenox. Lisinopril was added. Cardiology advised MIRTHA and cardioversion. Noted to have mild RASHAAD, lisinopril was held. Initially planned for DCCV, later advised rate control only. PT and palliative care consulted. Palliative care was consulted, patient made DNR/DNI. Given anemia and black stools, FOBT was ordered, which was positive. PT advised RAHUL. Kayexalate was ordered as she was noted to have hyperkalemia. to person, place, and time.      Cranial Nerves: No cranial nerve deficit.   Psychiatric:         Judgment: Judgment normal.           Results for orders placed or performed in visit on 05/25/21   POC Glycosylated Hemoglobin (Hb A1C)    Specimen: Blood   Result Value Ref Range    Hemoglobin A1C 7.5 %           ECG 12 Lead    Date/Time: 5/25/2021 2:41 PM  Performed by: Noreen Smith MD  Authorized by: Noreen Smith MD   Comparison: compared with previous ECG from 7/10/2018  Similar to previous ECG  Rhythm: sinus rhythm  Rate: normal  Conduction: conduction normal  ST Segments: ST segments normal  T Waves: T waves normal  QRS axis: normal  Other: no other findings    Clinical impression: normal ECG            Assessment/Plan   Diagnoses and all orders for this visit:    Uncontrolled type 2 diabetes mellitus with hyperglycemia (CMS/HCC)  -     POC Glycosylated Hemoglobin (Hb A1C)  -     metFORMIN ER (GLUCOPHAGE-XR) 750 MG 24 hr tablet; Take 1 tablet by mouth Daily.  -     glimepiride (AMARYL) 2 MG tablet; Take 1 tablet by mouth Daily With Breakfast.    Uncontrolled type 2 diabetes mellitus with hyperglycemia (CMS/HCC)  Comments:  higher, counseled to increase exercise. add Warneruvia. has fam h/o bladder cancer.  Orders:  -     POC Glycosylated Hemoglobin (Hb A1C)  -     metFORMIN ER (GLUCOPHAGE-XR) 750 MG 24 hr tablet; Take 1 tablet by mouth Daily.  -     glimepiride (AMARYL) 2 MG tablet; Take 1 tablet by mouth Daily With Breakfast.    Uncomplicated asthma  -     mometasone-formoterol (Dulera) 100-5 MCG/ACT inhaler; Inhale 2 puffs Daily.    Non-seasonal allergic rhinitis due to other allergic trigger  -     montelukast (SINGULAIR) 10 MG tablet; Take 1 tablet by mouth Daily.    Other hyperlipidemia  -     pravastatin (PRAVACHOL) 10 MG tablet; Take 1 tablet by mouth Daily.    Bradycardia  -     Holter monitor - 24 hour; Future    Dyslipidemia  -     Comprehensive Metabolic Panel; Future  -     Lipid Panel;  Future                 Return for Next scheduled follow up.    Electronically signed by:    Noreen Smith MD    86 yr old female with diabetes mellitus, diabetic foot ulcer with recent diagnosis of osteomyelitis on IV antibiotics admitted with complaints of fatigue and worsening shortness of breath on exertion. She was noted to be in new onset atrial fibrillation with RVR. IV Cardizem was given, full dose anticoagulation was initiated and cardiology evaluation was requested. Cardiology advised increasing Coreg and continuing Lovenox. Lisinopril was added. Cardiology advised MIRTHA and cardioversion. Noted to have mild RASHAAD, lisinopril was held. Initially planned for DCCV, later advised rate control only. PT and palliative care consulted. Palliative care was consulted, patient made DNR/DNI. Given anemia and black stools, FOBT was ordered, which was positive. PT advised RAHUL. Kayexalate was ordered as she was noted to have hyperkalemia. GI evaluation was requested and advised EGD for further evaluation.

## 2022-02-21 NOTE — ED ADULT NURSE NOTE - NSFALLRSKINDICATORS_ED_ALL_ED
Admission process complete.  Patient ready for procedure.  Plan of care reviewed with patient.  VSS.  NPO status maintained.  Call light in reach.  Bed locked and in lowest position.       no

## 2022-07-29 NOTE — DIETITIAN INITIAL EVALUATION ADULT. - OTHER INFO
diabetes mellitus, diabetic foot ulcer with recent diagnosis of osteomyelitis on IV antibiotics admitted with complaints of fatigue and worsening shortness of breath on exertion. She was noted to be in new onset atrial fibrillation with RVR.  Aware pt with some confusion, but appeared to answer questions appropriately during interview.  Pt reports decreased po intake at meals- states she normally does not consume much for breakfast, but better at lunch.  Pt states decreased po intake prior to admission, but unsure about wt history.  Discussed importance of consuming adequate protein intake at meals to optimize nutrition status- unsure level of understanding at this time. yes...

## 2022-10-11 NOTE — ED PROVIDER NOTE - NO SIGNIFICANT PAST SURGICAL HISTORY
10/11/22 1617   Post-Acute Status   Post-Acute Authorization Placement   Post-Acute Placement Status Pending medical clearance/testing   Discharge Delays None known at this time       Per MD: -traction w/ preop planning   Patient not medically ready for discharge.       Mimi Miller, RN, CCRN-K, [facility]  Neuro-Critical Care   [phone]     <<----- Click to add NO significant Past Surgical History

## 2022-11-15 NOTE — H&P ADULT - CONTRAINDICATIONS ACEI/ARB
Symptoms of Retinal tear and detachment reviewed. Patient understands to call immediately with any such symptoms. No

## 2023-07-14 NOTE — ED PROVIDER NOTE - EYES NEGATIVE STATEMENT, MLM
no discharge, no irritation, no pain, no redness, and no visual changes. Universal Safety Interventions

## 2024-08-30 NOTE — ED PROVIDER NOTE - PMH
No pertinent past medical history    Pacemaker    Type 2 diabetes mellitus with foot ulcer, unspecified whether long term insulin use 3 (mild pain)

## 2025-01-13 NOTE — ED ADULT NURSE NOTE - PRO INTERPRETER NEED 2
left detailed message on home phone informing pt of results and to call office if any questions     [place]